# Patient Record
Sex: MALE | Race: WHITE | Employment: FULL TIME | URBAN - METROPOLITAN AREA
[De-identification: names, ages, dates, MRNs, and addresses within clinical notes are randomized per-mention and may not be internally consistent; named-entity substitution may affect disease eponyms.]

---

## 2018-09-15 ENCOUNTER — APPOINTMENT (OUTPATIENT)
Dept: CT IMAGING | Age: 38
End: 2018-09-15
Payer: MEDICAID

## 2018-09-15 ENCOUNTER — HOSPITAL ENCOUNTER (EMERGENCY)
Age: 38
Discharge: ANOTHER HOSPITAL | End: 2018-09-16
Attending: EMERGENCY MEDICINE
Payer: MEDICAID

## 2018-09-15 VITALS
OXYGEN SATURATION: 97 % | WEIGHT: 190 LBS | TEMPERATURE: 98.3 F | DIASTOLIC BLOOD PRESSURE: 90 MMHG | BODY MASS INDEX: 25.73 KG/M2 | HEART RATE: 80 BPM | RESPIRATION RATE: 16 BRPM | SYSTOLIC BLOOD PRESSURE: 134 MMHG | HEIGHT: 72 IN

## 2018-09-15 DIAGNOSIS — I63.9 CEREBELLAR INFARCT (HCC): Primary | ICD-10-CM

## 2018-09-15 LAB
ABSOLUTE EOS #: 0 K/UL (ref 0–0.4)
ABSOLUTE IMMATURE GRANULOCYTE: ABNORMAL K/UL (ref 0–0.3)
ABSOLUTE LYMPH #: 2.8 K/UL (ref 1–4.8)
ABSOLUTE MONO #: 0.9 K/UL (ref 0.2–0.8)
ANION GAP SERPL CALCULATED.3IONS-SCNC: 14 MMOL/L (ref 9–17)
BASOPHILS # BLD: 1 % (ref 0–2)
BASOPHILS ABSOLUTE: 0.1 K/UL (ref 0–0.2)
BUN BLDV-MCNC: 14 MG/DL (ref 6–20)
BUN/CREAT BLD: 15 (ref 9–20)
CALCIUM SERPL-MCNC: 10.1 MG/DL (ref 8.6–10.4)
CHLORIDE BLD-SCNC: 98 MMOL/L (ref 98–107)
CO2: 28 MMOL/L (ref 20–31)
CREAT SERPL-MCNC: 0.91 MG/DL (ref 0.7–1.2)
DIFFERENTIAL TYPE: ABNORMAL
EKG ATRIAL RATE: 59 BPM
EKG P AXIS: 31 DEGREES
EKG P-R INTERVAL: 118 MS
EKG Q-T INTERVAL: 458 MS
EKG QRS DURATION: 98 MS
EKG QTC CALCULATION (BAZETT): 453 MS
EKG R AXIS: 40 DEGREES
EKG T AXIS: 47 DEGREES
EKG VENTRICULAR RATE: 59 BPM
EOSINOPHILS RELATIVE PERCENT: 0 % (ref 1–4)
GFR AFRICAN AMERICAN: >60 ML/MIN
GFR NON-AFRICAN AMERICAN: >60 ML/MIN
GFR SERPL CREATININE-BSD FRML MDRD: ABNORMAL ML/MIN/{1.73_M2}
GFR SERPL CREATININE-BSD FRML MDRD: ABNORMAL ML/MIN/{1.73_M2}
GLUCOSE BLD-MCNC: 104 MG/DL (ref 70–99)
HCT VFR BLD CALC: 53 % (ref 41–53)
HEMOGLOBIN: 18 G/DL (ref 13.5–17.5)
IMMATURE GRANULOCYTES: ABNORMAL %
INR BLD: 1
LYMPHOCYTES # BLD: 23 % (ref 24–44)
MCH RBC QN AUTO: 30.8 PG (ref 26–34)
MCHC RBC AUTO-ENTMCNC: 34 G/DL (ref 31–37)
MCV RBC AUTO: 90.7 FL (ref 80–100)
MONOCYTES # BLD: 7 % (ref 1–7)
NRBC AUTOMATED: ABNORMAL PER 100 WBC
PARTIAL THROMBOPLASTIN TIME: 29.2 SEC (ref 23–31)
PDW BLD-RTO: 13.2 % (ref 11.5–14.5)
PLATELET # BLD: 250 K/UL (ref 130–400)
PLATELET ESTIMATE: ABNORMAL
PMV BLD AUTO: 9.2 FL (ref 6–12)
POTASSIUM SERPL-SCNC: 3.7 MMOL/L (ref 3.7–5.3)
PROTHROMBIN TIME: 10.4 SEC (ref 9.7–11.6)
RBC # BLD: 5.85 M/UL (ref 4.5–5.9)
RBC # BLD: ABNORMAL 10*6/UL
SEG NEUTROPHILS: 69 % (ref 36–66)
SEGMENTED NEUTROPHILS ABSOLUTE COUNT: 8.4 K/UL (ref 1.8–7.7)
SODIUM BLD-SCNC: 140 MMOL/L (ref 135–144)
TROPONIN INTERP: NORMAL
TROPONIN T: <0.03 NG/ML
WBC # BLD: 12.2 K/UL (ref 3.5–11)
WBC # BLD: ABNORMAL 10*3/UL

## 2018-09-15 PROCEDURE — 85730 THROMBOPLASTIN TIME PARTIAL: CPT

## 2018-09-15 PROCEDURE — 80048 BASIC METABOLIC PNL TOTAL CA: CPT

## 2018-09-15 PROCEDURE — 6360000002 HC RX W HCPCS: Performed by: EMERGENCY MEDICINE

## 2018-09-15 PROCEDURE — 2580000003 HC RX 258: Performed by: EMERGENCY MEDICINE

## 2018-09-15 PROCEDURE — 93005 ELECTROCARDIOGRAM TRACING: CPT

## 2018-09-15 PROCEDURE — 85610 PROTHROMBIN TIME: CPT

## 2018-09-15 PROCEDURE — 96374 THER/PROPH/DIAG INJ IV PUSH: CPT

## 2018-09-15 PROCEDURE — 84484 ASSAY OF TROPONIN QUANT: CPT

## 2018-09-15 PROCEDURE — 85025 COMPLETE CBC W/AUTO DIFF WBC: CPT

## 2018-09-15 PROCEDURE — 99285 EMERGENCY DEPT VISIT HI MDM: CPT

## 2018-09-15 PROCEDURE — 70450 CT HEAD/BRAIN W/O DYE: CPT

## 2018-09-15 RX ORDER — 0.9 % SODIUM CHLORIDE 0.9 %
1000 INTRAVENOUS SOLUTION INTRAVENOUS ONCE
Status: COMPLETED | OUTPATIENT
Start: 2018-09-15 | End: 2018-09-15

## 2018-09-15 RX ORDER — PROMETHAZINE HYDROCHLORIDE 25 MG/ML
12.5 INJECTION, SOLUTION INTRAMUSCULAR; INTRAVENOUS ONCE
Status: COMPLETED | OUTPATIENT
Start: 2018-09-15 | End: 2018-09-15

## 2018-09-15 RX ADMIN — SODIUM CHLORIDE 1000 ML: 9 INJECTION, SOLUTION INTRAVENOUS at 22:33

## 2018-09-15 RX ADMIN — PROMETHAZINE HYDROCHLORIDE 12.5 MG: 25 INJECTION INTRAMUSCULAR; INTRAVENOUS at 22:32

## 2018-09-15 ASSESSMENT — PAIN DESCRIPTION - ONSET: ONSET: ON-GOING

## 2018-09-15 ASSESSMENT — PAIN DESCRIPTION - ORIENTATION: ORIENTATION: LEFT

## 2018-09-15 ASSESSMENT — PAIN DESCRIPTION - PAIN TYPE: TYPE: ACUTE PAIN

## 2018-09-15 ASSESSMENT — PAIN DESCRIPTION - PROGRESSION: CLINICAL_PROGRESSION: NOT CHANGED

## 2018-09-15 ASSESSMENT — PAIN DESCRIPTION - FREQUENCY: FREQUENCY: CONTINUOUS

## 2018-09-15 ASSESSMENT — PAIN DESCRIPTION - DESCRIPTORS: DESCRIPTORS: HEADACHE

## 2018-09-15 ASSESSMENT — PAIN SCALES - GENERAL: PAINLEVEL_OUTOF10: 10

## 2018-09-15 ASSESSMENT — PAIN DESCRIPTION - LOCATION: LOCATION: HEAD

## 2018-09-16 ENCOUNTER — APPOINTMENT (OUTPATIENT)
Dept: GENERAL RADIOLOGY | Age: 38
DRG: 045 | End: 2018-09-16
Payer: MEDICAID

## 2018-09-16 ENCOUNTER — HOSPITAL ENCOUNTER (INPATIENT)
Age: 38
LOS: 2 days | Discharge: HOME | DRG: 045 | End: 2018-09-18
Attending: EMERGENCY MEDICINE | Admitting: PSYCHIATRY & NEUROLOGY
Payer: MEDICAID

## 2018-09-16 ENCOUNTER — APPOINTMENT (OUTPATIENT)
Dept: MRI IMAGING | Age: 38
DRG: 045 | End: 2018-09-16
Payer: MEDICAID

## 2018-09-16 ENCOUNTER — APPOINTMENT (OUTPATIENT)
Dept: CT IMAGING | Age: 38
DRG: 045 | End: 2018-09-16
Payer: MEDICAID

## 2018-09-16 DIAGNOSIS — I63.9 CEREBELLAR INFARCT (HCC): Primary | ICD-10-CM

## 2018-09-16 LAB
ABSOLUTE EOS #: 0.03 K/UL (ref 0–0.44)
ABSOLUTE IMMATURE GRANULOCYTE: 0.03 K/UL (ref 0–0.3)
ABSOLUTE LYMPH #: 2.52 K/UL (ref 1.1–3.7)
ABSOLUTE MONO #: 0.85 K/UL (ref 0.1–1.2)
ALBUMIN SERPL-MCNC: 3.9 G/DL (ref 3.5–5.2)
ALBUMIN/GLOBULIN RATIO: 1.3 (ref 1–2.5)
ALP BLD-CCNC: 58 U/L (ref 40–129)
ALT SERPL-CCNC: 99 U/L (ref 5–41)
ANION GAP SERPL CALCULATED.3IONS-SCNC: 12 MMOL/L (ref 9–17)
AST SERPL-CCNC: 67 U/L
BASOPHILS # BLD: 0 % (ref 0–2)
BASOPHILS ABSOLUTE: 0.04 K/UL (ref 0–0.2)
BILIRUB SERPL-MCNC: 1.24 MG/DL (ref 0.3–1.2)
BUN BLDV-MCNC: 12 MG/DL (ref 6–20)
BUN/CREAT BLD: ABNORMAL (ref 9–20)
CALCIUM SERPL-MCNC: 8.9 MG/DL (ref 8.6–10.4)
CHLORIDE BLD-SCNC: 102 MMOL/L (ref 98–107)
CHOLESTEROL/HDL RATIO: 3.7
CHOLESTEROL: 146 MG/DL
CO2: 26 MMOL/L (ref 20–31)
CREAT SERPL-MCNC: 0.71 MG/DL (ref 0.7–1.2)
DIFFERENTIAL TYPE: ABNORMAL
EOSINOPHILS RELATIVE PERCENT: 0 % (ref 1–4)
ESTIMATED AVERAGE GLUCOSE: 91 MG/DL
GFR AFRICAN AMERICAN: >60 ML/MIN
GFR NON-AFRICAN AMERICAN: >60 ML/MIN
GFR SERPL CREATININE-BSD FRML MDRD: ABNORMAL ML/MIN/{1.73_M2}
GFR SERPL CREATININE-BSD FRML MDRD: ABNORMAL ML/MIN/{1.73_M2}
GLUCOSE BLD-MCNC: 109 MG/DL (ref 70–99)
HBA1C MFR BLD: 4.8 % (ref 4–6)
HCT VFR BLD CALC: 48.2 % (ref 40.7–50.3)
HDLC SERPL-MCNC: 39 MG/DL
HEMOGLOBIN: 15.8 G/DL (ref 13–17)
HEPATITIS B SURFACE ANTIGEN: NONREACTIVE
HEPATITIS C ANTIBODY: REACTIVE
HOMOCYSTEINE: 10.7 UMOL/L
IMMATURE GRANULOCYTES: 0 %
LDL CHOLESTEROL: 90 MG/DL (ref 0–130)
LYMPHOCYTES # BLD: 27 % (ref 24–43)
MCH RBC QN AUTO: 30.3 PG (ref 25.2–33.5)
MCHC RBC AUTO-ENTMCNC: 32.8 G/DL (ref 28.4–34.8)
MCV RBC AUTO: 92.3 FL (ref 82.6–102.9)
MONOCYTES # BLD: 9 % (ref 3–12)
NRBC AUTOMATED: 0 PER 100 WBC
PDW BLD-RTO: 12.3 % (ref 11.8–14.4)
PLATELET # BLD: 210 K/UL (ref 138–453)
PLATELET ESTIMATE: ABNORMAL
PMV BLD AUTO: 11.3 FL (ref 8.1–13.5)
POTASSIUM SERPL-SCNC: 3.7 MMOL/L (ref 3.7–5.3)
RBC # BLD: 5.22 M/UL (ref 4.21–5.77)
RBC # BLD: ABNORMAL 10*6/UL
SEDIMENTATION RATE, ERYTHROCYTE: 4 MM (ref 0–10)
SEG NEUTROPHILS: 64 % (ref 36–65)
SEGMENTED NEUTROPHILS ABSOLUTE COUNT: 5.95 K/UL (ref 1.5–8.1)
SODIUM BLD-SCNC: 140 MMOL/L (ref 135–144)
TOTAL PROTEIN: 6.9 G/DL (ref 6.4–8.3)
TRIGL SERPL-MCNC: 85 MG/DL
TSH SERPL DL<=0.05 MIU/L-ACNC: 0.39 MIU/L (ref 0.3–5)
VLDLC SERPL CALC-MCNC: ABNORMAL MG/DL (ref 1–30)
WBC # BLD: 9.4 K/UL (ref 3.5–11.3)
WBC # BLD: ABNORMAL 10*3/UL

## 2018-09-16 PROCEDURE — 70498 CT ANGIOGRAPHY NECK: CPT

## 2018-09-16 PROCEDURE — 6370000000 HC RX 637 (ALT 250 FOR IP): Performed by: INTERNAL MEDICINE

## 2018-09-16 PROCEDURE — 70496 CT ANGIOGRAPHY HEAD: CPT

## 2018-09-16 PROCEDURE — 6370000000 HC RX 637 (ALT 250 FOR IP): Performed by: STUDENT IN AN ORGANIZED HEALTH CARE EDUCATION/TRAINING PROGRAM

## 2018-09-16 PROCEDURE — 6360000002 HC RX W HCPCS: Performed by: EMERGENCY MEDICINE

## 2018-09-16 PROCEDURE — 2580000003 HC RX 258: Performed by: INTERNAL MEDICINE

## 2018-09-16 PROCEDURE — 80053 COMPREHEN METABOLIC PANEL: CPT

## 2018-09-16 PROCEDURE — 94762 N-INVAS EAR/PLS OXIMTRY CONT: CPT

## 2018-09-16 PROCEDURE — 84443 ASSAY THYROID STIM HORMONE: CPT

## 2018-09-16 PROCEDURE — 99254 IP/OBS CNSLTJ NEW/EST MOD 60: CPT | Performed by: PSYCHIATRY & NEUROLOGY

## 2018-09-16 PROCEDURE — 80061 LIPID PANEL: CPT

## 2018-09-16 PROCEDURE — 87340 HEPATITIS B SURFACE AG IA: CPT

## 2018-09-16 PROCEDURE — 86803 HEPATITIS C AB TEST: CPT

## 2018-09-16 PROCEDURE — 99222 1ST HOSP IP/OBS MODERATE 55: CPT | Performed by: PSYCHIATRY & NEUROLOGY

## 2018-09-16 PROCEDURE — 85025 COMPLETE CBC W/AUTO DIFF WBC: CPT

## 2018-09-16 PROCEDURE — 99285 EMERGENCY DEPT VISIT HI MDM: CPT

## 2018-09-16 PROCEDURE — 83036 HEMOGLOBIN GLYCOSYLATED A1C: CPT

## 2018-09-16 PROCEDURE — 6360000002 HC RX W HCPCS: Performed by: INTERNAL MEDICINE

## 2018-09-16 PROCEDURE — 6370000000 HC RX 637 (ALT 250 FOR IP): Performed by: EMERGENCY MEDICINE

## 2018-09-16 PROCEDURE — 86147 CARDIOLIPIN ANTIBODY EA IG: CPT

## 2018-09-16 PROCEDURE — 6360000004 HC RX CONTRAST MEDICATION: Performed by: EMERGENCY MEDICINE

## 2018-09-16 PROCEDURE — 85651 RBC SED RATE NONAUTOMATED: CPT

## 2018-09-16 PROCEDURE — 6360000002 HC RX W HCPCS: Performed by: STUDENT IN AN ORGANIZED HEALTH CARE EDUCATION/TRAINING PROGRAM

## 2018-09-16 PROCEDURE — 2060000000 HC ICU INTERMEDIATE R&B

## 2018-09-16 PROCEDURE — 70551 MRI BRAIN STEM W/O DYE: CPT

## 2018-09-16 PROCEDURE — 36415 COLL VENOUS BLD VENIPUNCTURE: CPT

## 2018-09-16 PROCEDURE — 96374 THER/PROPH/DIAG INJ IV PUSH: CPT

## 2018-09-16 PROCEDURE — 83090 ASSAY OF HOMOCYSTEINE: CPT

## 2018-09-16 PROCEDURE — 71045 X-RAY EXAM CHEST 1 VIEW: CPT

## 2018-09-16 RX ORDER — ATORVASTATIN CALCIUM 80 MG/1
40 TABLET, FILM COATED ORAL ONCE
Status: COMPLETED | OUTPATIENT
Start: 2018-09-16 | End: 2018-09-16

## 2018-09-16 RX ORDER — FAMOTIDINE 20 MG/1
20 TABLET, FILM COATED ORAL 2 TIMES DAILY
Status: DISCONTINUED | OUTPATIENT
Start: 2018-09-16 | End: 2018-09-18 | Stop reason: HOSPADM

## 2018-09-16 RX ORDER — CHLORHEXIDINE GLUCONATE 0.12 MG/ML
15 RINSE ORAL 2 TIMES DAILY
Status: DISCONTINUED | OUTPATIENT
Start: 2018-09-16 | End: 2018-09-18 | Stop reason: HOSPADM

## 2018-09-16 RX ORDER — SODIUM CHLORIDE 0.9 % (FLUSH) 0.9 %
10 SYRINGE (ML) INJECTION EVERY 12 HOURS SCHEDULED
Status: DISCONTINUED | OUTPATIENT
Start: 2018-09-16 | End: 2018-09-18 | Stop reason: HOSPADM

## 2018-09-16 RX ORDER — ATORVASTATIN CALCIUM 40 MG/1
40 TABLET, FILM COATED ORAL NIGHTLY
Status: DISCONTINUED | OUTPATIENT
Start: 2018-09-16 | End: 2018-09-18 | Stop reason: HOSPADM

## 2018-09-16 RX ORDER — ASPIRIN 81 MG/1
81 TABLET, CHEWABLE ORAL DAILY
Status: DISCONTINUED | OUTPATIENT
Start: 2018-09-16 | End: 2018-09-16

## 2018-09-16 RX ORDER — KETOROLAC TROMETHAMINE 30 MG/ML
30 INJECTION, SOLUTION INTRAMUSCULAR; INTRAVENOUS EVERY 6 HOURS PRN
Status: DISCONTINUED | OUTPATIENT
Start: 2018-09-16 | End: 2018-09-18 | Stop reason: HOSPADM

## 2018-09-16 RX ORDER — KETOROLAC TROMETHAMINE 30 MG/ML
30 INJECTION, SOLUTION INTRAMUSCULAR; INTRAVENOUS ONCE
Status: COMPLETED | OUTPATIENT
Start: 2018-09-16 | End: 2018-09-16

## 2018-09-16 RX ORDER — SODIUM CHLORIDE 9 MG/ML
INJECTION, SOLUTION INTRAVENOUS CONTINUOUS
Status: ACTIVE | OUTPATIENT
Start: 2018-09-16 | End: 2018-09-16

## 2018-09-16 RX ORDER — ONDANSETRON 2 MG/ML
4 INJECTION INTRAMUSCULAR; INTRAVENOUS EVERY 6 HOURS PRN
Status: DISCONTINUED | OUTPATIENT
Start: 2018-09-16 | End: 2018-09-18 | Stop reason: HOSPADM

## 2018-09-16 RX ORDER — METOCLOPRAMIDE HYDROCHLORIDE 5 MG/ML
10 INJECTION INTRAMUSCULAR; INTRAVENOUS ONCE
Status: COMPLETED | OUTPATIENT
Start: 2018-09-16 | End: 2018-09-16

## 2018-09-16 RX ORDER — MECLIZINE HCL 12.5 MG/1
25 TABLET ORAL ONCE
Status: COMPLETED | OUTPATIENT
Start: 2018-09-16 | End: 2018-09-16

## 2018-09-16 RX ORDER — 0.9 % SODIUM CHLORIDE 0.9 %
500 INTRAVENOUS SOLUTION INTRAVENOUS ONCE
Status: COMPLETED | OUTPATIENT
Start: 2018-09-16 | End: 2018-09-16

## 2018-09-16 RX ORDER — ASPIRIN 81 MG/1
81 TABLET, CHEWABLE ORAL ONCE
Status: COMPLETED | OUTPATIENT
Start: 2018-09-16 | End: 2018-09-16

## 2018-09-16 RX ORDER — MAGNESIUM HYDROXIDE/ALUMINUM HYDROXICE/SIMETHICONE 120; 1200; 1200 MG/30ML; MG/30ML; MG/30ML
30 SUSPENSION ORAL ONCE
Status: COMPLETED | OUTPATIENT
Start: 2018-09-16 | End: 2018-09-16

## 2018-09-16 RX ORDER — ACETAMINOPHEN 325 MG/1
650 TABLET ORAL EVERY 4 HOURS PRN
Status: DISCONTINUED | OUTPATIENT
Start: 2018-09-16 | End: 2018-09-18 | Stop reason: HOSPADM

## 2018-09-16 RX ORDER — CHLORHEXIDINE GLUCONATE 0.12 MG/ML
15 RINSE ORAL 2 TIMES DAILY
Status: CANCELLED | OUTPATIENT
Start: 2018-09-16

## 2018-09-16 RX ORDER — FOLIC ACID 1 MG/1
1 TABLET ORAL DAILY
Status: DISCONTINUED | OUTPATIENT
Start: 2018-09-16 | End: 2018-09-18 | Stop reason: HOSPADM

## 2018-09-16 RX ORDER — SODIUM CHLORIDE 0.9 % (FLUSH) 0.9 %
10 SYRINGE (ML) INJECTION PRN
Status: DISCONTINUED | OUTPATIENT
Start: 2018-09-16 | End: 2018-09-18 | Stop reason: HOSPADM

## 2018-09-16 RX ORDER — ASPIRIN 81 MG/1
81 TABLET ORAL DAILY
Status: DISCONTINUED | OUTPATIENT
Start: 2018-09-16 | End: 2018-09-18 | Stop reason: HOSPADM

## 2018-09-16 RX ADMIN — ONDANSETRON 4 MG: 2 INJECTION INTRAMUSCULAR; INTRAVENOUS at 11:51

## 2018-09-16 RX ADMIN — FOLIC ACID 1 MG: 1 TABLET ORAL at 04:32

## 2018-09-16 RX ADMIN — IOPAMIDOL 90 ML: 755 INJECTION, SOLUTION INTRAVENOUS at 02:31

## 2018-09-16 RX ADMIN — KETOROLAC TROMETHAMINE 30 MG: 30 INJECTION, SOLUTION INTRAMUSCULAR at 12:31

## 2018-09-16 RX ADMIN — Medication 10 ML: at 08:29

## 2018-09-16 RX ADMIN — DESMOPRESSIN ACETATE 40 MG: 0.2 TABLET ORAL at 21:36

## 2018-09-16 RX ADMIN — ALUMINUM HYDROXIDE, MAGNESIUM HYDROXIDE, AND SIMETHICONE 30 ML: 200; 200; 20 SUSPENSION ORAL at 17:37

## 2018-09-16 RX ADMIN — SODIUM CHLORIDE 500 ML: 9 INJECTION, SOLUTION INTRAVENOUS at 01:58

## 2018-09-16 RX ADMIN — CHLORHEXIDINE GLUCONATE 0.12% ORAL RINSE 15 ML: 1.2 LIQUID ORAL at 21:36

## 2018-09-16 RX ADMIN — METOCLOPRAMIDE 10 MG: 5 INJECTION, SOLUTION INTRAMUSCULAR; INTRAVENOUS at 01:11

## 2018-09-16 RX ADMIN — KETOROLAC TROMETHAMINE 30 MG: 30 INJECTION, SOLUTION INTRAMUSCULAR at 18:21

## 2018-09-16 RX ADMIN — ASPIRIN 81 MG: 81 TABLET ORAL at 08:29

## 2018-09-16 RX ADMIN — ASPIRIN 81 MG 81 MG: 81 TABLET ORAL at 02:17

## 2018-09-16 RX ADMIN — ATORVASTATIN CALCIUM 40 MG: 80 TABLET, FILM COATED ORAL at 04:32

## 2018-09-16 RX ADMIN — KETOROLAC TROMETHAMINE 30 MG: 30 INJECTION, SOLUTION INTRAMUSCULAR at 04:32

## 2018-09-16 RX ADMIN — Medication 10 ML: at 21:37

## 2018-09-16 RX ADMIN — Medication 10 ML: at 04:33

## 2018-09-16 RX ADMIN — SODIUM CHLORIDE: 9 INJECTION, SOLUTION INTRAVENOUS at 04:37

## 2018-09-16 RX ADMIN — ACETAMINOPHEN 650 MG: 325 TABLET ORAL at 21:37

## 2018-09-16 RX ADMIN — MECLIZINE HYDROCHLORIDE 25 MG: 12.5 TABLET, FILM COATED ORAL at 01:58

## 2018-09-16 RX ADMIN — FAMOTIDINE 20 MG: 20 TABLET, FILM COATED ORAL at 08:29

## 2018-09-16 RX ADMIN — FAMOTIDINE 20 MG: 20 TABLET, FILM COATED ORAL at 21:36

## 2018-09-16 RX ADMIN — CHLORHEXIDINE GLUCONATE 0.12% ORAL RINSE 15 ML: 1.2 LIQUID ORAL at 08:28

## 2018-09-16 ASSESSMENT — PAIN DESCRIPTION - LOCATION
LOCATION: HEAD

## 2018-09-16 ASSESSMENT — ENCOUNTER SYMPTOMS
VOMITING: 1
VOMITING: 0
NAUSEA: 1
SORE THROAT: 0
RHINORRHEA: 0
EYE PAIN: 0
BLOOD IN STOOL: 0
EYE DISCHARGE: 0
ABDOMINAL PAIN: 0
WHEEZING: 0
CONSTIPATION: 0
NAUSEA: 0
BACK PAIN: 0
COLOR CHANGE: 0
SHORTNESS OF BREATH: 0
ABDOMINAL PAIN: 0
RHINORRHEA: 0
DIARRHEA: 0
CHEST TIGHTNESS: 0
COUGH: 0
SHORTNESS OF BREATH: 0

## 2018-09-16 ASSESSMENT — PAIN SCALES - GENERAL
PAINLEVEL_OUTOF10: 4
PAINLEVEL_OUTOF10: 5
PAINLEVEL_OUTOF10: 7
PAINLEVEL_OUTOF10: 10
PAINLEVEL_OUTOF10: 7
PAINLEVEL_OUTOF10: 10

## 2018-09-16 ASSESSMENT — PAIN DESCRIPTION - PAIN TYPE: TYPE: ACUTE PAIN

## 2018-09-16 NOTE — FLOWSHEET NOTE
[address] [facility] [address]     Emergency/Trauma Note    PATIENT NAME: Santosh Clemons    Shift date: 9/16/2018  Shift day: Sunday   Shift # 3    Room # 34/34   Name: Santosh Clemons            Age: 34 y.o. Gender: male          Pentecostal: Non-Amish   Place of Adventist: None    Trauma/Incident type: Stroke Consult  Admit Date & Time: 9/16/2018 12:53 AM  TRAUMA NAME:         PATIENT/EVENT DESCRIPTION:  Santosh Clemons is a 34 y.o. male who arrived via EMS as a transfer from [facility]  as a stroke consult. Patient was awake and alert and was able to communicate. Patient stated that he was dizzy and it started today. Pt to be admitted to /34. SPIRITUAL ASSESSMENT/INTERVENTION:   responded to page. Patient's mom Kati Abdul ([phone]) was present. Patient appeared drowsy and mom stated that he was just given medication. Mom stated that patient was \"throwing up all day\".  nurtured hope, affirmed feelings and provided presence and support. PATIENT BELONGINGS:  No belongings noted    ANY BELONGINGS OF SIGNIFICANT VALUE NOTED:  None    REGISTRATION STAFF NOTIFIED? Yes      WHAT IS YOUR SPIRITUAL CARE PLAN FOR THIS PATIENT?:   Osmany Carranza will remain available for spiritual and emotional support as needed. Electronically signed by Tia Schaffer on 9/16/2018 at [address] [place] [address]  [phone]     09/16/18 7045   Encounter Summary   Services provided to: Patient and family together   Referral/Consult From: Multi-disciplinary team   Support System Parent   Place of Nondenominational None   Continue Visiting (9/16/2018)   Complexity of Encounter High   Length of Encounter 30 minutes   Spiritual Assessment Completed Yes   Crisis   Type (Stroke Consult)   Assessment Calm; Approachable;Coping   Intervention Active listening;Explored feelings, thoughts, concerns;Explored coping resources;Nurtured hope;Sustaining presence/ Ministry of

## 2018-09-16 NOTE — ED PROVIDER NOTES
dizziness and headaches. Negative for syncope, weakness and numbness. Hematological: Does not bruise/bleed easily. Psychiatric/Behavioral: Negative for behavioral problems, self-injury and suicidal ideas. Except as noted above the remainder of the review of systems was reviewed and negative. PAST MEDICAL HISTORY     Past Medical History:   Diagnosis Date    Anxiety     Blind right eye     GERD (gastroesophageal reflux disease)     Lung collapse     Pneumonia     Psychiatric problem          SURGICAL HISTORY       Past Surgical History:   Procedure Laterality Date    COLONOSCOPY      ENDOSCOPY, COLON, DIAGNOSTIC      EYE SURGERY      LOBECTOMY Left     lower lobe         CURRENT MEDICATIONS       Previous Medications    No medications on file       ALLERGIES     Patient has no known allergies. FAMILY HISTORY     History reviewed. No pertinent family history. SOCIAL HISTORY       Social History     Social History    Marital status: Single     Spouse name: N/A    Number of children: N/A    Years of education: N/A     Social History Main Topics    Smoking status: Current Every Day Smoker     Types: Cigarettes    Smokeless tobacco: None    Alcohol use Yes      Comment: socially    Drug use: Yes     Types: Cocaine    Sexual activity: Not Asked     Other Topics Concern    None     Social History Narrative    None       SCREENINGS             PHYSICAL EXAM    (up to 7 for level 4, 8 or more for level 5)     ED Triage Vitals [09/15/18 2145]   BP Temp Temp Source Pulse Resp SpO2 Height Weight   (!) 134/90 98.3 °F (36.8 °C) Oral 80 16 97 % 6' (1.829 m) 190 lb (86.2 kg)       Physical Exam   Constitutional: He is oriented to person, place, and time. He appears well-developed. No distress. HENT:   Head: Normocephalic and atraumatic. Eyes: EOM are normal. Right eye exhibits no discharge. Left eye exhibits no discharge. Neck: Normal range of motion. No tracheal deviation present. Cardiovascular: Normal rate and regular rhythm. Exam reveals no friction rub. No murmur heard. Pulmonary/Chest: Effort normal. No stridor. No respiratory distress. He has no wheezes. He has no rales. He exhibits no tenderness. Abdominal: Soft. He exhibits no distension and no mass. There is no tenderness. There is no rebound and no guarding. Musculoskeletal: Normal range of motion. He exhibits no edema, tenderness or deformity. Neurological: He is alert and oriented to person, place, and time. NIH stroke scale of 0   Skin: Skin is warm. No rash noted. He is not diaphoretic. No erythema. Psychiatric: He has a normal mood and affect. His behavior is normal.       DIAGNOSTIC RESULTS     EKG: All EKG's are interpreted by the Emergency Department Physician who either signs or Co-signs this chart in the absence of a cardiologist.        RADIOLOGY:   Non-plain film images such as CT, Ultrasound and MRI are read by the radiologist. Plain radiographic images are visualized and preliminarily interpreted by the emergency physician with the below findings:        Interpretation per the Radiologist below, if available at the time of this note:    CT Head WO Contrast   Preliminary Result   Left cerebellar hemisphere acute to subacute infarction, within the left   posterior inferior cerebellar artery territory. Consider further   characterization by MRI. Critical results were called by Dr. Rody Haney to Pedro Isaacs on   9/15/2018 at 23:32.                ED BEDSIDE ULTRASOUND:   Performed by ED Physician - none    LABS:  Labs Reviewed   CBC WITH AUTO DIFFERENTIAL - Abnormal; Notable for the following:        Result Value    WBC 12.2 (*)     Hemoglobin 18.0 (*)     Seg Neutrophils 69 (*)     Lymphocytes 23 (*)     Eosinophils % 0 (*)     Segs Absolute 8.40 (*)     Absolute Mono # 0.90 (*)     All other components within normal limits   BASIC METABOLIC PANEL - Abnormal; Notable for the following: Glucose 104 (*)     All other components within normal limits   APTT   PROTIME-INR   TROPONIN   TROPONIN   TROPONIN   TROPONIN       All other labs were within normal range or not returned as of this dictation. EMERGENCY DEPARTMENT COURSE and DIFFERENTIAL DIAGNOSIS/MDM:   Vitals:    Vitals:    09/15/18 2145   BP: (!) 134/90   Pulse: 80   Resp: 16   Temp: 98.3 °F (36.8 °C)   TempSrc: Oral   SpO2: 97%   Weight: 190 lb (86.2 kg)   Height: 6' (1.829 m)           MDM  Number of Diagnoses or Management Options  Diagnosis management comments: 78-year-old male presented with complaint of headache and dizziness. Patient denied any pertinent past medical history. Patient's last known well was proximal me 10 AM this morning. Patient's presentation was concerning for potential posterior stroke. Patient's NIH was 0. However, imaging did demonstrate cerebellar infarct. Interventional neurology at [facility] spoken to and agreed to accept the patient. In addition, your physician Dr. Stefania Keane spoken SABs and accepted patient for transfer. At the time of transfer patient was hemodynamically stable him a GCS of 15. Wrist and benefits were discussed with the patient and family of transfer and they were agreeable. CRITICAL CARE TIME   Total Critical Care time was  minutes, excluding separately reportable procedures. There was a high probability of clinically significant/life threatening deterioration in the patient's condition which required my urgent intervention. CONSULTS:  None    PROCEDURES:  Unless otherwise noted below, none     Procedures    FINAL IMPRESSION      1. Cerebellar infarct ([facility] [address]          DISPOSITION/PLAN   DISPOSITION        PATIENT REFERRED TO:  No follow-up provider specified.     DISCHARGE MEDICATIONS:  New Prescriptions    No medications on file          Problem List:  Patient Active Problem List   Diagnosis Code    Pneumothorax J93.9           Summation      Patient Course:    ED Medications administered this visit:    Medications   0.9 % sodium chloride bolus (0 mLs Intravenous Stopped 9/15/18 5123)   promethazine (PHENERGAN) injection 12.5 mg (12.5 mg Intravenous Given 9/15/18 2232)       New Prescriptions from this visit:    New Prescriptions    No medications on file       Follow-up:  No follow-up provider specified. Final Impression:   1.  Cerebellar infarct [facility])               (Please note that portions of this note were completed with a voice recognition program.  Efforts were made to edit the dictations but occasionally words are mis-transcribed.)    Radha Salazar MD (electronically signed)  Attending Emergency Physician           Radha Salazar MD  09/16/18 0005

## 2018-09-16 NOTE — PROGRESS NOTES
Patient admitted to [facility] ambulatory from wheelchair. Complaint of 10/10 headache, visual and auditory sensitivity, right sided extremity and facial numbness with tingling. No motor deficits. Bedside swallow screen passed, see charting. Neurology resident notified, patient medicated with Toradol. Educated on safety precautions.

## 2018-09-16 NOTE — PLAN OF CARE
Problem: ACTIVITY INTOLERANCE/IMPAIRED MOBILITY  Goal: Mobility/activity is maintained at optimum level for patient  Outcome: Ongoing  Patient has no motor deficits. Numbness tingling to left side, mild dizziness. Able to ambulate steadily with staff standby assistance. Educated on fall precautions. Problem: Pain:  Goal: Control of acute pain  Control of acute pain   Outcome: Ongoing  10/10 headache with photosensitivity and auditory sensitivity. Environment changed for comfort. Resident notiofioed of pain, given one time Toradol. Patient educated on available pain control measures including non-pharmacologic and medications. Pain goal discussed. Whiteboard updated for available time of next administration PRN. Will continue ordered interventions & assess pain.

## 2018-09-16 NOTE — CONSULTS
Patient has no known allergies. Social History:   Current smoker. He is a social drinker. He has used cocaine in the past per the EMR. Family History:   History reviewed. No pertinent family history. REVIEW OF SYSTEMS:  As discussed in the HPI. PHYSICAL EXAM:  VITALS:  /73   Pulse 57   Temp 98.1 °F (36.7 °C) (Oral)   Resp 16   Ht 6' (1.829 m)   Wt 191 lb 8 oz (86.9 kg)   SpO2 95%   BMI 25.97 kg/m²     Easily roused from sleep, NAD  Answers questions appropriately with clear and fluent speech  CNs II-XII intact to bedside exam  No pronator drift  MAEW with 5/5 strength in all major muscle groups  Sensation intact to light touch throughout, but subjectively altered on the left in a non-dermatomal distribution involving the arm and leg. Mild ataxia on the left with finger-to-nose, finger-nose-finger  Gait testing deferred      DATA:  I personally reviewed a CT of the head, MRI of the brain, as well as the radiologists' reports. There is an acute infarct in the left cerebellar hemisphere with some effacement of the fourth ventricle, but no evidence of hydrocephalus or brainstem compression. IMPRESSION/RECOMMENDATIONS:      34year old man with left cerebellar stroke. -Currently no signs of hydrocephalus or brainstem compression. Will need close neurologic monitoring with at least q2hr neuro checks. I do not see any need for surgical intervention at this time.

## 2018-09-16 NOTE — ED PROVIDER NOTES
 Alcohol use Yes      Comment: socially    Drug use: Yes     Types: Cocaine    Sexual activity: Not on file     Other Topics Concern    Not on file     Social History Narrative    No narrative on file       History reviewed. No pertinent family history. Allergies:  Patient has no known allergies. Home Medications:  Prior to Admission medications    Not on File       REVIEW OF SYSTEMS    (2-9 systems for level 4, 10 or more for level 5)      Review of Systems   Constitutional: Negative for chills and fever. HENT: Negative for congestion and rhinorrhea. Eyes: Negative for visual disturbance. Respiratory: Negative for shortness of breath. Cardiovascular: Negative for chest pain. Gastrointestinal: Positive for nausea and vomiting. Negative for abdominal pain. Genitourinary: Negative for dysuria and frequency. Musculoskeletal: Negative for arthralgias. Skin: Negative for wound. Neurological: Positive for dizziness. Negative for light-headedness. PHYSICAL EXAM   (up to 7 for level 4, 8 or more for level 5)      INITIAL VITALS:   /67   Pulse 57   Temp 98.4 °F (36.9 °C)   Resp 14   Ht 6' (1.829 m)   Wt 191 lb 8 oz (86.9 kg)   SpO2 98%   BMI 25.97 kg/m²     Physical Exam   Constitutional: He is oriented to person, place, and time. No distress. HENT:   Head: Normocephalic and atraumatic. Eyes: Right eye exhibits no discharge. Left eye exhibits no discharge. Cardiovascular: Normal rate, regular rhythm and normal heart sounds. Exam reveals no friction rub. No murmur heard. Pulmonary/Chest: Effort normal and breath sounds normal. No stridor. No respiratory distress. He has no wheezes. He has no rales. He exhibits no tenderness. Abdominal: Soft. He exhibits no distension. There is no tenderness. There is no guarding. Neurological: He is alert and oriented to person, place, and time. He has normal strength. No cranial nerve deficit or sensory deficit.  GCS eye mg    atorvastatin (LIPITOR) tablet 40 mg    iopamidol (ISOVUE-370) 76 % injection 90 mL    folic acid (FOLVITE) tablet 1 mg    ketorolac (TORADOL) injection 30 mg    acetaminophen (TYLENOL) tablet 650 mg    0.9 % sodium chloride infusion    influenza quadrivalent-split vaccine (FLUZONE) injection 0.5 mL       DIAGNOSTIC RESULTS / EMERGENCY DEPARTMENT COURSE / MDM     LABS:  No results found for this visit on 09/16/18. EMERGENCY DEPARTMENT COURSE:    MDM: Patient found to have cerebellar infarct seen and evaluated by neurology team.  They would like CTA head neck as well as MRI brain but this can be finished while patient is inpatient. Will receive a CTA and then go to the floor for further management by neurology. Also given aspirin and Plavix. Discussed with patient is agreeable with the admission. PROCEDURES:  None    CONSULTS:  IP CONSULT TO ENDOVASCULAR NEUROSURGERY  IP CONSULT TO NEUROSURGERY    FINAL IMPRESSION      1. Cerebellar infarct ([facility] [address]          DISPOSITION / PLAN     DISPOSITION Admitted 09/16/2018 02:11:06 AM      PATIENT REFERRED TO:  No follow-up provider specified. DISCHARGE MEDICATIONS:  There are no discharge medications for this patient.       Raji Rodarte MD  Emergency Medicine Resident  [facility]    (Please note that portions of this note were completed with a voice recognition program.  Efforts were made to edit the dictations but occasionally words are mis-transcribed.)       Raji Rodarte MD  Resident  09/16/18 5689

## 2018-09-16 NOTE — ED NOTES
MRI Checklist completed, signed, and faxed.      [facility] [place], RN  09/16/18 4983

## 2018-09-16 NOTE — PROGRESS NOTES
[address]  Speech Language Pathology    Date: 9/16/2018  Patient Name: Deborah Zapata  YOB: 1980   AGE: 34 y.o. MRN: [id number]        Patient Not Available for Speech Therapy     Due to:  [] Testing  [] Hemodialysis  [] Cancelled by RN  [] Surgery   [] Intubation/Sedation/Pain Medication  [] Medical instability  [x] Other: Pt refused due to just falling sleep. Requests evaluation to be completed tomorrow. Next scheduled treatment: 9/17  Completed by: Barbara Wade M.A. CCC-SLP

## 2018-09-16 NOTE — ED NOTES
Pt ambulatory to restroom with steady gait.       [address] Anjum Carrillo RN  09/16/18 0000
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
## 2018-09-16 NOTE — H&P
Range    PTT 29.2 23 - 31 sec   Protime-INR    Collection Time: 09/15/18 10:22 PM   Result Value Ref Range    Protime 10.4 9.7 - 11.6 sec    INR 1.0    CBC Auto Differential    Collection Time: 09/15/18 10:22 PM   Result Value Ref Range    WBC 12.2 (H) 3.5 - 11.0 k/uL    RBC 5.85 4.5 - 5.9 m/uL    Hemoglobin 18.0 (H) 13.5 - 17.5 g/dL    Hematocrit 53.0 41 - 53 %    MCV 90.7 80 - 100 fL    MCH 30.8 26 - 34 pg    MCHC 34.0 31 - 37 g/dL    RDW 13.2 11.5 - 14.5 %    Platelets 040 809 - 796 k/uL    MPV 9.2 6.0 - 12.0 fL    NRBC Automated NOT REPORTED per 100 WBC    Differential Type NOT REPORTED     Immature Granulocytes NOT REPORTED 0 %    Absolute Immature Granulocyte NOT REPORTED 0.00 - 0.30 k/uL    WBC Morphology NOT REPORTED     RBC Morphology NOT REPORTED     Platelet Estimate NOT REPORTED     Seg Neutrophils 69 (H) 36 - 66 %    Lymphocytes 23 (L) 24 - 44 %    Monocytes 7 1 - 7 %    Eosinophils % 0 (L) 1 - 4 %    Basophils 1 0 - 2 %    Segs Absolute 8.40 (H) 1.8 - 7.7 k/uL    Absolute Lymph # 2.80 1.0 - 4.8 k/uL    Absolute Mono # 0.90 (H) 0.2 - 0.8 k/uL    Absolute Eos # 0.00 0.0 - 0.4 k/uL    Basophils # 0.10 0.0 - 0.2 k/uL   Basic Metabolic Panel    Collection Time: 09/15/18 10:22 PM   Result Value Ref Range    Glucose 104 (H) 70 - 99 mg/dL    BUN 14 6 - 20 mg/dL    CREATININE 0.91 0.70 - 1.20 mg/dL    Bun/Cre Ratio 15 9 - 20    Calcium 10.1 8.6 - 10.4 mg/dL    Sodium 140 135 - 144 mmol/L    Potassium 3.7 3.7 - 5.3 mmol/L    Chloride 98 98 - 107 mmol/L    CO2 28 20 - 31 mmol/L    Anion Gap 14 9 - 17 mmol/L    GFR Non-African American >60 >60 mL/min    GFR African American >60 >60 mL/min    GFR Comment          GFR Staging NOT REPORTED    Troponin    Collection Time: 09/15/18 10:22 PM   Result Value Ref Range    Troponin T <0.03 <0.03 ng/mL    Troponin Interp         EKG 12 Lead    Collection Time: 09/15/18 10:26 PM   Result Value Ref Range    Ventricular Rate 59 BPM    Atrial Rate 59 BPM    P-R Interval 118 ms QRS Duration 98 ms    Q-T Interval 458 ms    QTc Calculation (Bazett) 453 ms    P Axis 31 degrees    R Axis 40 degrees    T Axis 47 degrees       Imaging/Diagnostics:    1.) Head CT Scan WO Contrast from [facility]  Impression   Left cerebellar hemisphere acute to subacute infarction, within the left   posterior inferior cerebellar artery territory.  Consider further   characterization by MRI.       Critical results were called by Dr. Antonio Rodriguez to Eriberto Gaston on   9/15/2018 at 23:32.         Assessment :      Primary Problem  34 y.o. male who presents with Headache, nausea, vomiting and dizziness since 10:00AM (9/15/18). [facility] to [facility] around 9:00PM (9/15/18). CT Scan revealed acute to subacute infarct left cerebellum. Patient transfer here. Patient out of window for tPA and Mechanical Trombectomy.  Will do CTA H/N to evaluate any dissection    Active Hospital Problems    Diagnosis Date Noted    Cerebellar stroke ([facility] [address] [I63.9] 09/16/2018       Plan:     Patient status Admit as inpatient in the  Progressive Unit/Step down    - CTA H/N- STAT              - MRI Brain WO               - 2D ECHO              - ASA 81mg now then daily               - Folic acid 1mg BID              - Lipitor 40mg nightly               - Fasting Lipid panel              - PT, OT, Speech eval               - Hydrate with 1L              - Telemetry               - Close Neuro checks per protocol  - We recommend -180  - Blood glucose goal less than 180  - Please avoid dextrose containing solutions  - Will admit to General neurology, if becoming too drowsy will send to NICU  - Neurosurgery consult in AM if necessary    Consultations:   IP CONSULT TO ENDOVASCULAR NEUROSURGERY    Patient is admitted as inpatient status because of co-morbidities listed above, severity of signs and symptoms as outlined, requirement for current medical therapies and most importantly because of direct risk to patient if care not provided in a

## 2018-09-16 NOTE — CARE COORDINATION
Case Management Initial Discharge Plan  Matthew Pope,             Met with:patient and mom to discuss discharge plans. Information verified: address, contacts, phone number, , insurance Yes  PCP: No primary care provider on file. Date of last visit: no pcp, has no insurance. Will need [facility] or [facility] for follow up care. Insurance Provider: no insurance    Discharge Planning    Living Arrangements:  Alone   Support Systems:  Family Members    Home has 2 stories  1 stairs to climb to get into front door, lives in basement of ranch house, 1 flight stairs to climb to reach second floor  Location of bedroom/bathroom in home bedroom in basement bathroom [place]    Patient able to perform ADL's:Independent    Current Services (outpatient & in home) none  DME equipment: none  DME provider: none    Pharmacy: [facility] Medications:  No  Does patient want to participate in local refill/ meds to beds program?  No    Potential Assistance Needed:  N/A    Patient agreeable to home care: No  [place] of choice provided:  n/a    Prior SNF/Rehab Placement and Facility: none  Agreeable to SNF/Rehab: No  [place] of choice provided: n/a   Evaluation: n/a    Expected Discharge date:  18  Patient expects to be discharged to:  home  Follow Up Appointment: Best Day/ Time: Wednesday PM    Transportation provider: mom  Transportation arrangements needed for discharge: No    Readmission Risk              Risk of Unplanned Readmission:        6               Does patient have a readmission risk score greater than 14?: No  If yes, follow-up appointment must be made within 7 days of discharge. Discharge Plan: home with mom.            Electronically signed by Echo Castro RN on 18 at 2:22 PM

## 2018-09-16 NOTE — ED NOTES
Bed: 34  Expected date:   Expected time:   Means of arrival:   Comments:  sandra Thurman RN  09/16/18 3163

## 2018-09-17 ENCOUNTER — APPOINTMENT (OUTPATIENT)
Dept: CT IMAGING | Age: 38
DRG: 045 | End: 2018-09-17
Payer: MEDICAID

## 2018-09-17 LAB
LV EF: 50 %
LVEF MODALITY: NORMAL

## 2018-09-17 PROCEDURE — 2580000003 HC RX 258: Performed by: INTERNAL MEDICINE

## 2018-09-17 PROCEDURE — 99233 SBSQ HOSP IP/OBS HIGH 50: CPT | Performed by: PSYCHIATRY & NEUROLOGY

## 2018-09-17 PROCEDURE — 6360000002 HC RX W HCPCS: Performed by: STUDENT IN AN ORGANIZED HEALTH CARE EDUCATION/TRAINING PROGRAM

## 2018-09-17 PROCEDURE — 6360000002 HC RX W HCPCS: Performed by: INTERNAL MEDICINE

## 2018-09-17 PROCEDURE — G9168 MEMORY CURRENT STATUS: HCPCS

## 2018-09-17 PROCEDURE — G8988 SELF CARE GOAL STATUS: HCPCS

## 2018-09-17 PROCEDURE — 70450 CT HEAD/BRAIN W/O DYE: CPT

## 2018-09-17 PROCEDURE — 6370000000 HC RX 637 (ALT 250 FOR IP): Performed by: INTERNAL MEDICINE

## 2018-09-17 PROCEDURE — G8978 MOBILITY CURRENT STATUS: HCPCS

## 2018-09-17 PROCEDURE — 93306 TTE W/DOPPLER COMPLETE: CPT

## 2018-09-17 PROCEDURE — 2060000000 HC ICU INTERMEDIATE R&B

## 2018-09-17 PROCEDURE — 99233 SBSQ HOSP IP/OBS HIGH 50: CPT | Performed by: NEUROLOGICAL SURGERY

## 2018-09-17 PROCEDURE — 6370000000 HC RX 637 (ALT 250 FOR IP): Performed by: STUDENT IN AN ORGANIZED HEALTH CARE EDUCATION/TRAINING PROGRAM

## 2018-09-17 PROCEDURE — 97165 OT EVAL LOW COMPLEX 30 MIN: CPT

## 2018-09-17 PROCEDURE — 92523 SPEECH SOUND LANG COMPREHEN: CPT

## 2018-09-17 PROCEDURE — G8989 SELF CARE D/C STATUS: HCPCS

## 2018-09-17 PROCEDURE — 94762 N-INVAS EAR/PLS OXIMTRY CONT: CPT

## 2018-09-17 PROCEDURE — G8987 SELF CARE CURRENT STATUS: HCPCS

## 2018-09-17 PROCEDURE — G8980 MOBILITY D/C STATUS: HCPCS

## 2018-09-17 PROCEDURE — 97161 PT EVAL LOW COMPLEX 20 MIN: CPT

## 2018-09-17 PROCEDURE — G8979 MOBILITY GOAL STATUS: HCPCS

## 2018-09-17 PROCEDURE — G9169 MEMORY GOAL STATUS: HCPCS

## 2018-09-17 RX ORDER — PANTOPRAZOLE SODIUM 40 MG/1
40 TABLET, DELAYED RELEASE ORAL ONCE
Status: COMPLETED | OUTPATIENT
Start: 2018-09-17 | End: 2018-09-17

## 2018-09-17 RX ADMIN — FOLIC ACID 1 MG: 1 TABLET ORAL at 07:36

## 2018-09-17 RX ADMIN — DESMOPRESSIN ACETATE 40 MG: 0.2 TABLET ORAL at 20:37

## 2018-09-17 RX ADMIN — MAGNESIUM HYDROXIDE 30 ML: 400 SUSPENSION ORAL at 20:38

## 2018-09-17 RX ADMIN — PANTOPRAZOLE SODIUM 40 MG: 40 TABLET, DELAYED RELEASE ORAL at 22:34

## 2018-09-17 RX ADMIN — ASPIRIN 81 MG: 81 TABLET ORAL at 07:36

## 2018-09-17 RX ADMIN — FAMOTIDINE 20 MG: 20 TABLET, FILM COATED ORAL at 20:37

## 2018-09-17 RX ADMIN — KETOROLAC TROMETHAMINE 30 MG: 30 INJECTION, SOLUTION INTRAMUSCULAR at 13:57

## 2018-09-17 RX ADMIN — CHLORHEXIDINE GLUCONATE 0.12% ORAL RINSE 15 ML: 1.2 LIQUID ORAL at 20:37

## 2018-09-17 RX ADMIN — Medication 10 ML: at 07:20

## 2018-09-17 RX ADMIN — FAMOTIDINE 20 MG: 20 TABLET, FILM COATED ORAL at 07:36

## 2018-09-17 RX ADMIN — KETOROLAC TROMETHAMINE 30 MG: 30 INJECTION, SOLUTION INTRAMUSCULAR at 20:37

## 2018-09-17 RX ADMIN — KETOROLAC TROMETHAMINE 30 MG: 30 INJECTION, SOLUTION INTRAMUSCULAR at 07:20

## 2018-09-17 RX ADMIN — CHLORHEXIDINE GLUCONATE 0.12% ORAL RINSE 15 ML: 1.2 LIQUID ORAL at 07:35

## 2018-09-17 RX ADMIN — KETOROLAC TROMETHAMINE 30 MG: 30 INJECTION, SOLUTION INTRAMUSCULAR at 00:33

## 2018-09-17 RX ADMIN — Medication 10 ML: at 20:38

## 2018-09-17 RX ADMIN — ONDANSETRON 4 MG: 2 INJECTION INTRAMUSCULAR; INTRAVENOUS at 21:44

## 2018-09-17 ASSESSMENT — PAIN SCALES - GENERAL
PAINLEVEL_OUTOF10: 6
PAINLEVEL_OUTOF10: 7
PAINLEVEL_OUTOF10: 6
PAINLEVEL_OUTOF10: 7
PAINLEVEL_OUTOF10: 6
PAINLEVEL_OUTOF10: 7

## 2018-09-17 ASSESSMENT — PAIN DESCRIPTION - DESCRIPTORS: DESCRIPTORS: HEADACHE

## 2018-09-17 ASSESSMENT — PAIN DESCRIPTION - LOCATION
LOCATION: HEAD
LOCATION: HEAD

## 2018-09-17 ASSESSMENT — PAIN DESCRIPTION - FREQUENCY: FREQUENCY: CONTINUOUS

## 2018-09-17 ASSESSMENT — PAIN DESCRIPTION - PAIN TYPE
TYPE: ACUTE PAIN
TYPE: ACUTE PAIN

## 2018-09-17 NOTE — PLAN OF CARE
Problem: COMMUNICATION IMPAIRMENT  Goal: Ability to express needs and understand communication  Outcome: Ongoing  Patient able to communicate without delayed responces or difficulties    Problem: Pain:  Goal: Pain level will decrease  Pain level will decrease   Outcome: Ongoing  Patient pain remains under control with current pain regimen    Problem: Musculor/Skeletal Functional Status  Goal: Highest potential functional level  Outcome: Ongoing    Goal: Absence of falls  Outcome: Ongoing  Patient has steady gait and evaluated by PT. No falls at this time.   Patient uses call light approprialtely

## 2018-09-17 NOTE — PROGRESS NOTES
tobacco history on file. Alcohol:      reports that he drinks alcohol. Drug Use:  reports that he uses drugs, including Cocaine. Family History:     History reviewed. No pertinent family history. Review of Systems:     ROS:    CONSTITUTIONAL: Positive for dizziness   EYES: negative for double vision and photophobia    HEENT: negative for tinnitus and sore throat   RESPIRATORY: negative for cough, shortness of breath   CARDIOVASCULAR: negative for chest pain, palpitations   GASTROINTESTINAL: Positive for nausea and vomiting   GENITOURINARY: negative for incontinence   MUSCULOSKELETAL: negative for neck or back pain   NEUROLOGICAL: negative for seizures   PSYCHIATRIC: negative for fatigue       Physical Exam:   /73   Pulse 56   Temp 98.3 °F (36.8 °C) (Oral)   Resp 11   Ht 6' (1.829 m)   Wt 191 lb 8 oz (86.9 kg)   SpO2 100%   BMI 25.97 kg/m²   Temp (24hrs), Av.7 °F (37.1 °C), Min:98.1 °F (36.7 °C), Max:99.2 °F (37.3 °C)    No results for input(s): POCGLU in the last 72 hours. Intake/Output Summary (Last 24 hours) at 18 1140  Last data filed at 18 0448   Gross per 24 hour   Intake                0 ml   Output              575 ml   Net             -575 ml       General Appearance:  alert, well appearing, and in no acute distress  Mental status: oriented to person, place, and time with normal affect  HEENT:  normocephalic, atraumatic. Lungs: Bilateral equal air entry, clear to ausculation, no wheezing, rales or rhonchi, normal effort  Cardiovascular: normal rate, regular rhythm, no murmur, gallop, rub.   Abdomen: Soft, nontender, nondistended, normal bowel sounds, no hepatomegaly or splenomegaly    NEUROLOGIC EXAMINATION  GENERAL  Appears comfortable and in no distress   HEENT  NC/ AT   NECK  Supple and no bruits heard   MENTAL STATUS:  Alert, oriented, intact memory, no confusion, normal speech, normal language, no hallucination or delusion   CRANIAL NERVES: II     -      Visual

## 2018-09-17 NOTE — PROGRESS NOTES
Dre  22.     Neurosurgery Service      Progress Note           Subjective :     No major events or new complaints through the night . Neurologically stable . No changes in mental status throughout the night . Slept well. A febrile . Hemodynamically stable . He still complaining of intermittent dizziness and nausea. No headache . No vision changes Tolerating fluids and diet well. No difficulty swallowing or vomiting . No problems with urination or bowel movements. No retention or incontinence . No numbness, tingling or weakness in upper or lower extremities. Has been ambulating with assist.        Objective :     Vitals:    09/16/18 1924 09/17/18 0033 09/17/18 0358 09/17/18 0825   BP: 121/76  108/73 107/73   Pulse: 61  (!) 44 56   Resp: 11   11   Temp: 99.2 °F (37.3 °C) 98.7 °F (37.1 °C) 98.4 °F (36.9 °C) 98.3 °F (36.8 °C)   TempSrc: Oral Oral Oral Oral   SpO2: 96%   100%   Weight:       Height:             Physical Examination :    Alert., Resting in bed, appears comfortable in no distress. Head: normocephalic, atraumatic, facial features unremarkable   Eyes:  PERRLA +3, EOM intact. No nystagmus or ptosis. ENT: Unremarkable. Tongue midline   Neck Supple, Normal ROM. Trachea midline . No midline spine tenderness . No step off . No pain with axial loading. No meningeal signs. No Carotid Bruit   Lungs - Clear to auscultation. No crackles or wheezes. Cardiovascular - S1, S2, regular rate and rhythm. Abdomen - soft, non-distended, non-tender, no peritoneal inflammation signs  Back:  No midline spine tenderness  Skin :  PWD. No open wounds , abrasions or contusions . No rash   Neuro-Muscular exam:  Awake and AOX4. Normal speech. Comprehension was normal. Follow commands. GCS 15/15. C2-12 Intact . PERRLA +3, EOM intact. Coordination is normal. No involuntary movements or tremors. DTR 2+ throughout, Plantar reflexes were downgoing bilaterally.  Sensation to light touch and

## 2018-09-17 NOTE — PROGRESS NOTES
Speech Language Pathology  Facility/Department: [facility] NEURO  Initial Speech/Language/Cognitive Assessment    NAME: Ken Bunch  : 1980   MRN: [id number]  ADMISSION DATE: 2018  ADMITTING DIAGNOSIS: has Pneumothorax; Cerebellar stroke ([facility] [address] and Cerebellar infarct [facility]) on his problem list.  DATE ONSET: 9/15/18    Date of Eval: 2018   Evaluating Therapist: Max Ruth    Primary Complaint:  The patient is a 34 y.o. male with No PMH who presents to ED transferred from [facility] due to Large Left Cerebellar Stroke. Patient went to sleep around 2:00 AM-3:00AM on (9/15/18) and woke up around 10:00AM with occipital headache 10/10, associated with nausea, non bloody vomiting and dizziness. He stayed at home and took some tylenol without resolution and around 9:00PM he went to [facility] for evaluation and Head CT Scan WO Contrast revealed Large Left Cerebellar infarction and he was transfer to [facility] for further stroke work up. Pain:  Pain Assessment  Patient Currently in Pain: No  Pain Assessment: 0-10  Pain Level: 6  Pain Type: Acute pain  Pain Location: Head  Pain Descriptors: Headache  Pain Frequency: Continuous  Pain Intervention(s): Ambulation/Increased activity, Distraction, Emotional support, Therapeutic presence  Response to Pain Intervention: Patient Satisfied    Assessment:  Pt presents with mild cognitive deficits characterized by impaired delayed recall. ST to follow up and provide treatment to address noted deficits. Education provided. ST recommends home independently at this time.            Recommendations:  Requires SLP Intervention: Yes  Duration/Frequency of Treatment: 3-5X/week  D/C Recommendations: Home independently     Goals:  Short-term Goals  Goal 1: Pt will recall 3-5 units of information with and without distractions with 90% accuracy   Goal 2: Pt will utilize compensatory strategies to aid him in recall tasks with 90% accuracy    Patient/family involved in developing goals and treatment plan: yes    Subjective:  General  Chart Reviewed: Yes  Family / Caregiver Present: No  Social/Functional History  Lives With: Family (Mother)  Active : Yes  Occupation: Self employed  Vision  Vision: Within Functional Limits  Hearing  Hearing: Within functional limits        Objective:     Oral/Motor  Oral Motor: Within functional limits    Auditory Comprehension  Comprehension: Within Functional Limits         Expression  Primary Mode of Expression: Verbal    Verbal Expression  Verbal Expression: Within functional limits         Motor Speech  Motor Speech: Within Functional Limits    Pragmatics/Social Functioning  Pragmatics: Within functional limits    Cognition:      Orientation  Overall Orientation Status: Within Normal Limits  Attention  Attention: Within Functional Limits  Memory  Memory: Exceptions to [facility]  Short-term Memory: Mild (3/3, 4/5, 3/3)  Working Memory: Moderate (1/3, 1/3)  Problem Solving  Problem Solving: Within Functional Limits  Abstract Reasoning  Abstract Reasoning: Within Functional Limits  Safety/Judgement  Safety/Judgement: Within Functional Limits            Prognosis:  Speech Therapy Prognosis  Prognosis: Excellent  Individuals consulted  Consulted and agree with results and recommendations: Patient    Education:  Patient Education: yes  Patient Education Response: Verbalizes understanding    G-Code:  SLP G-Codes  Functional Limitations: Memory  Memory Current Status (): At least 1 percent but less than 20 percent impaired, limited or restricted  Memory Goal Status (): 0 percent impaired, limited or restricted         Therapy Time:   Individual Concurrent Group Co-treatment   Time In 1320         Time Out 1330         Minutes 10               Completed by Pedro Porras,  Clinician    Co-signed by Osker Duverney. A.CCC/SLP   9/17/2018 1:48 PM

## 2018-09-17 NOTE — PROGRESS NOTES
Independent  Transfer Assistance: Independent  Active : Yes  Mode of Transportation: Car  Occupation: Self employed  Leisure & Hobbies: Watching sports  Objective          AROM RLE (degrees)  RLE AROM: WFL  AROM LLE (degrees)  LLE AROM : WFL  AROM RUE (degrees)  RUE AROM : WFL  AROM LUE (degrees)  LUE AROM : WFL  Strength RLE  Strength RLE: WFL  Strength LLE  Strength LLE: WFL  Strength RUE  Strength RUE: WFL  Strength LUE  Strength LUE: WFL     Sensation  Overall Sensation Status: Impaired  Additional Comments: Pt states \"It feels like my feet are asleep\", \"tingly\"  Bed mobility  Supine to Sit: Independent  Sit to Supine: Independent  Scooting: Independent  Transfers  Sit to Stand: Independent  Stand to sit: Independent  Ambulation  Ambulation?: Yes  Ambulation 1  Surface: level tile  Device: No Device  Assistance: Independent  Quality of Gait: Steady gait with no LOB  Distance: 350ft  Stairs/Curb  Stairs?: Yes  Stairs  # Steps : 8  Stairs Height: 6\"  Rails: Right ascending  Device: No Device  Assistance: Independent  Comment: Pt quick, instructed to move slower for safety      Balance  Posture: Good  Sitting - Static: Good  Sitting - Dynamic: Good  Standing - Static: Good  Standing - Dynamic: Good;-        Assessment   Assessment: Pt ambulated 350ft and navigated a full flight of stairs without LOB. Pt requires no additional acute PT, will defer continued acute PT at this time.   (co-eval with OT)  Prognosis: Good  Decision Making: Low Complexity  Patient Education: PT POC  No Skilled PT: Independent with functional mobility   REQUIRES PT FOLLOW UP: No  Activity Tolerance  Activity Tolerance: Patient Tolerated treatment well         Plan   Safety Devices  Type of devices: Call light within reach, Gait belt, Left in bed    G-Code  PT G-Codes  Functional Limitation: Mobility: Walking and moving around  Mobility: Walking and Moving Around Current Status (): 0 percent impaired, limited or

## 2018-09-18 ENCOUNTER — APPOINTMENT (OUTPATIENT)
Dept: CT IMAGING | Age: 38
DRG: 045 | End: 2018-09-18
Payer: MEDICAID

## 2018-09-18 ENCOUNTER — APPOINTMENT (OUTPATIENT)
Dept: CARDIAC CATH/INVASIVE PROCEDURES | Age: 38
DRG: 045 | End: 2018-09-18
Payer: MEDICAID

## 2018-09-18 VITALS
TEMPERATURE: 99.4 F | WEIGHT: 191.5 LBS | HEIGHT: 72 IN | SYSTOLIC BLOOD PRESSURE: 133 MMHG | OXYGEN SATURATION: 96 % | BODY MASS INDEX: 25.94 KG/M2 | HEART RATE: 77 BPM | RESPIRATION RATE: 20 BRPM | DIASTOLIC BLOOD PRESSURE: 87 MMHG

## 2018-09-18 LAB
LV EF: 58 %
LVEF MODALITY: NORMAL

## 2018-09-18 PROCEDURE — 93325 DOPPLER ECHO COLOR FLOW MAPG: CPT

## 2018-09-18 PROCEDURE — 2500000003 HC RX 250 WO HCPCS: Performed by: STUDENT IN AN ORGANIZED HEALTH CARE EDUCATION/TRAINING PROGRAM

## 2018-09-18 PROCEDURE — 6360000002 HC RX W HCPCS: Performed by: STUDENT IN AN ORGANIZED HEALTH CARE EDUCATION/TRAINING PROGRAM

## 2018-09-18 PROCEDURE — 70450 CT HEAD/BRAIN W/O DYE: CPT

## 2018-09-18 PROCEDURE — 6370000000 HC RX 637 (ALT 250 FOR IP): Performed by: INTERNAL MEDICINE

## 2018-09-18 PROCEDURE — 2709999900 HC NON-CHARGEABLE SUPPLY

## 2018-09-18 PROCEDURE — 93312 ECHO TRANSESOPHAGEAL: CPT

## 2018-09-18 PROCEDURE — 94762 N-INVAS EAR/PLS OXIMTRY CONT: CPT

## 2018-09-18 PROCEDURE — 6360000002 HC RX W HCPCS

## 2018-09-18 PROCEDURE — 99232 SBSQ HOSP IP/OBS MODERATE 35: CPT | Performed by: NURSE PRACTITIONER

## 2018-09-18 PROCEDURE — B24BZZ4 ULTRASONOGRAPHY OF HEART WITH AORTA, TRANSESOPHAGEAL: ICD-10-PCS | Performed by: INTERNAL MEDICINE

## 2018-09-18 RX ORDER — SODIUM CHLORIDE 0.9 % (FLUSH) 0.9 %
10 SYRINGE (ML) INJECTION PRN
Status: CANCELLED | OUTPATIENT
Start: 2018-09-18

## 2018-09-18 RX ORDER — ASPIRIN 81 MG/1
81 TABLET ORAL DAILY
Qty: 30 TABLET | Refills: 3 | Status: CANCELLED | OUTPATIENT
Start: 2018-09-18

## 2018-09-18 RX ORDER — FOLIC ACID 1 MG/1
1 TABLET ORAL DAILY
Qty: 30 TABLET | Refills: 3 | Status: ON HOLD | OUTPATIENT
Start: 2018-09-18 | End: 2019-05-13 | Stop reason: HOSPADM

## 2018-09-18 RX ORDER — ATORVASTATIN CALCIUM 40 MG/1
40 TABLET, FILM COATED ORAL NIGHTLY
Qty: 30 TABLET | Refills: 3 | Status: CANCELLED | OUTPATIENT
Start: 2018-09-18

## 2018-09-18 RX ORDER — FOLIC ACID 1 MG/1
1 TABLET ORAL DAILY
Qty: 30 TABLET | Refills: 3 | Status: CANCELLED | OUTPATIENT
Start: 2018-09-18

## 2018-09-18 RX ORDER — SODIUM CHLORIDE 0.9 % (FLUSH) 0.9 %
10 SYRINGE (ML) INJECTION EVERY 12 HOURS SCHEDULED
Status: CANCELLED | OUTPATIENT
Start: 2018-09-18

## 2018-09-18 RX ORDER — FAMOTIDINE 20 MG/1
20 TABLET, FILM COATED ORAL 2 TIMES DAILY
Qty: 60 TABLET | Refills: 3 | Status: ON HOLD | OUTPATIENT
Start: 2018-09-18 | End: 2018-09-24 | Stop reason: HOSPADM

## 2018-09-18 RX ORDER — ATORVASTATIN CALCIUM 40 MG/1
40 TABLET, FILM COATED ORAL NIGHTLY
Qty: 30 TABLET | Refills: 3 | Status: ON HOLD | OUTPATIENT
Start: 2018-09-18 | End: 2019-05-13 | Stop reason: HOSPADM

## 2018-09-18 RX ORDER — ASPIRIN 81 MG/1
81 TABLET ORAL DAILY
Qty: 30 TABLET | Refills: 3 | Status: SHIPPED | OUTPATIENT
Start: 2018-09-18 | End: 2019-12-26 | Stop reason: SDDI

## 2018-09-18 RX ORDER — FAMOTIDINE 20 MG/1
20 TABLET, FILM COATED ORAL 2 TIMES DAILY
Qty: 60 TABLET | Refills: 3 | Status: CANCELLED | OUTPATIENT
Start: 2018-09-18

## 2018-09-18 RX ORDER — CHLORPROMAZINE HYDROCHLORIDE 25 MG/ML
25 INJECTION INTRAMUSCULAR ONCE
Status: COMPLETED | OUTPATIENT
Start: 2018-09-18 | End: 2018-09-18

## 2018-09-18 RX ADMIN — DESMOPRESSIN ACETATE 40 MG: 0.2 TABLET ORAL at 20:05

## 2018-09-18 RX ADMIN — FAMOTIDINE 20 MG: 20 TABLET, FILM COATED ORAL at 20:05

## 2018-09-18 RX ADMIN — CHLORHEXIDINE GLUCONATE 0.12% ORAL RINSE 15 ML: 1.2 LIQUID ORAL at 20:05

## 2018-09-18 RX ADMIN — CHLORPROMAZINE HYDROCHLORIDE 25 MG: 25 INJECTION INTRAMUSCULAR at 02:02

## 2018-09-18 RX ADMIN — KETOROLAC TROMETHAMINE 30 MG: 30 INJECTION, SOLUTION INTRAMUSCULAR at 10:21

## 2018-09-18 RX ADMIN — KETOROLAC TROMETHAMINE 30 MG: 30 INJECTION, SOLUTION INTRAMUSCULAR at 02:02

## 2018-09-18 RX ADMIN — KETOROLAC TROMETHAMINE 30 MG: 30 INJECTION, SOLUTION INTRAMUSCULAR at 19:24

## 2018-09-18 ASSESSMENT — PAIN SCALES - GENERAL
PAINLEVEL_OUTOF10: 7
PAINLEVEL_OUTOF10: 6
PAINLEVEL_OUTOF10: 7

## 2018-09-18 NOTE — OP NOTE
[facility]  Transesophageal Echocardiogram       Today's Date:  9/18/2018  Ordering Physician:  Dr. Salome Machuca  Indication:   CVA    Operators:  Primary:   Dr. Winston Delacruz (Attending Physician)  Assistant:   Rio Pastrana MD (Cardiovascular Fellow)      Pre Procedure Conscious Sedation Data:    ASA Class:    [] I [x] II [] III [] IV    Mallampati Class:  [] I [x] II [] III [] IV    Procedure:    Patient seen and examined. History and Physical reviewed. Labs reviewed. After informed consent was obtained with explanation of the risks and benefits, the patient was brought to cardiac cath lab. All sedation was administered by the cardiologist. The oropharynx was pre-anesthesized with viscous lidocaine and cetacaine spray. The ultrasound probe was passed without any difficulty. JESSENIA findings:    LA:  Normal  REBEKA:  No thrombus  RA:  Normal  RV:   Normal  LV:  Normal  Estimated LVEF:  55%  Aorta:   Normal arch  Pericardium: No pericardial effusion  Septum:  No intracardiac shunt via color Doppler. No intracardiac shunt via injection of agitated saline. Valves:    Mitral Valve: Structurally normal. No regurgitation is identified. Aortic Valve: The aortic valve is trileaflet and opens adequately. No regurgitiation is identified. Tricuspid valve: Structurally normal. No regurgitation is identified. Pulmonary valve: Normal. No significant regurgitation    No valvular vegetations or thrombus identified. Summary:     1. A JESSENIA was performed without complications. 2. LVEF 55%  3. No thrombus or valvular vegetation identified  4. There were no complications encountered.   5. Return patient to room for further inpatient care per primary      Electronically signed by Rio Pastrana MD on 9/18/2018 at 4:57 PM  Cardiovascular Diseases Fellow  [address] [facility]        I have reviewed the case with resident / fellow  I have examined the patient personally  Agree with treatment plan,

## 2018-09-18 NOTE — PROGRESS NOTES
Pt has not urinated all day for me, pt bladder scanned for 784 mls, pt refuses to be straight cath'd. Pt states \"he will go piss when he feels the urge\" MD notified.

## 2018-09-18 NOTE — PROGRESS NOTES
Patient has been suspiciously avoiding making urine available for tox screen for the third consecutive day. When pressed on the matter 9/18 afternoon by nurse at instruction of attending to void in a cup the next time he urinates or have straight cath, he refused straight cath for urine sample, then became agitated and threatened to leave \"if that's how I'm going to be treated. \" Attending suspects cocaine-induced cerebral vasospasm as etiology of stroke. Cardiology consulted for JESSENIA.

## 2018-09-18 NOTE — PROGRESS NOTES
concerns.       Please contact neurosurgery with any changes in patients neurologic status      Jarocho Mcelroy MD, [facility]  Neurosurgery/Neuro Critical Care Service  Pager [phone]

## 2018-09-18 NOTE — PROGRESS NOTES
-  Left ventricle is normal in size Global left ventricular systolic function  is low normal  Estimated ejection fraction is 50 % . No significant valvular regurgitation or stenosis seen. Impression and Plan: Mr. Deborah Zapata is a 34 y.o. male with left cerebellar stroke. Continue ASA 10RN QD, Folic Acid 1mg QD, Lipitor 40mg QHS    JESSENIA pending; cardiology consulted    Neurosurgery is on board; F/U CT head today is stable    Awaiting urine tox, UA    Continue PT/OT; ambulates 350' without device     Comorbid conditions - history of cocaine abuse    Will follow with you.

## 2018-09-20 ENCOUNTER — APPOINTMENT (OUTPATIENT)
Dept: CT IMAGING | Age: 38
DRG: 241 | End: 2018-09-20
Payer: MEDICAID

## 2018-09-20 ENCOUNTER — HOSPITAL ENCOUNTER (INPATIENT)
Age: 38
LOS: 4 days | Discharge: HOME | DRG: 241 | End: 2018-09-24
Admitting: FAMILY MEDICINE
Payer: MEDICAID

## 2018-09-20 DIAGNOSIS — E86.0 DEHYDRATION: ICD-10-CM

## 2018-09-20 DIAGNOSIS — I63.9 CEREBELLAR STROKE (HCC): ICD-10-CM

## 2018-09-20 DIAGNOSIS — R42 DIZZINESS: Primary | ICD-10-CM

## 2018-09-20 DIAGNOSIS — R11.2 NON-INTRACTABLE VOMITING WITH NAUSEA, UNSPECIFIED VOMITING TYPE: ICD-10-CM

## 2018-09-20 DIAGNOSIS — R00.1 BRADYCARDIA: ICD-10-CM

## 2018-09-20 LAB
ALBUMIN SERPL-MCNC: 4.3 G/DL (ref 3.5–5.2)
ALBUMIN/GLOBULIN RATIO: ABNORMAL (ref 1–2.5)
ALP BLD-CCNC: 57 U/L (ref 40–129)
ALT SERPL-CCNC: 103 U/L (ref 5–41)
AMMONIA: 41 UMOL/L (ref 16–60)
AMPHETAMINE SCREEN URINE: NEGATIVE
AMYLASE: 57 U/L (ref 28–100)
ANION GAP SERPL CALCULATED.3IONS-SCNC: 12 MMOL/L (ref 9–17)
AST SERPL-CCNC: 68 U/L
BARBITURATE SCREEN URINE: NEGATIVE
BENZODIAZEPINE SCREEN, URINE: NEGATIVE
BILIRUB SERPL-MCNC: 1.15 MG/DL (ref 0.3–1.2)
BILIRUBIN DIRECT: 0.27 MG/DL
BILIRUBIN URINE: NEGATIVE
BILIRUBIN, INDIRECT: 0.88 MG/DL (ref 0–1)
BUN BLDV-MCNC: 19 MG/DL (ref 6–20)
BUN/CREAT BLD: 20 (ref 9–20)
BUPRENORPHINE URINE: NORMAL
CALCIUM SERPL-MCNC: 9.7 MG/DL (ref 8.6–10.4)
CANNABINOID SCREEN URINE: NEGATIVE
CHLORIDE BLD-SCNC: 99 MMOL/L (ref 98–107)
CO2: 28 MMOL/L (ref 20–31)
COCAINE METABOLITE, URINE: NEGATIVE
COLOR: YELLOW
COMMENT UA: NORMAL
CREAT SERPL-MCNC: 0.97 MG/DL (ref 0.7–1.2)
EKG ATRIAL RATE: 50 BPM
EKG P AXIS: 56 DEGREES
EKG P-R INTERVAL: 138 MS
EKG Q-T INTERVAL: 490 MS
EKG QRS DURATION: 98 MS
EKG QTC CALCULATION (BAZETT): 414 MS
EKG R AXIS: 29 DEGREES
EKG T AXIS: 45 DEGREES
EKG VENTRICULAR RATE: 43 BPM
GFR AFRICAN AMERICAN: >60 ML/MIN
GFR NON-AFRICAN AMERICAN: >60 ML/MIN
GFR SERPL CREATININE-BSD FRML MDRD: NORMAL ML/MIN/{1.73_M2}
GFR SERPL CREATININE-BSD FRML MDRD: NORMAL ML/MIN/{1.73_M2}
GLOBULIN: ABNORMAL G/DL (ref 1.5–3.8)
GLUCOSE BLD-MCNC: 95 MG/DL (ref 70–99)
GLUCOSE URINE: NEGATIVE
INR BLD: 1
KETONES, URINE: NEGATIVE
LACTIC ACID, SEPSIS WHOLE BLOOD: NORMAL MMOL/L (ref 0.5–1.9)
LACTIC ACID, SEPSIS: 1.2 MMOL/L (ref 0.5–1.9)
LEUKOCYTE ESTERASE, URINE: NEGATIVE
LIPASE: 22 U/L (ref 13–60)
MAGNESIUM: 2.2 MG/DL (ref 1.6–2.6)
MDMA URINE: NORMAL
METHADONE SCREEN, URINE: NEGATIVE
METHAMPHETAMINE, URINE: NORMAL
MYOGLOBIN: 56 NG/ML (ref 28–72)
NITRITE, URINE: NEGATIVE
OPIATES, URINE: NEGATIVE
OXYCODONE SCREEN URINE: NEGATIVE
PARTIAL THROMBOPLASTIN TIME: 28.7 SEC (ref 23–31)
PH UA: 6 (ref 5–8)
PHENCYCLIDINE, URINE: NEGATIVE
PHOSPHORUS: 4.2 MG/DL (ref 2.5–4.5)
POTASSIUM SERPL-SCNC: 3.9 MMOL/L (ref 3.7–5.3)
PROPOXYPHENE, URINE: NORMAL
PROTEIN UA: NEGATIVE
PROTHROMBIN TIME: 10.8 SEC (ref 9.7–11.6)
SERUM OSMOLALITY: 287 MOSM/KG (ref 282–298)
SODIUM BLD-SCNC: 139 MMOL/L (ref 135–144)
SPECIFIC GRAVITY UA: 1.01 (ref 1–1.03)
TEST INFORMATION: NORMAL
TOTAL PROTEIN: 6.9 G/DL (ref 6.4–8.3)
TRICYCLIC ANTIDEPRESSANTS, UR: NORMAL
TROPONIN INTERP: NORMAL
TROPONIN T: <0.03 NG/ML
TURBIDITY: CLEAR
URINE HGB: NEGATIVE
UROBILINOGEN, URINE: NORMAL

## 2018-09-20 PROCEDURE — 96375 TX/PRO/DX INJ NEW DRUG ADDON: CPT

## 2018-09-20 PROCEDURE — 80076 HEPATIC FUNCTION PANEL: CPT

## 2018-09-20 PROCEDURE — 83605 ASSAY OF LACTIC ACID: CPT

## 2018-09-20 PROCEDURE — 6360000002 HC RX W HCPCS

## 2018-09-20 PROCEDURE — 70450 CT HEAD/BRAIN W/O DYE: CPT

## 2018-09-20 PROCEDURE — 93005 ELECTROCARDIOGRAM TRACING: CPT

## 2018-09-20 PROCEDURE — 83735 ASSAY OF MAGNESIUM: CPT

## 2018-09-20 PROCEDURE — 6370000000 HC RX 637 (ALT 250 FOR IP)

## 2018-09-20 PROCEDURE — 82140 ASSAY OF AMMONIA: CPT

## 2018-09-20 PROCEDURE — 83930 ASSAY OF BLOOD OSMOLALITY: CPT

## 2018-09-20 PROCEDURE — 80048 BASIC METABOLIC PNL TOTAL CA: CPT

## 2018-09-20 PROCEDURE — 85730 THROMBOPLASTIN TIME PARTIAL: CPT

## 2018-09-20 PROCEDURE — 36415 COLL VENOUS BLD VENIPUNCTURE: CPT

## 2018-09-20 PROCEDURE — 6360000002 HC RX W HCPCS: Performed by: NURSE PRACTITIONER

## 2018-09-20 PROCEDURE — 87040 BLOOD CULTURE FOR BACTERIA: CPT

## 2018-09-20 PROCEDURE — 96374 THER/PROPH/DIAG INJ IV PUSH: CPT

## 2018-09-20 PROCEDURE — C9113 INJ PANTOPRAZOLE SODIUM, VIA: HCPCS | Performed by: FAMILY MEDICINE

## 2018-09-20 PROCEDURE — 6360000002 HC RX W HCPCS: Performed by: FAMILY MEDICINE

## 2018-09-20 PROCEDURE — 99285 EMERGENCY DEPT VISIT HI MDM: CPT

## 2018-09-20 PROCEDURE — 82150 ASSAY OF AMYLASE: CPT

## 2018-09-20 PROCEDURE — 2580000003 HC RX 258

## 2018-09-20 PROCEDURE — 74176 CT ABD & PELVIS W/O CONTRAST: CPT

## 2018-09-20 PROCEDURE — 2000000000 HC ICU R&B

## 2018-09-20 PROCEDURE — 96361 HYDRATE IV INFUSION ADD-ON: CPT

## 2018-09-20 PROCEDURE — 99223 1ST HOSP IP/OBS HIGH 75: CPT | Performed by: FAMILY MEDICINE

## 2018-09-20 PROCEDURE — 83874 ASSAY OF MYOGLOBIN: CPT

## 2018-09-20 PROCEDURE — 84100 ASSAY OF PHOSPHORUS: CPT

## 2018-09-20 PROCEDURE — 2580000003 HC RX 258: Performed by: FAMILY MEDICINE

## 2018-09-20 PROCEDURE — 6370000000 HC RX 637 (ALT 250 FOR IP): Performed by: FAMILY MEDICINE

## 2018-09-20 PROCEDURE — 84484 ASSAY OF TROPONIN QUANT: CPT

## 2018-09-20 PROCEDURE — 81003 URINALYSIS AUTO W/O SCOPE: CPT

## 2018-09-20 PROCEDURE — 85610 PROTHROMBIN TIME: CPT

## 2018-09-20 PROCEDURE — 80307 DRUG TEST PRSMV CHEM ANLYZR: CPT

## 2018-09-20 PROCEDURE — 83690 ASSAY OF LIPASE: CPT

## 2018-09-20 RX ORDER — BISACODYL 10 MG
10 SUPPOSITORY, RECTAL RECTAL DAILY PRN
Status: DISCONTINUED | OUTPATIENT
Start: 2018-09-20 | End: 2018-09-24 | Stop reason: HOSPADM

## 2018-09-20 RX ORDER — DEXTROSE MONOHYDRATE 50 MG/ML
100 INJECTION, SOLUTION INTRAVENOUS PRN
Status: DISCONTINUED | OUTPATIENT
Start: 2018-09-20 | End: 2018-09-24 | Stop reason: HOSPADM

## 2018-09-20 RX ORDER — 0.9 % SODIUM CHLORIDE 0.9 %
1000 INTRAVENOUS SOLUTION INTRAVENOUS ONCE
Status: COMPLETED | OUTPATIENT
Start: 2018-09-20 | End: 2018-09-20

## 2018-09-20 RX ORDER — MECLIZINE HCL 12.5 MG/1
25 TABLET ORAL ONCE
Status: COMPLETED | OUTPATIENT
Start: 2018-09-20 | End: 2018-09-20

## 2018-09-20 RX ORDER — FOLIC ACID 1 MG/1
1 TABLET ORAL DAILY
Status: DISCONTINUED | OUTPATIENT
Start: 2018-09-20 | End: 2018-09-24 | Stop reason: HOSPADM

## 2018-09-20 RX ORDER — SODIUM CHLORIDE 0.9 % (FLUSH) 0.9 %
10 SYRINGE (ML) INJECTION PRN
Status: DISCONTINUED | OUTPATIENT
Start: 2018-09-20 | End: 2018-09-24 | Stop reason: HOSPADM

## 2018-09-20 RX ORDER — PROMETHAZINE HYDROCHLORIDE 25 MG/ML
12.5 INJECTION, SOLUTION INTRAMUSCULAR; INTRAVENOUS EVERY 6 HOURS PRN
Status: DISCONTINUED | OUTPATIENT
Start: 2018-09-20 | End: 2018-09-24 | Stop reason: HOSPADM

## 2018-09-20 RX ORDER — FAMOTIDINE 20 MG/1
20 TABLET, FILM COATED ORAL 2 TIMES DAILY
Status: DISCONTINUED | OUTPATIENT
Start: 2018-09-20 | End: 2018-09-22

## 2018-09-20 RX ORDER — DEXAMETHASONE SODIUM PHOSPHATE 10 MG/ML
10 INJECTION, SOLUTION INTRAMUSCULAR; INTRAVENOUS ONCE
Status: COMPLETED | OUTPATIENT
Start: 2018-09-20 | End: 2018-09-20

## 2018-09-20 RX ORDER — CALCIUM CARBONATE 200(500)MG
1 TABLET,CHEWABLE ORAL DAILY PRN
Status: DISCONTINUED | OUTPATIENT
Start: 2018-09-20 | End: 2018-09-24 | Stop reason: HOSPADM

## 2018-09-20 RX ORDER — SODIUM CHLORIDE 0.9 % (FLUSH) 0.9 %
10 SYRINGE (ML) INJECTION EVERY 12 HOURS SCHEDULED
Status: DISCONTINUED | OUTPATIENT
Start: 2018-09-20 | End: 2018-09-24 | Stop reason: HOSPADM

## 2018-09-20 RX ORDER — CALCIUM CARBONATE 200(500)MG
1 TABLET,CHEWABLE ORAL DAILY PRN
Status: ON HOLD | COMMUNITY
End: 2019-05-13 | Stop reason: HOSPADM

## 2018-09-20 RX ORDER — ONDANSETRON 2 MG/ML
4 INJECTION INTRAMUSCULAR; INTRAVENOUS PRN
Status: DISCONTINUED | OUTPATIENT
Start: 2018-09-20 | End: 2018-09-24 | Stop reason: HOSPADM

## 2018-09-20 RX ORDER — ACETAMINOPHEN 325 MG/1
650 TABLET ORAL EVERY 4 HOURS PRN
Status: DISCONTINUED | OUTPATIENT
Start: 2018-09-20 | End: 2018-09-24 | Stop reason: HOSPADM

## 2018-09-20 RX ORDER — DEXAMETHASONE SODIUM PHOSPHATE 4 MG/ML
4 INJECTION, SOLUTION INTRA-ARTICULAR; INTRALESIONAL; INTRAMUSCULAR; INTRAVENOUS; SOFT TISSUE EVERY 6 HOURS
Status: DISCONTINUED | OUTPATIENT
Start: 2018-09-20 | End: 2018-09-22

## 2018-09-20 RX ORDER — ASPIRIN 81 MG/1
81 TABLET ORAL DAILY
Status: DISCONTINUED | OUTPATIENT
Start: 2018-09-20 | End: 2018-09-24 | Stop reason: HOSPADM

## 2018-09-20 RX ORDER — DEXTROSE MONOHYDRATE 25 G/50ML
12.5 INJECTION, SOLUTION INTRAVENOUS PRN
Status: DISCONTINUED | OUTPATIENT
Start: 2018-09-20 | End: 2018-09-24 | Stop reason: HOSPADM

## 2018-09-20 RX ORDER — ACETAMINOPHEN 325 MG/1
325 TABLET ORAL DAILY PRN
Status: ON HOLD | COMMUNITY
End: 2019-05-13 | Stop reason: HOSPADM

## 2018-09-20 RX ORDER — 0.9 % SODIUM CHLORIDE 0.9 %
10 VIAL (ML) INJECTION DAILY
Status: DISCONTINUED | OUTPATIENT
Start: 2018-09-20 | End: 2018-09-22

## 2018-09-20 RX ORDER — PROMETHAZINE HYDROCHLORIDE 25 MG/ML
12.5 INJECTION, SOLUTION INTRAMUSCULAR; INTRAVENOUS ONCE
Status: COMPLETED | OUTPATIENT
Start: 2018-09-20 | End: 2018-09-20

## 2018-09-20 RX ORDER — CHLORPROMAZINE HYDROCHLORIDE 25 MG/1
25 TABLET, FILM COATED ORAL 3 TIMES DAILY PRN
Status: DISCONTINUED | OUTPATIENT
Start: 2018-09-20 | End: 2018-09-24 | Stop reason: HOSPADM

## 2018-09-20 RX ORDER — POTASSIUM CHLORIDE 7.45 MG/ML
10 INJECTION INTRAVENOUS PRN
Status: DISCONTINUED | OUTPATIENT
Start: 2018-09-20 | End: 2018-09-24 | Stop reason: HOSPADM

## 2018-09-20 RX ORDER — POTASSIUM CHLORIDE 20 MEQ/1
40 TABLET, EXTENDED RELEASE ORAL PRN
Status: DISCONTINUED | OUTPATIENT
Start: 2018-09-20 | End: 2018-09-24 | Stop reason: HOSPADM

## 2018-09-20 RX ORDER — PANTOPRAZOLE SODIUM 40 MG/10ML
40 INJECTION, POWDER, LYOPHILIZED, FOR SOLUTION INTRAVENOUS DAILY
Status: DISCONTINUED | OUTPATIENT
Start: 2018-09-20 | End: 2018-09-22

## 2018-09-20 RX ORDER — PANTOPRAZOLE SODIUM 40 MG/1
40 TABLET, DELAYED RELEASE ORAL
Status: DISCONTINUED | OUTPATIENT
Start: 2018-09-21 | End: 2018-09-20

## 2018-09-20 RX ORDER — NICOTINE 21 MG/24HR
1 PATCH, TRANSDERMAL 24 HOURS TRANSDERMAL DAILY PRN
Status: DISCONTINUED | OUTPATIENT
Start: 2018-09-20 | End: 2018-09-24 | Stop reason: HOSPADM

## 2018-09-20 RX ORDER — NICOTINE POLACRILEX 4 MG
15 LOZENGE BUCCAL PRN
Status: DISCONTINUED | OUTPATIENT
Start: 2018-09-20 | End: 2018-09-24 | Stop reason: HOSPADM

## 2018-09-20 RX ORDER — POTASSIUM CHLORIDE 20MEQ/15ML
40 LIQUID (ML) ORAL PRN
Status: DISCONTINUED | OUTPATIENT
Start: 2018-09-20 | End: 2018-09-24 | Stop reason: HOSPADM

## 2018-09-20 RX ORDER — MAGNESIUM SULFATE 1 G/100ML
1 INJECTION INTRAVENOUS PRN
Status: DISCONTINUED | OUTPATIENT
Start: 2018-09-20 | End: 2018-09-24 | Stop reason: HOSPADM

## 2018-09-20 RX ORDER — ATORVASTATIN CALCIUM 40 MG/1
40 TABLET, FILM COATED ORAL NIGHTLY
Status: DISCONTINUED | OUTPATIENT
Start: 2018-09-20 | End: 2018-09-24 | Stop reason: HOSPADM

## 2018-09-20 RX ADMIN — PANTOPRAZOLE SODIUM 40 MG: 40 INJECTION, POWDER, FOR SOLUTION INTRAVENOUS at 21:46

## 2018-09-20 RX ADMIN — CHLORPROMAZINE HYDROCHLORIDE 25 MG: 25 TABLET, SUGAR COATED ORAL at 20:13

## 2018-09-20 RX ADMIN — MECLIZINE HYDROCHLORIDE 25 MG: 12.5 TABLET, FILM COATED ORAL at 14:00

## 2018-09-20 RX ADMIN — FAMOTIDINE 20 MG: 20 TABLET ORAL at 20:17

## 2018-09-20 RX ADMIN — Medication 10 ML: at 21:22

## 2018-09-20 RX ADMIN — DEXAMETHASONE SODIUM PHOSPHATE 10 MG: 10 INJECTION, SOLUTION INTRAMUSCULAR; INTRAVENOUS at 14:57

## 2018-09-20 RX ADMIN — SODIUM CHLORIDE 1000 ML: 9 INJECTION, SOLUTION INTRAVENOUS at 13:00

## 2018-09-20 RX ADMIN — PROMETHAZINE HYDROCHLORIDE 12.5 MG: 25 INJECTION, SOLUTION INTRAMUSCULAR; INTRAVENOUS at 21:43

## 2018-09-20 RX ADMIN — DEXAMETHASONE SODIUM PHOSPHATE 4 MG: 4 INJECTION, SOLUTION INTRAMUSCULAR; INTRAVENOUS at 21:48

## 2018-09-20 RX ADMIN — ACETAMINOPHEN 650 MG: 325 TABLET ORAL at 18:18

## 2018-09-20 RX ADMIN — Medication 10 ML: at 21:47

## 2018-09-20 RX ADMIN — PROMETHAZINE HYDROCHLORIDE 12.5 MG: 25 INJECTION INTRAMUSCULAR; INTRAVENOUS at 13:12

## 2018-09-20 RX ADMIN — ANTACID TABLETS 500 MG: 500 TABLET, CHEWABLE ORAL at 18:18

## 2018-09-20 ASSESSMENT — ENCOUNTER SYMPTOMS
ABDOMINAL PAIN: 1
COUGH: 0
TROUBLE SWALLOWING: 0
FACIAL SWELLING: 1
EYE REDNESS: 0
EYE PAIN: 0
NAUSEA: 1
VOMITING: 1
CONSTIPATION: 1
CHEST TIGHTNESS: 0
ABDOMINAL DISTENTION: 1

## 2018-09-20 ASSESSMENT — PAIN DESCRIPTION - PAIN TYPE
TYPE: ACUTE PAIN
TYPE: ACUTE PAIN

## 2018-09-20 ASSESSMENT — PAIN DESCRIPTION - DESCRIPTORS: DESCRIPTORS: THROBBING

## 2018-09-20 ASSESSMENT — PAIN DESCRIPTION - LOCATION: LOCATION: HEAD

## 2018-09-20 ASSESSMENT — PAIN SCALES - GENERAL
PAINLEVEL_OUTOF10: 5
PAINLEVEL_OUTOF10: 5

## 2018-09-20 ASSESSMENT — PAIN DESCRIPTION - ORIENTATION: ORIENTATION: POSTERIOR

## 2018-09-20 NOTE — H&P
[facility]    HISTORY AND PHYSICAL EXAMINATION            Date:   9/20/2018  Patient name:  Dorothea Mccabe  Date of admission:  9/20/2018 12:46 PM  MRN:   [id number]  Account:  [de-identified]  YOB: 1980  PCP:    No primary care provider on file. Room:   [address] [place] [address]  Code Status:    Full Code    Chief Complaint:     Chief Complaint   Patient presents with    Dizziness       History Obtained From:     patient, electronic medical record    History of Present Illness: The patient is a 34 y.o. Non-/non  male who presents with Dizziness   and he is admitted to the hospital for the management of cerebellar stroke. 35 yo m with h/o recent history of cerebellar stroke presented to ED with nausea, vomiting, continued dizziness, and headache. Patient states he was at [facility] ED on 9/16 due to severe dizziness, headache and difficulty with walking. Patient found to have large left cerebellar stroke and patient was transferred to [facility]. Patient was  Admitted to neurology service. Patient was also evaluated by neurosurgery and also by cardiology (for JESSENIA). Repeat CT was done on 9/18 which showed stable evolution of infarct and patient was discharged on 9/18. Today, patient presented to ED with similar symptoms. Patient states his symptoms never went away. Patient states he is not able to tolerated any oral intake. Patient also states he had not had BM for about a week and took whole box of laxative yesterday and had BM. Patient states his headache is located in left occipital region. Describes dizziness as room spinning. In ED, patient was found to be hypotensive with HR in 30's 50's. CT was repeated, which showed left inferior cerebellar infarct in evolution without evidence for hemorrhagic transformation. Patient was bolused with 1 L of NS.     Dr. Latoya Oglesby was contacted by ED physician, and it was decided to keep patient no wheezing, rales or rhonchi, normal effort  Cardiovascular: bradycardia,  regular rhythm, no murmur, gallop, rub. Abdomen: Soft, nontender, nondistended, normal bowel sounds, no hepatomegaly or splenomegaly  Neurologic:  normal muscle tone and bulk, normal speech, CN II-XII grossly unremarkable. motor strength 5/5 in all extremities. Finger to nose with no dysmetria, heel to shin unremarkable.    Skin: No gross lesions, rashes, bruising or bleeding on exposed skin area  Extremities:  peripheral pulses palpable, no pedal edema or calf pain with palpation        Investigations:      Laboratory Testing:  Recent Results (from the past 24 hour(s))   Ammonia    Collection Time: 09/20/18  1:00 PM   Result Value Ref Range    Ammonia 41 16 - 60 umol/L   Amylase    Collection Time: 09/20/18  1:00 PM   Result Value Ref Range    Amylase 57 28 - 100 U/L   APTT    Collection Time: 09/20/18  1:00 PM   Result Value Ref Range    PTT 28.7 23 - 31 sec   Lactate, Sepsis    Collection Time: 09/20/18  1:00 PM   Result Value Ref Range    Lactic Acid, Sepsis 1.2 0.5 - 1.9 mmol/L    Lactic Acid, Sepsis, Whole Blood NOT REPORTED 0.5 - 1.9 mmol/L   Lipase    Collection Time: 09/20/18  1:00 PM   Result Value Ref Range    Lipase 22 13 - 60 U/L   Liver Profile    Collection Time: 09/20/18  1:00 PM   Result Value Ref Range    Alb 4.3 3.5 - 5.2 g/dL    Alkaline Phosphatase 57 40 - 129 U/L     (H) 5 - 41 U/L    AST 68 (H) <40 U/L    Total Bilirubin 1.15 0.3 - 1.2 mg/dL    Bilirubin, Direct 0.27 <0.31 mg/dL    Bilirubin, Indirect 0.88 0.00 - 1.00 mg/dL    Total Protein 6.9 6.4 - 8.3 g/dL    Globulin NOT REPORTED 1.5 - 3.8 g/dL    Albumin/Globulin Ratio NOT REPORTED 1.0 - 2.5   Magnesium    Collection Time: 09/20/18  1:00 PM   Result Value Ref Range    Magnesium 2.2 1.6 - 2.6 mg/dL   Basic Metabolic Prof    Collection Time: 09/20/18  1:00 PM   Result Value Ref Range    Glucose 95 70 - 99 mg/dL    BUN 19 6 - 20 mg/dL    CREATININE 0.97 0.70 - 1.20 mg/dL    Bun/Cre Ratio 20 9 - 20    Calcium 9.7 8.6 - 10.4 mg/dL    Sodium 139 135 - 144 mmol/L    Potassium 3.9 3.7 - 5.3 mmol/L    Chloride 99 98 - 107 mmol/L    CO2 28 20 - 31 mmol/L    Anion Gap 12 9 - 17 mmol/L    GFR Non-African American >60 >60 mL/min    GFR African American >60 >60 mL/min    GFR Comment          GFR Staging NOT REPORTED    Osmolality    Collection Time: 09/20/18  1:00 PM   Result Value Ref Range    Serum Osmolality 287 282 - 298 mOsm/kg   Phosphorus, Inorg. Collection Time: 09/20/18  1:00 PM   Result Value Ref Range    Phosphorus 4.2 2.5 - 4.5 mg/dL   PT    Collection Time: 09/20/18  1:00 PM   Result Value Ref Range    Protime 10.8 9.7 - 11.6 sec    INR 1.0    EKG 12 Lead    Collection Time: 09/20/18  1:34 PM   Result Value Ref Range    Ventricular Rate 43 BPM    Atrial Rate 50 BPM    P-R Interval 138 ms    QRS Duration 98 ms    Q-T Interval 490 ms    QTc Calculation (Bazett) 414 ms    P Axis 56 degrees    R Axis 29 degrees    T Axis 45 degrees       Imaging/Diagnostics:    Ct Abdomen Pelvis Wo Contrast    Result Date: 9/20/2018  EXAMINATION: CT OF THE ABDOMEN AND PELVIS WITHOUT CONTRAST 9/20/2018 3:07 pm TECHNIQUE: CT of the abdomen and pelvis was performed without the administration of intravenous contrast. Multiplanar reformatted images are provided for review. Dose modulation, iterative reconstruction, and/or weight based adjustment of the mA/kV was utilized to reduce the radiation dose to as low as reasonably achievable. COMPARISON: 10/30/2015 HISTORY: ORDERING SYSTEM PROVIDED HISTORY: Abdominal pain FINDINGS: Lower Chest: Visualized lung bases are clear without focal airspace consolidation, sizeable effusion, or pneumothorax. No definite pulmonary nodules or masses. Base of the heart is normal in size without pericardial fluid collection. Organs: The liver, gallbladder, pancreas, and spleen are grossly within normal limits.   No evidence for intrahepatic or extrahepatic biliary ductal dilatation. GI/Bowel: There is no evidence for bowel obstruction or inflammation. No free intraperitoneal air or fluid. Nonspecific generalized wall thickening of the sigmoid colon. Small bowel loops are normal in caliber. Small hiatal hernia. Appendix is unremarkable. Pelvis: No evidence for free fluid. Peritoneum/Retroperitoneum: Adrenal glands are normal in size and configuration. The kidneys are normal in size without definite nephrolithiasis or hydronephrosis. The ureters run a nonobstructed course to a normal-appearing urinary bladder. Vasculature: The aorta is normal in caliber. No definite lymphadenopathy identified. Bones/Soft Tissues: No evidence for acute fracture or dislocation. No lytic or blastic lesions. No evidence for acute intra-abdominal or intrapelvic pathology. No bowel obstruction or inflammation. No evidence for nephrolithiasis or urinary obstruction. Small hiatal hernia. Nonspecific generalized wall thickening of the sigmoid colon, at least partially due to underdistention. Ct Head Wo Contrast    Result Date: 9/20/2018  EXAMINATION: CT OF THE HEAD WITHOUT CONTRAST  9/17/2018 11:52 am TECHNIQUE: CT of the head was performed without the administration of intravenous contrast. Dose modulation, iterative reconstruction, and/or weight based adjustment of the mA/kV was utilized to reduce the radiation dose to as low as reasonably achievable. COMPARISON: CT head 09/15/2018 HISTORY: ORDERING SYSTEM PROVIDED HISTORY: EVALUATE CEREBELLAR STROKE TECHNOLOGIST PROVIDED HISTORY: Initial evaluation. FINDINGS: BRAIN/VENTRICLES: There is a wedge-shaped area of hypoattenuation in the left cerebellum consistent with the known left cerebellar infarct. The ventricles and cisternal spaces are normal in size, shape, and configuration for the age of the patient. No other areas of abnormal attenuation are identified. There is no midline shift or mass effect.    No hemorrhage is mastoid air cells demonstrate no acute abnormality. SOFT TISSUES/SKULL:  No acute abnormality of the visualized skull or soft tissues. Left cerebellar hemisphere acute to subacute infarction, within the left posterior inferior cerebellar artery territory. Consider further characterization by MRI. Critical results were called by Dr. Chan Stapleton to Colin Apa on 9/15/2018 at 23:32. Xr Chest Portable    Result Date: 9/16/2018  EXAMINATION: SINGLE XRAY VIEW OF THE CHEST 9/16/2018 5:47 am COMPARISON: October 31, 2015 HISTORY: ORDERING SYSTEM PROVIDED HISTORY: stroke TECHNOLOGIST PROVIDED HISTORY: stroke FINDINGS: Surgical clips overlie the left lung apex. Cardiac monitoring leads overlie the chest.  Stable cardiomediastinal contours. No pleural effusion, pneumothorax, or focal airspace consolidation is apparent. No free subdiaphragmatic air. The osseous structures appear intact. No acute findings. Cta Neck W Contrast    Result Date: 9/17/2018  EXAMINATION: CTA OF THE HEAD WITH CONTRAST; CTA OF THE NECK 9/16/2018 2:54 am: TECHNIQUE: CTA of the head/brain was performed with the administration of intravenous contrast. Multiplanar reformatted images are provided for review. MIP images are provided for review. Dose modulation, iterative reconstruction, and/or weight based adjustment of the mA/kV was utilized to reduce the radiation dose to as low as reasonably achievable.; CTA of the neck was performed with the administration of intravenous contrast. Multiplanar reformatted images are provided for review. MIP images are provided for review. Stenosis of the internal carotid arteries measured using NASCET criteria. Dose modulation, iterative reconstruction, and/or weight based adjustment of the mA/kV was utilized to reduce the radiation dose to as low as reasonably achievable.  COMPARISON: None HISTORY: ORDERING SYSTEM PROVIDED HISTORY: STROKE TECHNOLOGIST PROVIDED HISTORY: FINDINGS: CTA NECK: AORTIC

## 2018-09-20 NOTE — ED PROVIDER NOTES
[address] [place] [address] ED  eMERGENCY dEPARTMENT eNCOUnter      Pt Name: Magaly Toribio  MRN: [id number]  [place] 1980  Date of evaluation: 9/20/2018  Provider: Vonda Cordero MD    CHIEF COMPLAINT       Chief Complaint   Patient presents with    Dizziness         HISTORY OF PRESENT ILLNESS  (Location/Symptom, Timing/Onset, Context/Setting, Quality, Duration, Modifying Factors, Severity.)   Magaly Toribio is a 34 y.o. male who presents to the emergency department Presents with a history of nausea vomiting associated with dizziness. He states that he's had headache and dizziness hour since he had a stroke on September 16, 2018. He had been in the hospital until being discharged from [facility]. Patient did have a history of right eye blindness and a large left cerebellar stroke. The right eye blindness is not associated with that. Does have a history of cocaine abuse in the past.      Nursing Notes were reviewed. ALLERGIES     Patient has no known allergies.     CURRENT MEDICATIONS       Previous Medications    ACETAMINOPHEN (TYLENOL) 325 MG TABLET    Take 325 mg by mouth daily as needed for Pain    ASPIRIN 81 MG EC TABLET    Take 1 tablet by mouth daily    ATORVASTATIN (LIPITOR) 40 MG TABLET    Take 1 tablet by mouth nightly    BISACODYL (DULCOLAX) 5 MG EC TABLET    Take 5 mg by mouth daily as needed for Constipation    CALCIUM CARBONATE (TUMS) 500 MG CHEWABLE TABLET    Take 1 tablet by mouth daily as needed for Heartburn    FAMOTIDINE (PEPCID) 20 MG TABLET    Take 1 tablet by mouth 2 times daily    FOLIC ACID (FOLVITE) 1 MG TABLET    Take 1 tablet by mouth daily       PAST MEDICAL HISTORY         Diagnosis Date    Anxiety     Blind right eye     Cerebellar stroke ([facility] [address] 9/16/2018    GERD (gastroesophageal reflux disease)     Lung collapse     Pneumonia     Psychiatric problem        SURGICAL HISTORY           Procedure Laterality Date    COLONOSCOPY      ENDOSCOPY, COLON, DIAGNOSTIC  EYE SURGERY      LOBECTOMY Left     lower lobe         FAMILY HISTORY     No family history on file. No family status information on file. SOCIAL HISTORY      reports that he has been smoking Cigarettes. He has a 20.00 pack-year smoking history. He does not have any smokeless tobacco history on file. He reports that he drinks alcohol. He reports that he uses drugs, including Cocaine. REVIEW OF SYSTEMS    (2-9 systems for level 4, 10 or more for level 5)     Review of Systems   Constitutional: Positive for activity change, appetite change, diaphoresis and fatigue. HENT: Positive for facial swelling. Negative for tinnitus and trouble swallowing. Eyes: Positive for visual disturbance. Negative for pain and redness. Respiratory: Negative for cough and chest tightness. Cardiovascular: Negative for chest pain and leg swelling. Gastrointestinal: Positive for abdominal distention, abdominal pain, constipation, nausea and vomiting. Genitourinary: Negative for dysuria and frequency. Musculoskeletal: Negative for gait problem and myalgias. Skin: Negative. Neurological: Positive for dizziness, weakness, light-headedness and headaches. Negative for seizures, syncope, facial asymmetry, speech difficulty and numbness. Psychiatric/Behavioral: Negative for agitation and behavioral problems. Except as noted above the remainder of the review of systems was reviewed and negative. PHYSICAL EXAM    (up to 7 for level 4, 8 or more for level 5)     ED Triage Vitals [09/20/18 1242]   BP Temp Temp src Pulse Resp SpO2 Height Weight   98/62 97.9 °F (36.6 °C) -- 51 18 100 % 6' (1.829 m) 187 lb 1 oz (84.9 kg)       Physical Exam   Constitutional: He is oriented to person, place, and time. He appears well-developed and well-nourished. HENT:   Head: Normocephalic and atraumatic.    Right Ear: External ear normal.   Left Ear: External ear normal.   Mouth/Throat: Oropharynx is clear and moist. No midline shift. ED BEDSIDE ULTRASOUND:   Performed by ED Physician - none    LABS:  Labs Reviewed   HEPATIC FUNCTION PANEL - Abnormal; Notable for the following:        Result Value     (*)     AST 68 (*)     All other components within normal limits   CULTURE BLOOD #1   CULTURE BLOOD #1   AMMONIA   AMYLASE   APTT   LACTATE, SEPSIS   LIPASE   MAGNESIUM   BASIC METABOLIC PANEL   OSMOLALITY   PHOSPHORUS   PROTIME-INR   URINE RT REFLEX TO CULTURE       All other labs were within normal range or not returned as of this dictation. EMERGENCY DEPARTMENT COURSE and DIFFERENTIAL DIAGNOSIS/MDM:   Vitals:    Vitals:    09/20/18 1457 09/20/18 1529 09/20/18 1543 09/20/18 1558   BP:  97/67 (!) 106/92 101/65   Pulse: 50 56 54 52   Resp:       Temp:       SpO2: 98% 100% 100% 100%   Weight:       Height:         External patches are placed patient remains hemodynamically stable he is admitted to the hospital in the intensive care unit medicine service. Given a liter of fluid as well as Phenergan and Zofran and Antivert. Before admitting to [facility]. [facility] I will discuss with neuro interventional S at [facility]. V's. Dr. Vicente Ryan I did discuss the case and patient will be admitted here at [facility]. [facility] in the ICU. I did give a dose of Decadron discontinued any narcotic medications or fluid boluses just encase there is any's component of cerebral edema. A critical care time on this patient is 45 minutes. CONSULTS:  IP CONSULT TO HOSPITALIST  IP CONSULT TO NEUROLOGY    PROCEDURES:  Not clinically indicated. FINAL IMPRESSION      1. Dizziness    2. Bradycardia    3. Dehydration    4. Non-intractable vomiting with nausea, unspecified vomiting type          DISPOSITION/PLAN   DISPOSITION Admitted 09/20/2018 03:32:05 PM      PATIENT REFERRED TO:   No follow-up provider specified.     DISCHARGE MEDICATIONS:     New Prescriptions    No medications on file           (Please note that portions of this note were completed with

## 2018-09-20 NOTE — PROGRESS NOTES
Transitions of Care Pharmacy Service   Medication Review    The patient's list of current home medications has been reviewed. Source(s) of information: patient, [facility] [address]    Based on information provided by the above source(s), I have updated the patient's home med list as described below. I changed or updated the following medications on the patient's home medication list:  Added · Tylenol 325mg daily PRN pain  · Tums 500mg daily PRN heartburn  · Bisacodyl 5mg daily PRN constipation       Please feel free to call me with any questions about this encounter. Thank you.     This note will be reviewed and co-signed by the Transitions of Care Pharmacist.    Jessie Jose, PharmD student  Transitions of Care Pharmacy Service  Phone:  [phone]  Fax: [phone]      Electronically signed by Jessie Jose on 9/20/2018 at 2:50 PM     Prior to Admission medications    Medication Sig       acetaminophen (TYLENOL) 325 MG tablet Take 325 mg by mouth daily as needed for Pain       calcium carbonate (TUMS) 500 MG chewable tablet Take 1 tablet by mouth daily as needed for Heartburn       bisacodyl (DULCOLAX) 5 MG EC tablet Take 5 mg by mouth daily as needed for Constipation       aspirin 81 MG EC tablet Take 1 tablet by mouth daily       atorvastatin (LIPITOR) 40 MG tablet Take 1 tablet by mouth nightly       famotidine (PEPCID) 20 MG tablet Take 1 tablet by mouth 2 times daily       folic acid (FOLVITE) 1 MG tablet Take 1 tablet by mouth daily

## 2018-09-20 NOTE — ED NOTES
Pt presents to ED ambulatory with steady gait c/o of nausea and vomiting with dizziness. Pt states he had a recent stroke to cerebellum (was seen here in ED and transferred to ST). Pt states he thinks he was discharged too soon from ST. Pt states balance issues and dizziness. SKin is pink, warm, and dry. Respirations are even and non-labored. Pt denies headache. PERRLA. PT is A&Ox4. Pt is hypotensive and bradycardic. Pt states these are both new findings.       Rober Miller RN  09/20/18 0146

## 2018-09-21 ENCOUNTER — APPOINTMENT (OUTPATIENT)
Dept: MRI IMAGING | Age: 38
DRG: 241 | End: 2018-09-21
Payer: MEDICAID

## 2018-09-21 LAB
ALBUMIN SERPL-MCNC: 4 G/DL (ref 3.5–5.2)
ALBUMIN/GLOBULIN RATIO: ABNORMAL (ref 1–2.5)
ALP BLD-CCNC: 53 U/L (ref 40–129)
ALT SERPL-CCNC: 85 U/L (ref 5–41)
ANION GAP SERPL CALCULATED.3IONS-SCNC: 12 MMOL/L (ref 9–17)
AST SERPL-CCNC: 42 U/L
BILIRUB SERPL-MCNC: 0.91 MG/DL (ref 0.3–1.2)
BILIRUBIN DIRECT: 0.24 MG/DL
BILIRUBIN, INDIRECT: 0.67 MG/DL (ref 0–1)
BUN BLDV-MCNC: 16 MG/DL (ref 6–20)
BUN/CREAT BLD: 18 (ref 9–20)
CALCIUM SERPL-MCNC: 9 MG/DL (ref 8.6–10.4)
CHLORIDE BLD-SCNC: 105 MMOL/L (ref 98–107)
CO2: 25 MMOL/L (ref 20–31)
CREAT SERPL-MCNC: 0.87 MG/DL (ref 0.7–1.2)
GFR AFRICAN AMERICAN: >60 ML/MIN
GFR NON-AFRICAN AMERICAN: >60 ML/MIN
GFR SERPL CREATININE-BSD FRML MDRD: ABNORMAL ML/MIN/{1.73_M2}
GFR SERPL CREATININE-BSD FRML MDRD: ABNORMAL ML/MIN/{1.73_M2}
GLOBULIN: ABNORMAL G/DL (ref 1.5–3.8)
GLUCOSE BLD-MCNC: 120 MG/DL (ref 75–110)
GLUCOSE BLD-MCNC: 148 MG/DL (ref 75–110)
GLUCOSE BLD-MCNC: 171 MG/DL (ref 75–110)
GLUCOSE BLD-MCNC: 193 MG/DL (ref 70–99)
HCT VFR BLD CALC: 45.6 % (ref 41–53)
HEMOGLOBIN: 15 G/DL (ref 13.5–17.5)
INR BLD: 1
MCH RBC QN AUTO: 30.2 PG (ref 26–34)
MCHC RBC AUTO-ENTMCNC: 32.9 G/DL (ref 31–37)
MCV RBC AUTO: 91.8 FL (ref 80–100)
MYOGLOBIN: 120 NG/ML (ref 28–72)
MYOGLOBIN: 92 NG/ML (ref 28–72)
NRBC AUTOMATED: NORMAL PER 100 WBC
PDW BLD-RTO: 12.5 % (ref 11.5–14.5)
PLATELET # BLD: 213 K/UL (ref 130–400)
PMV BLD AUTO: NORMAL FL (ref 6–12)
POTASSIUM SERPL-SCNC: 3.9 MMOL/L (ref 3.7–5.3)
PROTHROMBIN TIME: 10.7 SEC (ref 9.7–11.6)
RBC # BLD: 4.97 M/UL (ref 4.5–5.9)
SODIUM BLD-SCNC: 142 MMOL/L (ref 135–144)
TOTAL PROTEIN: 6.3 G/DL (ref 6.4–8.3)
TROPONIN INTERP: ABNORMAL
TROPONIN INTERP: ABNORMAL
TROPONIN T: <0.03 NG/ML
TROPONIN T: <0.03 NG/ML
WBC # BLD: 10.4 K/UL (ref 3.5–11)

## 2018-09-21 PROCEDURE — 84484 ASSAY OF TROPONIN QUANT: CPT

## 2018-09-21 PROCEDURE — 6360000002 HC RX W HCPCS: Performed by: NURSE PRACTITIONER

## 2018-09-21 PROCEDURE — 85610 PROTHROMBIN TIME: CPT

## 2018-09-21 PROCEDURE — 36415 COLL VENOUS BLD VENIPUNCTURE: CPT

## 2018-09-21 PROCEDURE — 97530 THERAPEUTIC ACTIVITIES: CPT

## 2018-09-21 PROCEDURE — 99232 SBSQ HOSP IP/OBS MODERATE 35: CPT | Performed by: FAMILY MEDICINE

## 2018-09-21 PROCEDURE — 6360000002 HC RX W HCPCS

## 2018-09-21 PROCEDURE — 6370000000 HC RX 637 (ALT 250 FOR IP): Performed by: FAMILY MEDICINE

## 2018-09-21 PROCEDURE — 99254 IP/OBS CNSLTJ NEW/EST MOD 60: CPT | Performed by: PSYCHIATRY & NEUROLOGY

## 2018-09-21 PROCEDURE — 80048 BASIC METABOLIC PNL TOTAL CA: CPT

## 2018-09-21 PROCEDURE — 2060000000 HC ICU INTERMEDIATE R&B

## 2018-09-21 PROCEDURE — 70551 MRI BRAIN STEM W/O DYE: CPT

## 2018-09-21 PROCEDURE — 6360000002 HC RX W HCPCS: Performed by: FAMILY MEDICINE

## 2018-09-21 PROCEDURE — 80076 HEPATIC FUNCTION PANEL: CPT

## 2018-09-21 PROCEDURE — G8979 MOBILITY GOAL STATUS: HCPCS

## 2018-09-21 PROCEDURE — 85027 COMPLETE CBC AUTOMATED: CPT

## 2018-09-21 PROCEDURE — 82947 ASSAY GLUCOSE BLOOD QUANT: CPT

## 2018-09-21 PROCEDURE — 2580000003 HC RX 258: Performed by: FAMILY MEDICINE

## 2018-09-21 PROCEDURE — 83874 ASSAY OF MYOGLOBIN: CPT

## 2018-09-21 PROCEDURE — 97162 PT EVAL MOD COMPLEX 30 MIN: CPT

## 2018-09-21 PROCEDURE — C9113 INJ PANTOPRAZOLE SODIUM, VIA: HCPCS | Performed by: FAMILY MEDICINE

## 2018-09-21 PROCEDURE — G8978 MOBILITY CURRENT STATUS: HCPCS

## 2018-09-21 RX ORDER — SODIUM CHLORIDE 9 MG/ML
INJECTION, SOLUTION INTRAVENOUS CONTINUOUS
Status: DISCONTINUED | OUTPATIENT
Start: 2018-09-21 | End: 2018-09-24 | Stop reason: HOSPADM

## 2018-09-21 RX ADMIN — ONDANSETRON 4 MG: 2 INJECTION INTRAMUSCULAR; INTRAVENOUS at 17:23

## 2018-09-21 RX ADMIN — FAMOTIDINE 20 MG: 20 TABLET ORAL at 20:57

## 2018-09-21 RX ADMIN — DEXAMETHASONE SODIUM PHOSPHATE 4 MG: 4 INJECTION, SOLUTION INTRAMUSCULAR; INTRAVENOUS at 03:23

## 2018-09-21 RX ADMIN — PROMETHAZINE HYDROCHLORIDE 12.5 MG: 25 INJECTION, SOLUTION INTRAMUSCULAR; INTRAVENOUS at 03:23

## 2018-09-21 RX ADMIN — Medication 10 ML: at 08:39

## 2018-09-21 RX ADMIN — PANTOPRAZOLE SODIUM 40 MG: 40 INJECTION, POWDER, FOR SOLUTION INTRAVENOUS at 08:39

## 2018-09-21 RX ADMIN — ANTACID TABLETS 500 MG: 500 TABLET, CHEWABLE ORAL at 19:46

## 2018-09-21 RX ADMIN — Medication 10 ML: at 20:58

## 2018-09-21 RX ADMIN — ONDANSETRON 4 MG: 2 INJECTION INTRAMUSCULAR; INTRAVENOUS at 10:58

## 2018-09-21 RX ADMIN — Medication 10 ML: at 09:00

## 2018-09-21 RX ADMIN — ASPIRIN 81 MG: 81 TABLET, COATED ORAL at 08:38

## 2018-09-21 RX ADMIN — DEXAMETHASONE SODIUM PHOSPHATE 4 MG: 4 INJECTION, SOLUTION INTRAMUSCULAR; INTRAVENOUS at 15:42

## 2018-09-21 RX ADMIN — ONDANSETRON 4 MG: 2 INJECTION INTRAMUSCULAR; INTRAVENOUS at 21:13

## 2018-09-21 RX ADMIN — INSULIN LISPRO 1 UNITS: 100 INJECTION, SOLUTION INTRAVENOUS; SUBCUTANEOUS at 08:41

## 2018-09-21 RX ADMIN — ATORVASTATIN CALCIUM 40 MG: 40 TABLET, FILM COATED ORAL at 20:57

## 2018-09-21 RX ADMIN — SODIUM CHLORIDE: 9 INJECTION, SOLUTION INTRAVENOUS at 14:47

## 2018-09-21 RX ADMIN — DEXAMETHASONE SODIUM PHOSPHATE 4 MG: 4 INJECTION, SOLUTION INTRAMUSCULAR; INTRAVENOUS at 20:58

## 2018-09-21 RX ADMIN — FOLIC ACID 1 MG: 1 TABLET ORAL at 08:38

## 2018-09-21 RX ADMIN — FAMOTIDINE 20 MG: 20 TABLET ORAL at 08:38

## 2018-09-21 RX ADMIN — CHLORPROMAZINE HYDROCHLORIDE 25 MG: 25 TABLET, SUGAR COATED ORAL at 21:00

## 2018-09-21 RX ADMIN — DEXAMETHASONE SODIUM PHOSPHATE 4 MG: 4 INJECTION, SOLUTION INTRAMUSCULAR; INTRAVENOUS at 08:40

## 2018-09-21 RX ADMIN — INSULIN LISPRO 1 UNITS: 100 INJECTION, SOLUTION INTRAVENOUS; SUBCUTANEOUS at 21:16

## 2018-09-21 RX ADMIN — CHLORPROMAZINE HYDROCHLORIDE 25 MG: 25 TABLET, SUGAR COATED ORAL at 10:57

## 2018-09-21 RX ADMIN — ENOXAPARIN SODIUM 40 MG: 40 INJECTION SUBCUTANEOUS at 08:39

## 2018-09-21 ASSESSMENT — PAIN DESCRIPTION - PAIN TYPE
TYPE: ACUTE PAIN
TYPE: ACUTE PAIN

## 2018-09-21 ASSESSMENT — PAIN SCALES - GENERAL
PAINLEVEL_OUTOF10: 4
PAINLEVEL_OUTOF10: 4
PAINLEVEL_OUTOF10: 0
PAINLEVEL_OUTOF10: 0

## 2018-09-21 ASSESSMENT — PAIN DESCRIPTION - LOCATION
LOCATION: HEAD
LOCATION: HEAD

## 2018-09-21 ASSESSMENT — PAIN DESCRIPTION - ORIENTATION: ORIENTATION: LEFT;POSTERIOR

## 2018-09-21 NOTE — CONSULTS
[facility]                               NEUROLOGY INPATIENT NEW PATIENT NOTE         PATIENT NAME: Jesse Alonzo  PATIENT MRN: [id number]  PRIMARY CARE PHYSICIAN: No primary care provider on file. CONSULT REQUESTED BY: Gera Vazquez MD      CHIEF COMPLAINTS: Dizziness         HPI:    Jesse Alonzo is a 34year old [facility] with recent large left cerebellar ischemic stroke, hospitalized at [facility] (9/16/2018- 9/18/2018), came to [facility] for persistent nausea/vomiting/dizziness. Nausea/vomiting/dizziness   Since last stroke, symptoms (room spinning, nausea, vomiting, unsteadiness) never went away. HA left occipital, sharp 4-5/1-, photophobia/phonophobia, constant, nothing made better, unsteadiness but no falls. Could not get food down because nausea/vomiting. Denies use substance since discharged. , last night in ED BP 98/62, HR 30s-40s     Last stroke: work up with severe occipital headache, associated with nausea/vomiting and dizziness. He underwent MRI, CTH, JESSENIA   Social: cocaine use   Psychiatric: anxiety   Other medical issues: right eye blindness, HF with EF 50%    NEUROLOGICAL TESTS  MRI brain 9/16/2018  Acute ischemic infarction within the left cerebellar hemisphere. CTA head, neck 9/16/2018  Left cerebellar hemisphere edema compatible with acute to subacute   infarction.  No acute arterial abnormality reliably identified.  The distal   left posterior inferior cerebellar artery is not well visualized but this may   be related to limited resolution of CTA technique.      Other tests  HCV ab positive  ESR 4  TSH 0.39  A1c 4.8  LDL 90  EF 50%      @ PREVIOUS WORKUP:     Lab Results   Component Value Date    WBC 10.4 09/21/2018    HGB 15.0 09/21/2018    HCT 45.6 09/21/2018    MCV 91.8 09/21/2018     09/21/2018         PAST MEDICAL HISTORY:         Diagnosis Date    Anxiety     Blind right eye     Cerebellar stroke

## 2018-09-21 NOTE — PROGRESS NOTES
(bedroom in basement )  Home Access: Stairs to enter with rails  ADL Assistance: Independent  Homemaking Assistance: Independent  Ambulation Assistance: Independent  Transfer Assistance: Independent  Active : Yes  Mode of Transportation: Car  Type of occupation:    Leisure & Hobbies: [place] [employer]   Objective     AROM RLE (degrees)  RLE AROM: WFL  AROM LLE (degrees)  LLE AROM : WFL  AROM RUE (degrees)  RUE AROM : WFL  AROM LUE (degrees)  LUE AROM : WFL  Strength RLE  Strength RLE: WFL  Strength LLE  Strength LLE: WFL  Strength RUE  Strength RUE: WFL  Strength LUE  Strength LUE: WFL  Motor Control  Gross Motor?: WFL  Sensation  Overall Sensation Status: Impaired (Lt side of face - numb;  Lt U/LE tingling/sleepy feeling 24/7. )  Bed mobility  Bridging: Independent  Rolling to Left: Independent  Rolling to Right: Independent  Supine to Sit: Independent  Sit to Supine: Independent  Transfers  Sit to Stand: Independent  Stand to sit: Independent  Bed to Chair: Independent  Stand Pivot Transfers: Independent  Ambulation  Ambulation?: Yes  Ambulation 1  Surface: level tile  Device: No Device  Assistance: Independent  Quality of Gait: Gait steady; symmetrical.  Pt without loss of balance with unchallenged gait. Distance: 150 ft x 1. Comments: 10 ft x 1 for high level dynamic balance deficits - toe walking, heel walking, heel to toe, retro. Pt CGA +1 with + dizziness increased with abnormal/ change in movement patterns. Balance  Posture: Good  Sitting - Static: Good  Sitting - Dynamic: Good  Standing - Static: Good  Standing - Dynamic: Good;-    Coordination - normal.     Assessment   Assessment: Pt presents with continued medical concerns post acute cerebellar CVA. Pt continues with a headache, nausea/vomiting, and episodes of dizziness that are so severe that leads to more nausea/ vomiting.   Pt was a functioning  and has a goal to return to work but doesn't know what he needs to

## 2018-09-21 NOTE — CONSULTS
palpable. Normal oral mucosa  Respiratory:  · Normal excursion and expansion without use of accessory muscles  · Resp Auscultation: Good respiratory effort. No for increased work of breathing. On auscultation: clear to auscultation bilaterally  Cardiovascular:  · Heart tones are crisp and normal. regular S1 and S2.  · Jugular venous pulsation Normal  · The carotid upstroke is normal in amplitude and contour without delay or bruit   Abdomen:   · soft  · Bowel sounds present  Extremities:  ·  No edema  Neurological:  · Alert and oriented. DATA:    Diagnostics:    EKG: sinus bradycardia with blocked PAC    JESSENIA 09/18/18    Summary:      1. A JESSENIA was performed without complications. 2. LVEF 55%  3. No thrombus or valvular vegetation identified  4. There were no complications encountered. 5. Return patient to room for further inpatient care per primary    Labs:     CBC:   Recent Labs      09/21/18   0543   WBC  10.4   HGB  15.0   HCT  45.6   PLT  213     BMP:   Recent Labs      09/20/18   1300  09/21/18   0543   NA  139  142   K  3.9  3.9   CO2  28  25   BUN  19  16   CREATININE  0.97  0.87   LABGLOM  >60  >60   GLUCOSE  95  193*     BNP: No results for input(s): BNP in the last 72 hours. PT/INR:   Recent Labs      09/20/18   1300  09/21/18   0543   PROTIME  10.8  10.7   INR  1.0  1.0     APTT:  Recent Labs      09/20/18   1300   APTT  28.7     CARDIAC ENZYMES:No results for input(s): CKTOTAL, CKMB, CKMBINDEX, TROPONINI in the last 72 hours.   FASTING LIPID PANEL:  Lab Results   Component Value Date    HDL 39 09/16/2018    TRIG 85 09/16/2018     LIVER PROFILE:  Recent Labs      09/20/18   1300  09/21/18   0543   AST  68*  42*   ALT  103*  85*   LABALBU  4.3  4.0       IMPRESSION:    Patient Active Problem List   Diagnosis    Pneumothorax    [facility]bellar stroke ([facility] [address]    Cerebellar infarct (HCC)    Bradycardia     Dizziness and intermittent bradycardia likely due to recent cerebellar infarction  Preserved LV

## 2018-09-21 NOTE — PROGRESS NOTES
Physical Therapy  DATE: 2018    NAME: Pam Alvarado  MRN: [id number]   : 1980    Patient not seen this date for Physical Therapy due to:  [] Blood transfusion in progress  [] Cancel by RN  [] Hemodialysis  []  Refusal by Patient   [] Spine Precautions   [] Strict Bedrest  [] Surgery  [x] Testing MRI      [] Other        [] PT being discontinued at this time. Patient independent. No further needs. [] PT being discontinued at this time as the patient has been transferred to hospice care. No further needs.     Cathy Garcia, PT

## 2018-09-21 NOTE — CARE COORDINATION
Case Management Initial Discharge Plan  Lana Carrillo,         Readmission Risk              Risk of Unplanned Readmission:        18               Met with:family member patient and mother to discuss discharge plans. Information verified: address, contacts, phone number, , insurance Yes  PCP: No primary care provider on file. Date of last visit: na    Insurance Provider: [facility]    Discharge Planning  Current Residence:  house  Living Arrangements:  Family Members   Home has 10 stories/10  stairs to climb  Support Systems:  Parent, Children  Current Services PTA:   none Agency:  none  Patient able to perform ADL's:Assisted  DME in home:  none  DME used to aid ambulation prior to admission:    none  DME used during admission:   none    Potential Assistance Needed:  N/A    Pharmacy: [facility] in [place] Medications:  Yes  Does patient want to participate in local refill/ meds to beds program?  N/A    Patient agreeable to home care: No  [place] of choice provided:  n/a      Type of Home Care Services:  None  Patient expects to be discharged to:  home    Prior SNF/Rehab Placement and Facility:  none  Agreeable to SNF/Rehab: No  [place] of choice provided: n/a   Evaluation: no    Expected Discharge date: Follow Up Appointment: Best Day/ Time:      Transportation provider: mother  Transportation arrangements needed for discharge: No    Discharge Plan: Met with pt at bedside  Pt readmitted with dizziness after new CVA on . Pt was discharge home with mother. No use of any DME. Discussed PT recommendation for OP PT. Pt is agreeable to OP PT. Will need script for discharge. Pt has new [place]. Pt is asking family and friends for reference for new PCP. Pt will dc home. NO dc needs anticipated.          Electronically signed by Micheline Rouse RN on 18 at 3:53 PM

## 2018-09-21 NOTE — PROGRESS NOTES
[facility]    Progress Note    9/21/2018    3:35 PM    Name:   Russell Rai  MRN:     [id number]     Acct:      [de-identified]   Room:   [facility] Day:  1  Admit Date:  9/20/2018 12:46 PM    PCP:   No primary care provider on file. Code Status:  Full Code    Subjective:     C/C:   Chief Complaint   Patient presents with    Dizziness     Interval History Status: improved. Patient states his headache and dizziness is better than yesterday. Patient has not had any bradycardia going down to 40's. Patient's HR in high 50's to 70's. Patient had one episode of vomiting. But states he feels better now. Patient admits using cocaine but states that last use was about 1 month ago. UDS negative. Brief History:     33 yo m with h/o recent history of cerebellar stroke presented to ED with nausea, vomiting, continued dizziness, and headache.      Patient states he was at [facility] ED on 9/16 due to severe dizziness, headache and difficulty with walking. Patient found to have large left cerebellar stroke and patient was transferred to [facility]. Patient was  Admitted to neurology service. Patient was also evaluated by neurosurgery and also by cardiology (for JESSENIA). Repeat CT was done on 9/18 which showed stable evolution of infarct and patient was discharged on 9/18. Patient came to ED on 9/20 with similar symptoms headache, dizziness, n/v.   Patient states he was not able to tolerate any po for about a week and had not had BM for a week. Pt took whole box of laxative and had BM before coming to ED. In ED, patient was found to be bradycardic and hypotensive. Patient was admitted to ICU for close monitoring.      Review of Systems:     Constitutional:  negative for chills, fevers, sweats  Respiratory:  negative for cough, dyspnea on exertion, shortness of breath, wheezing  Cardiovascular:  negative for chest pain, chest pressure/discomfort, lower extremity edema, palpitations  Gastrointestinal:  + abdominal pain, nausea, vomiting, negative for constipation, diarrhea  Neurological:  +  dizziness, headache (improving)     Medications: Allergies:  No Known Allergies    Current Meds:   Scheduled Meds:    folic acid  1 mg Oral Daily    famotidine  20 mg Oral BID    atorvastatin  40 mg Oral Nightly    aspirin  81 mg Oral Daily    sodium chloride flush  10 mL Intravenous 2 times per day    dexamethasone  4 mg Intravenous Q6H    insulin lispro  0-6 Units Subcutaneous TID WC    insulin lispro  0-3 Units Subcutaneous Nightly    pantoprazole  40 mg Intravenous Daily    And    sodium chloride (PF)  10 mL Intravenous Daily     Continuous Infusions:    sodium chloride 100 mL/hr at 18 1447    dextrose       PRN Meds: ondansetron, calcium carbonate, sodium chloride flush, potassium chloride **OR** potassium chloride **OR** potassium chloride, magnesium sulfate, magnesium hydroxide, bisacodyl, nicotine, acetaminophen, glucose, dextrose, glucagon (rDNA), dextrose, chlorproMAZINE, promethazine    Data:     Past Medical History:   has a past medical history of Anxiety; Blind right eye; Cerebellar stroke ([address] GERD (gastroesophageal reflux disease); Lung collapse; Pneumonia; and Psychiatric problem. Social History:   reports that he has been smoking Cigarettes. He has a 20.00 pack-year smoking history. He has quit using smokeless tobacco. His smokeless tobacco use included Chew. He reports that he drinks alcohol. He reports that he uses drugs, including Cocaine. Family History: History reviewed. No pertinent family history.     Vitals:  /65   Pulse 73   Temp 97.6 °F (36.4 °C) (Infrared)   Resp 18   Ht 6' (1.829 m)   Wt 186 lb (84.4 kg)   SpO2 99%   BMI 25.23 kg/m²   Temp (24hrs), Av.5 °F (36.4 °C), Min:97.3 °F (36.3 °C), Max:97.6 °F (36.4 °C)    Recent Labs      18   0738  18   1154   POCGLU  171*  120*       I/O (24Hr):     Intake/Output Summary (Last 24 hours) at 09/21/18 1535  Last data filed at 09/21/18 0539   Gross per 24 hour   Intake              400 ml   Output             2500 ml   Net            -2100 ml       Labs:    Hematology:  Recent Labs      09/20/18   1300  09/21/18   0543   WBC   --   10.4   RBC   --   4.97   HGB   --   15.0   HCT   --   45.6   MCV   --   91.8   MCH   --   30.2   MCHC   --   32.9   RDW   --   12.5   PLT   --   213   MPV   --   NOT REPORTED   INR  1.0  1.0     Chemistry:  Recent Labs      09/20/18   1300  09/20/18   1932  09/21/18   0129  09/21/18   0543   NA  139   --    --   142   K  3.9   --    --   3.9   CL  99   --    --   105   CO2  28   --    --   25   GLUCOSE  95   --    --   193*   BUN  19   --    --   16   CREATININE  0.97   --    --   0.87   MG  2.2   --    --    --    ANIONGAP  12   --    --   12   LABGLOM  >60   --    --   >60   GFRAA  >60   --    --   >60   CALCIUM  9.7   --    --   9.0   PHOS  4.2   --    --    --    TROPONINT   --   <0.03  <0.03  <0.03   MYOGLOBIN   --   56  120*  92*     Recent Labs      09/20/18   1300  09/21/18   0543  09/21/18   0738  09/21/18   1154   PROT  6.9  6.3*   --    --    LABALBU  4.3  4.0   --    --    AST  68*  42*   --    --    ALT  103*  85*   --    --    ALKPHOS  57  53   --    --    BILITOT  1.15  0.91   --    --    BILIDIR  0.27  0.24   --    --    AMMONIA  41   --    --    --    AMYLASE  57   --    --    --    LIPASE  22   --    --    --    POCGLU   --    --   171*  120*         Lab Results   Component Value Date/Time    SPECIAL RIGHT HAND, 12ML 09/20/2018 01:05 PM     Lab Results   Component Value Date/Time    CULTURE NO GROWTH 23 HOURS 09/20/2018 01:05 PM       Lab Results   Component Value Date    PHART 7.450 10/30/2015    DDN0QSQ 42.0 10/30/2015    PO2ART 114.0 10/30/2015    WOJ1BJH 28.8 10/30/2015    NBEA NOT REPORTED 10/30/2015    PBEA 4.7 10/30/2015    W9TJWDZN 99.0 10/30/2015    FIO2 CANNULA 10/30/2015       Radiology:    Ct artifact. There is no evidence for hemorrhagic conversion. No obvious intracranial hemorrhage, mass effect, or midline shift. ORBITS: The visualized portion of the orbits demonstrate no acute abnormality. SINUSES: The visualized paranasal sinuses and mastoid air cells demonstrate no acute abnormality. SOFT TISSUES/SKULL:  No acute abnormality of the visualized skull or soft tissues. Left inferior cerebellar infarct in evolution without evidence for hemorrhagic transformation. No intracranial hemorrhage, mass effect, or midline shift. Ct Head Wo Contrast    Result Date: 9/18/2018  EXAMINATION: CT OF THE HEAD WITHOUT CONTRAST  9/18/2018 9:07 am TECHNIQUE: CT of the head was performed without the administration of intravenous contrast. Dose modulation, iterative reconstruction, and/or weight based adjustment of the mA/kV was utilized to reduce the radiation dose to as low as reasonably achievable. COMPARISON: 09/17/2018, MRI brain 09/16/2018. HISTORY: ORDERING SYSTEM PROVIDED HISTORY: Evaluate cerebellar stroke. TECHNOLOGIST PROVIDED HISTORY: FINDINGS: BRAIN/VENTRICLES: Continued evolution of subacute left inferior cerebellar infarction in the posterior inferior cerebellar artery territory. There is parenchymal edema with mass effect and partial effacement of the 4th ventricle, similar to prior study. No evidence of hydrocephalus. No hemorrhagic transformation. Remainder of the gray-white matter differentiation is maintained. No new intracranial hemorrhage. No new acute infarctions. The basal cisterns are patent. ORBITS: The visualized portion of the orbits demonstrate no acute abnormality. SINUSES: The visualized paranasal sinuses and mastoid air cells demonstrate no acute abnormality. SOFT TISSUES/SKULL:  No acute abnormality of the visualized skull or soft tissues. Continued evolution of subacute left inferior cerebellar infarction. No hemorrhagic transformation or obstructive hydrocephalus. NECK 9/16/2018 2:54 am: TECHNIQUE: CTA of the head/brain was performed with the administration of intravenous contrast. Multiplanar reformatted images are provided for review. MIP images are provided for review. Dose modulation, iterative reconstruction, and/or weight based adjustment of the mA/kV was utilized to reduce the radiation dose to as low as reasonably achievable.; CTA of the neck was performed with the administration of intravenous contrast. Multiplanar reformatted images are provided for review. MIP images are provided for review. Stenosis of the internal carotid arteries measured using NASCET criteria. Dose modulation, iterative reconstruction, and/or weight based adjustment of the mA/kV was utilized to reduce the radiation dose to as low as reasonably achievable. COMPARISON: None HISTORY: ORDERING SYSTEM PROVIDED HISTORY: STROKE TECHNOLOGIST PROVIDED HISTORY: FINDINGS: CTA NECK: AORTIC ARCH/ARCH VESSELS: There is a normal branch pattern of the aortic arch. No significant stenosis is seen of the innominate artery or subclavian arteries. CAROTID ARTERIES: The common carotid arteries are normal in appearance without evidence of a flow limiting stenosis. The internal carotid arteries are normal in appearance without evidence of a flow limiting stenosis by NASCET criteria. No dissection or arterial injury is seen. VERTEBRAL ARTERIES: Dominant left vertebral artery nondominant diminutive right vertebral artery appear patent at the cervical level. SOFT TISSUES: The lung apices are clear. No cervical or superior mediastinal lymphadenopathy. The visualized portion of the larynx and pharynx appear unremarkable. The parotid, submandibular and thyroid glands demonstrate no acute abnormality. BONES: The visualized osseous structures appear unremarkable. CTA HEAD: ANTERIOR CIRCULATION: The internal carotid arteries are normal in course and caliber without focal stenosis.  The anterior cerebral and middle subclavian arteries. CAROTID ARTERIES: The common carotid arteries are normal in appearance without evidence of a flow limiting stenosis. The internal carotid arteries are normal in appearance without evidence of a flow limiting stenosis by NASCET criteria. No dissection or arterial injury is seen. VERTEBRAL ARTERIES: Dominant left vertebral artery nondominant diminutive right vertebral artery appear patent at the cervical level. SOFT TISSUES: The lung apices are clear. No cervical or superior mediastinal lymphadenopathy. The visualized portion of the larynx and pharynx appear unremarkable. The parotid, submandibular and thyroid glands demonstrate no acute abnormality. BONES: The visualized osseous structures appear unremarkable. CTA HEAD: ANTERIOR CIRCULATION: The internal carotid arteries are normal in course and caliber without focal stenosis. The anterior cerebral and middle cerebral arteries demonstrate no focal stenosis. POSTERIOR CIRCULATION: Distal right vertebral artery is markedly thinned. Origins of the posterior inferior cerebellar arteries are identified and appear patent. Distal left posterior inferior cerebellar artery is not well visualized, possibly due to technical limitations. Basilar artery is patent. Posterior cerebral arteries are patent. BRAIN: Hypodensity within the left cerebellar hemisphere with mild mass effect on the 4th ventricle. No hydrocephalus. Left cerebellar hemisphere edema compatible with acute to subacute infarction. No acute arterial abnormality reliably identified. The distal left posterior inferior cerebellar artery is not well visualized but this may be related to limited resolution of CTA technique.      Mri Brain Wo Contrast    Result Date: 9/21/2018  EXAMINATION: MRI OF THE BRAIN WITHOUT CONTRAST  9/21/2018 11:19 am TECHNIQUE: Multiplanar multisequence MRI of the brain was performed without the administration of intravenous contrast. COMPARISON: Noncontrast CT flow voids at the skullbase are maintained although the distal right vertebral artery flow void is diminutive in size. ORBITS: The visualized portion of the orbits demonstrate no acute abnormality. SINUSES: The visualized paranasal sinuses and mastoid air cells are without acute finding BONES/SOFT TISSUES: The bone marrow signal intensity appears normal. The soft tissues demonstrate no acute abnormality. Acute ischemic infarction within the left cerebellar hemisphere. Physical Examination:        General appearance:  alert, cooperative and no distress  Mental Status:  oriented to person, place and time and normal affect  Lungs:  clear to auscultation bilaterally, normal effort  Heart:  regular rate and rhythm, no murmur  Abdomen:  soft, nontender, nondistended, normal bowel sounds, no masses, hepatomegaly, splenomegaly  Extremities:  no edema, redness, tenderness in the calves  Skin:  no gross lesions, rashes, induration    Assessment:        Primary Problem  Cerebellar stroke [facility])    Active Hospital Problems    Diagnosis Date Noted    Bradycardia [R00.1] 09/20/2018    Cerebellar stroke ([facility] [address] [I63.9] 09/16/2018       Plan:        Large cerebellar stroke   - CT head with evolution of stroke. MRI expected evolution of stroke. - neurology consulted  - DC decadron   - continue aspirin, lipitor  - PT/OT eval and treat      Sinus bradycardia and hypotension  - improving.  - trop negative   - cardiology consulted - likely from cerebellar stroke. - IVF     GERD - IV protonix     Elevated LFT with hep c ab positive - pt denies any h/o hepatitis. States has been negative. FU OP       H/o drug abuse:   - cocaine and opiates positive in 2015.   - Reports that last use about a month ago.   - UDS negative this admission        Janel Carson MD  9/21/2018  3:35 PM

## 2018-09-22 LAB
ANION GAP SERPL CALCULATED.3IONS-SCNC: 7 MMOL/L (ref 9–17)
BUN BLDV-MCNC: 15 MG/DL (ref 6–20)
BUN/CREAT BLD: 17 (ref 9–20)
CALCIUM SERPL-MCNC: 8.7 MG/DL (ref 8.6–10.4)
CHLORIDE BLD-SCNC: 109 MMOL/L (ref 98–107)
CO2: 26 MMOL/L (ref 20–31)
CREAT SERPL-MCNC: 0.86 MG/DL (ref 0.7–1.2)
GFR AFRICAN AMERICAN: >60 ML/MIN
GFR NON-AFRICAN AMERICAN: >60 ML/MIN
GFR SERPL CREATININE-BSD FRML MDRD: ABNORMAL ML/MIN/{1.73_M2}
GFR SERPL CREATININE-BSD FRML MDRD: ABNORMAL ML/MIN/{1.73_M2}
GLUCOSE BLD-MCNC: 115 MG/DL (ref 75–110)
GLUCOSE BLD-MCNC: 119 MG/DL (ref 75–110)
GLUCOSE BLD-MCNC: 137 MG/DL (ref 70–99)
GLUCOSE BLD-MCNC: 149 MG/DL (ref 75–110)
MAGNESIUM: 2.1 MG/DL (ref 1.6–2.6)
POTASSIUM SERPL-SCNC: 4.4 MMOL/L (ref 3.7–5.3)
SODIUM BLD-SCNC: 142 MMOL/L (ref 135–144)

## 2018-09-22 PROCEDURE — 97530 THERAPEUTIC ACTIVITIES: CPT

## 2018-09-22 PROCEDURE — 2060000000 HC ICU INTERMEDIATE R&B

## 2018-09-22 PROCEDURE — 97166 OT EVAL MOD COMPLEX 45 MIN: CPT

## 2018-09-22 PROCEDURE — 6370000000 HC RX 637 (ALT 250 FOR IP): Performed by: FAMILY MEDICINE

## 2018-09-22 PROCEDURE — C9113 INJ PANTOPRAZOLE SODIUM, VIA: HCPCS | Performed by: FAMILY MEDICINE

## 2018-09-22 PROCEDURE — 82947 ASSAY GLUCOSE BLOOD QUANT: CPT

## 2018-09-22 PROCEDURE — 6360000002 HC RX W HCPCS: Performed by: NURSE PRACTITIONER

## 2018-09-22 PROCEDURE — 80048 BASIC METABOLIC PNL TOTAL CA: CPT

## 2018-09-22 PROCEDURE — 6360000002 HC RX W HCPCS

## 2018-09-22 PROCEDURE — 83735 ASSAY OF MAGNESIUM: CPT

## 2018-09-22 PROCEDURE — G8987 SELF CARE CURRENT STATUS: HCPCS

## 2018-09-22 PROCEDURE — 36415 COLL VENOUS BLD VENIPUNCTURE: CPT

## 2018-09-22 PROCEDURE — 99232 SBSQ HOSP IP/OBS MODERATE 35: CPT | Performed by: PSYCHIATRY & NEUROLOGY

## 2018-09-22 PROCEDURE — 99232 SBSQ HOSP IP/OBS MODERATE 35: CPT | Performed by: FAMILY MEDICINE

## 2018-09-22 PROCEDURE — 6360000002 HC RX W HCPCS: Performed by: FAMILY MEDICINE

## 2018-09-22 PROCEDURE — 2580000003 HC RX 258: Performed by: FAMILY MEDICINE

## 2018-09-22 PROCEDURE — G8988 SELF CARE GOAL STATUS: HCPCS

## 2018-09-22 RX ORDER — DOCUSATE SODIUM 100 MG/1
100 CAPSULE, LIQUID FILLED ORAL 2 TIMES DAILY
Status: DISCONTINUED | OUTPATIENT
Start: 2018-09-22 | End: 2018-09-24 | Stop reason: HOSPADM

## 2018-09-22 RX ORDER — PANTOPRAZOLE SODIUM 40 MG/1
40 TABLET, DELAYED RELEASE ORAL
Status: DISCONTINUED | OUTPATIENT
Start: 2018-09-23 | End: 2018-09-23

## 2018-09-22 RX ADMIN — Medication 10 ML: at 09:11

## 2018-09-22 RX ADMIN — SODIUM CHLORIDE: 9 INJECTION, SOLUTION INTRAVENOUS at 21:07

## 2018-09-22 RX ADMIN — ATORVASTATIN CALCIUM 40 MG: 40 TABLET, FILM COATED ORAL at 22:16

## 2018-09-22 RX ADMIN — ONDANSETRON 4 MG: 2 INJECTION INTRAMUSCULAR; INTRAVENOUS at 02:20

## 2018-09-22 RX ADMIN — ONDANSETRON 4 MG: 2 INJECTION INTRAMUSCULAR; INTRAVENOUS at 09:10

## 2018-09-22 RX ADMIN — DEXAMETHASONE SODIUM PHOSPHATE 4 MG: 4 INJECTION, SOLUTION INTRAMUSCULAR; INTRAVENOUS at 02:26

## 2018-09-22 RX ADMIN — FOLIC ACID 1 MG: 1 TABLET ORAL at 09:11

## 2018-09-22 RX ADMIN — PANTOPRAZOLE SODIUM 40 MG: 40 INJECTION, POWDER, FOR SOLUTION INTRAVENOUS at 09:10

## 2018-09-22 RX ADMIN — CHLORPROMAZINE HYDROCHLORIDE 25 MG: 25 TABLET, SUGAR COATED ORAL at 15:48

## 2018-09-22 RX ADMIN — CHLORPROMAZINE HYDROCHLORIDE 25 MG: 25 TABLET, SUGAR COATED ORAL at 22:18

## 2018-09-22 RX ADMIN — ONDANSETRON 4 MG: 2 INJECTION INTRAMUSCULAR; INTRAVENOUS at 13:15

## 2018-09-22 RX ADMIN — DEXAMETHASONE SODIUM PHOSPHATE 4 MG: 4 INJECTION, SOLUTION INTRAMUSCULAR; INTRAVENOUS at 15:44

## 2018-09-22 RX ADMIN — DOCUSATE SODIUM 100 MG: 100 CAPSULE, LIQUID FILLED ORAL at 10:43

## 2018-09-22 RX ADMIN — ANTACID TABLETS 500 MG: 500 TABLET, CHEWABLE ORAL at 22:16

## 2018-09-22 RX ADMIN — ASPIRIN 81 MG: 81 TABLET, COATED ORAL at 09:11

## 2018-09-22 RX ADMIN — SODIUM CHLORIDE: 9 INJECTION, SOLUTION INTRAVENOUS at 00:37

## 2018-09-22 RX ADMIN — CHLORPROMAZINE HYDROCHLORIDE 25 MG: 25 TABLET, SUGAR COATED ORAL at 09:10

## 2018-09-22 RX ADMIN — ONDANSETRON 4 MG: 2 INJECTION INTRAMUSCULAR; INTRAVENOUS at 19:54

## 2018-09-22 RX ADMIN — FAMOTIDINE 20 MG: 20 TABLET ORAL at 09:10

## 2018-09-22 RX ADMIN — DOCUSATE SODIUM 100 MG: 100 CAPSULE, LIQUID FILLED ORAL at 22:16

## 2018-09-22 RX ADMIN — DEXAMETHASONE SODIUM PHOSPHATE 4 MG: 4 INJECTION, SOLUTION INTRAMUSCULAR; INTRAVENOUS at 09:10

## 2018-09-22 RX ADMIN — ONDANSETRON 4 MG: 2 INJECTION INTRAMUSCULAR; INTRAVENOUS at 19:08

## 2018-09-22 ASSESSMENT — PAIN DESCRIPTION - LOCATION
LOCATION: ABDOMEN;HEAD
LOCATION: HEAD

## 2018-09-22 ASSESSMENT — PAIN SCALES - GENERAL
PAINLEVEL_OUTOF10: 4
PAINLEVEL_OUTOF10: 2

## 2018-09-22 ASSESSMENT — PAIN DESCRIPTION - FREQUENCY: FREQUENCY: INTERMITTENT

## 2018-09-22 ASSESSMENT — PAIN DESCRIPTION - DESCRIPTORS
DESCRIPTORS: ACHING;HEADACHE
DESCRIPTORS: ACHING

## 2018-09-22 ASSESSMENT — PAIN DESCRIPTION - ONSET: ONSET: ON-GOING

## 2018-09-22 ASSESSMENT — PAIN DESCRIPTION - PROGRESSION: CLINICAL_PROGRESSION: NOT CHANGED

## 2018-09-22 ASSESSMENT — PAIN DESCRIPTION - ORIENTATION: ORIENTATION: LOWER

## 2018-09-22 NOTE — PLAN OF CARE
Problem: Falls - Risk of:  Goal: Will remain free from falls  Will remain free from falls   Outcome: Ongoing  Fall risk assessment done, bed locked in low position,falling star in place,  call light in reach at all times and encouraged to use for assist. Pathway to bathroom clutter-free and non skid socks on. Continue with hourly rounding, monitor for change. Pt remains free of falls/injury. Pt observed up steady and denies dizziness.

## 2018-09-22 NOTE — PROGRESS NOTES
40 mg  40 mg Intravenous Daily Bakari Llamas MD   40 mg at 09/22/18 0910    And    sodium chloride (PF) 0.9 % injection 10 mL  10 mL Intravenous Daily Bakari Llamas MD   10 mL at 09/22/18 0911    chlorproMAZINE (THORAZINE) tablet 25 mg  25 mg Oral TID PRN PRICILA Babcock CNP   25 mg at 09/22/18 0910    promethazine (PHENERGAN) injection 12.5 mg  12.5 mg Intravenous Q6H PRN PRICILA Babcock CNP   12.5 mg at 09/21/18 0323       LABS & TESTS:      Lab Results   Component Value Date    WBC 10.4 09/21/2018    HGB 15.0 09/21/2018    HCT 45.6 09/21/2018    MCV 91.8 09/21/2018     09/21/2018       VITALS  /70   Pulse 71   Temp 98.4 °F (36.9 °C) (Oral)   Resp 18   Ht 6' (1.829 m)   Wt 186 lb (84.4 kg)   SpO2 98%   BMI 25.23 kg/m²       PHYSICAL EXAMINATIONS:     General appearance: cooperative  Skin: no rash or skin lesions. HEENT: normocephalic  Optic Fundi: deferred  Neck: supple, no cervcical adenopathy or carotid bruit  Lungs: clear to auscultation  Heart: Regular rate and rhythm, normal S1, S2. No murmurs, clicks or gallops. Peripheral pulses: radial pulses palpable  Abdominal: BS present, soft, NT, ND  Extremities: no edema    NEUROLOGICAL EXAMINATION:     MS: awake, alert and oriented. No aphasia, dysarthria, or neglect  CNs: PERRLA, EOMI, right eye blind (old), sensation decreased on left V1-V3, face symmetric, hearing intact, soft palate rises on phonation, sternocleidomastoid and trapezius intact. Tongue midline, no fasciculations. Motor: no abnormal movements, tone and bulk okay. RUE: delta 5/5, biceps 5/5, triceps 5/5,  5/5  LUE: delta 5/5, biceps 5/5, triceps 5/5,  5/5  RLE: hf 5/5, ke 5/5, df 5/5, pf 5/5  LLE: hf 5/5, ke 5/5, df 5/5, pf 5/5  Reflexes: 1+ throughout, symmetric, babinski not present.   Coordination: FNF no dysmetria, heel to shin okay, SERAFIN okay, Rhomberg deferred  Gait: deferred   Sensory: Normal to light touch/temp/pp/vibration,

## 2018-09-22 NOTE — PROGRESS NOTES
[facility]    Progress Note    9/22/2018    4:02 PM    Name:   Geovanni Silverman  MRN:     [id number]     Acct:      [de-identified]   Room:   [facility] Day:  2  Admit Date:  9/20/2018 12:46 PM    PCP:   No primary care provider on file. Code Status:  Full Code    Subjective:     C/C:   Chief Complaint   Patient presents with    Dizziness     Interval History Status: improved. No acute event overnight. Patient continues to state he is having severe abdominal pain and nausea. Patient states Zofran is helping and has not had vomiting. Patient reports eating big portion of roasted beef for dinner. Patient states he was not able to sleep as food was keep coming up when he lies flat and was afraid of choking on them. Patient reports headache is better today         Brief History:     35 yo m with h/o recent history of cerebellar stroke presented to ED with nausea, vomiting, continued dizziness, and headache.      Patient states he was at [facility] ED on 9/16 due to severe dizziness, headache and difficulty with walking. Patient found to have large left cerebellar stroke and patient was transferred to [facility]. Patient was  Admitted to neurology service. Patient was also evaluated by neurosurgery and also by cardiology (for JESSENIA). Repeat CT was done on 9/18 which showed stable evolution of infarct and patient was discharged on 9/18. Patient came to ED on 9/20 with similar symptoms headache, dizziness, n/v.   Patient states he was not able to tolerate any po for about a week and had not had BM for a week. Pt took whole box of laxative and had BM before coming to ED. In ED, patient was found to be bradycardic and hypotensive. Patient was admitted to ICU for close monitoring.      Review of Systems:     Constitutional:  negative for chills, fevers, sweats  Respiratory:  negative for cough, dyspnea on exertion, shortness of breath, wheezing  Cardiovascular: °C)    Recent Labs      09/21/18   1154  09/21/18 2041 09/22/18   0811  09/22/18   1219   POCGLU  120*  148*  119*  115*       I/O (24Hr):     Intake/Output Summary (Last 24 hours) at 09/22/18 1602  Last data filed at 09/22/18 1254   Gross per 24 hour   Intake           161.67 ml   Output             2250 ml   Net         -2088.33 ml       Labs:    Hematology:  Recent Labs      09/20/18   1300  09/21/18   0543   WBC   --   10.4   RBC   --   4.97   HGB   --   15.0   HCT   --   45.6   MCV   --   91.8   MCH   --   30.2   MCHC   --   32.9   RDW   --   12.5   PLT   --   213   MPV   --   NOT REPORTED   INR  1.0  1.0     Chemistry:  Recent Labs      09/20/18   1300  09/20/18   1932  09/21/18   0129  09/21/18   0543  09/22/18   0742   NA  139   --    --   142  142   K  3.9   --    --   3.9  4.4   CL  99   --    --   105  109*   CO2  28   --    --   25  26   GLUCOSE  95   --    --   193*  137*   BUN  19   --    --   16  15   CREATININE  0.97   --    --   0.87  0.86   MG  2.2   --    --    --   2.1   ANIONGAP  12   --    --   12  7*   LABGLOM  >60   --    --   >60  >60   GFRAA  >60   --    --   >60  >60   CALCIUM  9.7   --    --   9.0  8.7   PHOS  4.2   --    --    --    --    TROPONINT   --   <0.03  <0.03  <0.03   --    MYOGLOBIN   --   56  120*  92*   --      Recent Labs      09/20/18   1300  09/21/18   0543  09/21/18   0738  09/21/18   1154  09/21/18 2041 09/22/18   0811  09/22/18   1219   PROT  6.9  6.3*   --    --    --    --    --    LABALBU  4.3  4.0   --    --    --    --    --    AST  68*  42*   --    --    --    --    --    ALT  103*  85*   --    --    --    --    --    ALKPHOS  57  53   --    --    --    --    --    BILITOT  1.15  0.91   --    --    --    --    --    BILIDIR  0.27  0.24   --    --    --    --    --    AMMONIA  41   --    --    --    --    --    --    AMYLASE  57   --    --    --    --    --    --    LIPASE  22   --    --    --    --    --    --    POCGLU   --    --   171*  120*  148*  119* urinary bladder. Vasculature: The aorta is normal in caliber. No definite lymphadenopathy identified. Bones/Soft Tissues: No evidence for acute fracture or dislocation. No lytic or blastic lesions. No evidence for acute intra-abdominal or intrapelvic pathology. No bowel obstruction or inflammation. No evidence for nephrolithiasis or urinary obstruction. Small hiatal hernia. Nonspecific generalized wall thickening of the sigmoid colon, at least partially due to underdistention. Ct Head Wo Contrast    Result Date: 9/20/2018  EXAMINATION: CT OF THE HEAD WITHOUT CONTRAST  9/17/2018 11:52 am TECHNIQUE: CT of the head was performed without the administration of intravenous contrast. Dose modulation, iterative reconstruction, and/or weight based adjustment of the mA/kV was utilized to reduce the radiation dose to as low as reasonably achievable. COMPARISON: CT head 09/15/2018 HISTORY: ORDERING SYSTEM PROVIDED HISTORY: EVALUATE CEREBELLAR STROKE TECHNOLOGIST PROVIDED HISTORY: Initial evaluation. FINDINGS: BRAIN/VENTRICLES: There is a wedge-shaped area of hypoattenuation in the left cerebellum consistent with the known left cerebellar infarct. The ventricles and cisternal spaces are normal in size, shape, and configuration for the age of the patient. No other areas of abnormal attenuation are identified. There is no midline shift or mass effect. No hemorrhage is identified in the brain parenchyma. ORBITS: The visualized portion of the orbits demonstrate no acute abnormality. SINUSES:  The visualized paranasal sinuses and mastoid air cells are clear. SOFT TISSUES/SKULL:  No acute abnormality of the visualized skull or soft tissues. Stable appearance to a left cerebellar infarct. No new or acute finding.      Ct Head Wo Contrast    Result Date: 9/20/2018  EXAMINATION: CT OF THE HEAD WITHOUT CONTRAST  9/20/2018 1:24 pm TECHNIQUE: CT of the head was performed without the administration of intravenous PROVIDED HISTORY: stroke TECHNOLOGIST PROVIDED HISTORY: stroke FINDINGS: Surgical clips overlie the left lung apex. Cardiac monitoring leads overlie the chest.  Stable cardiomediastinal contours. No pleural effusion, pneumothorax, or focal airspace consolidation is apparent. No free subdiaphragmatic air. The osseous structures appear intact. No acute findings. Cta Neck W Contrast    Result Date: 9/17/2018  EXAMINATION: CTA OF THE HEAD WITH CONTRAST; CTA OF THE NECK 9/16/2018 2:54 am: TECHNIQUE: CTA of the head/brain was performed with the administration of intravenous contrast. Multiplanar reformatted images are provided for review. MIP images are provided for review. Dose modulation, iterative reconstruction, and/or weight based adjustment of the mA/kV was utilized to reduce the radiation dose to as low as reasonably achievable.; CTA of the neck was performed with the administration of intravenous contrast. Multiplanar reformatted images are provided for review. MIP images are provided for review. Stenosis of the internal carotid arteries measured using NASCET criteria. Dose modulation, iterative reconstruction, and/or weight based adjustment of the mA/kV was utilized to reduce the radiation dose to as low as reasonably achievable. COMPARISON: None HISTORY: ORDERING SYSTEM PROVIDED HISTORY: STROKE TECHNOLOGIST PROVIDED HISTORY: FINDINGS: CTA NECK: AORTIC ARCH/ARCH VESSELS: There is a normal branch pattern of the aortic arch. No significant stenosis is seen of the innominate artery or subclavian arteries. CAROTID ARTERIES: The common carotid arteries are normal in appearance without evidence of a flow limiting stenosis. The internal carotid arteries are normal in appearance without evidence of a flow limiting stenosis by NASCET criteria. No dissection or arterial injury is seen.  VERTEBRAL ARTERIES: Dominant left vertebral artery nondominant diminutive right vertebral artery appear patent at the infarction. No acute arterial abnormality reliably identified. The distal left posterior inferior cerebellar artery is not well visualized but this may be related to limited resolution of CTA technique. Mri Brain Wo Contrast    Result Date: 9/21/2018  EXAMINATION: MRI OF THE BRAIN WITHOUT CONTRAST  9/21/2018 11:19 am TECHNIQUE: Multiplanar multisequence MRI of the brain was performed without the administration of intravenous contrast. COMPARISON: Noncontrast CT head 09/20/2018, MRI brain 09/16/2018 HISTORY: ORDERING SYSTEM PROVIDED HISTORY: STROKE Ongoing evaluation of acute symptoms FINDINGS: INTRACRANIAL STRUCTURES/VENTRICLES: Ventricles and sulci are normal in size. There is no midline shift or hydrocephalus. Basal cisterns are patent. There is redemonstration of an evolving, subacute left inferior cerebellar hemisphere infarct. Associated mass effect has mildly improved since the prior MRI. There is persistent, but improved partial effacement of the 4th ventricle. No extra-axial fluid collection. No new, acute infarct. There is no abnormal parenchymal gradient susceptibility. Flow voids of the proximal intracranial arteries and dural sinuses are unremarkable. ORBITS: The visualized portion of the orbits demonstrate no acute abnormality. SINUSES: No fluid is seen in the visualized paranasal sinuses and mastoid air cells. BONES/SOFT TISSUES: The bone marrow signal intensity appears normal. The craniocervical junction is normal in appearance. Expected evolution of a subacute left inferior cerebellar hemisphere infarct. There is persistent, but improved partial effacement of the 4th ventricle. Mri Brain Wo Contrast    Result Date: 9/16/2018  EXAMINATION: MRI OF THE BRAIN WITHOUT CONTRAST  9/16/2018 3:47 am TECHNIQUE: Multiplanar multisequence MRI of the brain was performed without the administration of intravenous contrast. COMPARISON: None.  HISTORY: ORDERING SYSTEM PROVIDED HISTORY: STROKE before leaving   - IVF     Nausea, abodminal pain - GI consulted   GERD - protonix. Dietitian consult. Elevated LFT with hep c ab positive - pt denies any h/o hepatitis. States has been negative. FU OP       H/o drug abuse:   - cocaine and opiates positive in 2015.   - Reports that last use about a month ago.   - UDS negative this admission        Bandar Dorman MD  9/22/2018  4:02 PM

## 2018-09-22 NOTE — PROGRESS NOTES
Occupational Therapy   Occupational Therapy Initial Assessment  Date: 2018   Patient Name: Micah Calabrese  MRN: [id number]     : 1980    Date of Service: 2018    Discharge Recommendations:  Outpatient OT  OT Equipment Recommendations  Other: Recommend full visual assessment, possible functional capacity evaluation regarding returning to work as pt is self employed . Patient Diagnosis(es): The primary encounter diagnosis was Dizziness. Diagnoses of Bradycardia, Dehydration, and Non-intractable vomiting with nausea, unspecified vomiting type were also pertinent to this visit. has a past medical history of Anxiety; Blind right eye; Cerebellar stroke ([facility] [address] Drug use; GERD (gastroesophageal reflux disease); Hepatitis C; Lung collapse; Pneumonia; and Psychiatric problem. has a past surgical history that includes lobectomy (Left); Endoscopy, colon, diagnostic; Colonoscopy; and eye surgery (Right).            Restrictions  Restrictions/Precautions  Restrictions/Precautions: Fall Risk  Position Activity Restriction  Other position/activity restrictions: dizziness    Subjective   General  Chart Reviewed: Yes  Patient assessed for rehabilitation services?: Yes  Family / Caregiver Present: No  Pain Assessment  Patient Currently in Pain: Denies  Pain Assessment: 0-10  Pain Level: 4  Pain Type: Acute pain  Pain Location: Abdomen, Head  Pain Orientation: Lower  Pain Descriptors: Aching, Headache  Pain Frequency: Intermittent  Clinical Progression: Not changed  Pain Intervention(s): Medication (see eMar), Emotional support  Response to Pain Intervention: Patient Satisfied  RASS Score (Ventilated): Alert and calm     Social/Functional History  Social/Functional History  Lives With: Family  Type of Home: House (stays in basement)  Home Layout: Two level  Home Access: Stairs to enter with rails  Entrance Stairs - Number of Steps: 2  Entrance Stairs - Rails: Both  Bathroom Shower/Tub: Tub/Shower Cognitive Status: WNL  Perception  Overall Perceptual Status: Impaired (Recommend perceptual testing after visual assessment on outpt basis)     Sensation  Overall Sensation Status: Impaired (tingling left UE nad finger tips, as well as left foot and toes)        LUE AROM (degrees)  LUE AROM : WNL  Left Hand AROM (degrees)  Left Hand AROM: WNL  RUE AROM (degrees)  RUE AROM : WNL  Right Hand AROM (degrees)  Right Hand AROM: WNL  LUE Strength  Gross LUE Strength: WNL  LUE Strength Comment: slight decrease left hand intrinsics  RUE Strength  Gross RUE Strength: WNL                  Assessment   Performance deficits / Impairments: Decreased functional mobility ; Decreased ADL status; Decreased endurance;Decreased sensation;Decreased coordination;Decreased balance;Decreased vision/visual deficit; Decreased fine motor control  Assessment: 35 y/o male s/p recent right cerebellar CVA with deficits of dizziness, decreased left UE coordination, decreased sensation left hemibody, and visual changes affecting ADL independence and mobility. Prognosis: Good  Decision Making: Medium Complexity  REQUIRES OT FOLLOW UP: Yes  Activity Tolerance  Activity Tolerance: Patient Tolerated treatment well  Activity Tolerance: Fair, c/o dizziness with any mobility. Safety Devices  Safety Devices in place: Yes  Type of devices: All fall risk precautions in place; Bed alarm in place;Call light within reach         Plan   Plan  Times per week: 5-6  Times per day: Daily  Plan weeks: 2 weeks  Specific instructions for Next Treatment: AD for ADL. Safety with mobility for ADL due to dizziness and decreased depth perception.   Current Treatment Recommendations: Strengthening, Balance Training, Functional Mobility Training, Endurance Training, Neuromuscular Re-education, Safety Education & Training, Patient/Caregiver Education & Training, Self-Care / ADL    G-Code  OT G-codes  Functional Assessment Tool Used: AM PAC ADL  Score: 22  Functional Limitation: Self care  Self Care Current Status (): At least 20 percent but less than 40 percent impaired, limited or restricted  Self Care Goal Status (): At least 1 percent but less than 20 percent impaired, limited or restricted  OutComes Score                                           AM-PAC Score        AM-PAC Inpatient Daily Activity Raw Score: 22  AM-PAC Inpatient ADL T-Scale Score : 47.1  ADL Inpatient CMS 0-100% Score: 25.8  ADL Inpatient CMS G-Code Modifier : CJ    Goals  Short term goals  Time Frame for Short term goals: Until d/c from facility. Short term goal 1: Pt to be Independent with dressing . Short term goal 2: Pt to be modified independent with bathing, AD as needed and increased time for safety. Short term goal 3: Pt to be Modified independent with mobility for ADL with AD as needed. Short term goal 4: Pt to demo increased safety awareness regarding balance deficits with ADL   Long term goals  Time Frame for Long term goals : 4 weeks  Long term goal 1: Pt to follow up with full visual assessment after d/c home. Long term goal 2: Pt to inquire about return to work, possible Functional Capacity Evaluation before return to work. Patient Goals   Patient goals : Improve balance and dizziness with mobility.        Therapy Time   Individual Concurrent Group Co-treatment   Time In [facility]         Time Out 0905         Minutes [address], [place]

## 2018-09-23 PROBLEM — R11.2 NAUSEA AND VOMITING: Status: ACTIVE | Noted: 2018-09-23

## 2018-09-23 PROBLEM — R10.9 ABDOMINAL PAIN: Status: ACTIVE | Noted: 2018-09-23

## 2018-09-23 LAB
AFP: 4 UG/L
ALPHA-1 ANTITRYPSIN: 89 MG/DL (ref 90–200)
CERULOPLASMIN: 14 MG/DL (ref 15–30)
GGT: 25 U/L (ref 8–61)
HBV SURFACE AB TITR SER: <3.5 MIU/ML
HEPATITIS B CORE TOTAL ANTIBODY: NONREACTIVE
HEPATITIS B SURFACE ANTIGEN: NONREACTIVE

## 2018-09-23 PROCEDURE — 2060000000 HC ICU INTERMEDIATE R&B

## 2018-09-23 PROCEDURE — 2580000003 HC RX 258: Performed by: NURSE PRACTITIONER

## 2018-09-23 PROCEDURE — 86038 ANTINUCLEAR ANTIBODIES: CPT

## 2018-09-23 PROCEDURE — 86317 IMMUNOASSAY INFECTIOUS AGENT: CPT

## 2018-09-23 PROCEDURE — 86644 CMV ANTIBODY: CPT

## 2018-09-23 PROCEDURE — 82103 ALPHA-1-ANTITRYPSIN TOTAL: CPT

## 2018-09-23 PROCEDURE — 86255 FLUORESCENT ANTIBODY SCREEN: CPT

## 2018-09-23 PROCEDURE — 6360000002 HC RX W HCPCS

## 2018-09-23 PROCEDURE — 6360000002 HC RX W HCPCS: Performed by: NURSE PRACTITIONER

## 2018-09-23 PROCEDURE — C9113 INJ PANTOPRAZOLE SODIUM, VIA: HCPCS | Performed by: NURSE PRACTITIONER

## 2018-09-23 PROCEDURE — 82977 ASSAY OF GGT: CPT

## 2018-09-23 PROCEDURE — 86663 EPSTEIN-BARR ANTIBODY: CPT

## 2018-09-23 PROCEDURE — 86664 EPSTEIN-BARR NUCLEAR ANTIGEN: CPT

## 2018-09-23 PROCEDURE — 86665 EPSTEIN-BARR CAPSID VCA: CPT

## 2018-09-23 PROCEDURE — 6370000000 HC RX 637 (ALT 250 FOR IP): Performed by: FAMILY MEDICINE

## 2018-09-23 PROCEDURE — 87902 NFCT AGT GNTYP ALYS HEP C: CPT

## 2018-09-23 PROCEDURE — 82390 ASSAY OF CERULOPLASMIN: CPT

## 2018-09-23 PROCEDURE — 36415 COLL VENOUS BLD VENIPUNCTURE: CPT

## 2018-09-23 PROCEDURE — 6370000000 HC RX 637 (ALT 250 FOR IP): Performed by: INTERNAL MEDICINE

## 2018-09-23 PROCEDURE — 86704 HEP B CORE ANTIBODY TOTAL: CPT

## 2018-09-23 PROCEDURE — 99232 SBSQ HOSP IP/OBS MODERATE 35: CPT | Performed by: PSYCHIATRY & NEUROLOGY

## 2018-09-23 PROCEDURE — 99254 IP/OBS CNSLTJ NEW/EST MOD 60: CPT | Performed by: INTERNAL MEDICINE

## 2018-09-23 PROCEDURE — 87340 HEPATITIS B SURFACE AG IA: CPT

## 2018-09-23 PROCEDURE — 83516 IMMUNOASSAY NONANTIBODY: CPT

## 2018-09-23 PROCEDURE — 99232 SBSQ HOSP IP/OBS MODERATE 35: CPT | Performed by: FAMILY MEDICINE

## 2018-09-23 PROCEDURE — 82105 ALPHA-FETOPROTEIN SERUM: CPT

## 2018-09-23 PROCEDURE — 86645 CMV ANTIBODY IGM: CPT

## 2018-09-23 PROCEDURE — 2580000003 HC RX 258: Performed by: FAMILY MEDICINE

## 2018-09-23 RX ORDER — 0.9 % SODIUM CHLORIDE 0.9 %
10 VIAL (ML) INJECTION DAILY
Status: DISCONTINUED | OUTPATIENT
Start: 2018-09-23 | End: 2018-09-24 | Stop reason: HOSPADM

## 2018-09-23 RX ORDER — PANTOPRAZOLE SODIUM 40 MG/10ML
40 INJECTION, POWDER, LYOPHILIZED, FOR SOLUTION INTRAVENOUS DAILY
Status: DISCONTINUED | OUTPATIENT
Start: 2018-09-23 | End: 2018-09-24

## 2018-09-23 RX ORDER — POLYETHYLENE GLYCOL 3350 17 G/17G
17 POWDER, FOR SOLUTION ORAL DAILY
Status: DISCONTINUED | OUTPATIENT
Start: 2018-09-23 | End: 2018-09-24 | Stop reason: HOSPADM

## 2018-09-23 RX ADMIN — SODIUM CHLORIDE: 9 INJECTION, SOLUTION INTRAVENOUS at 06:51

## 2018-09-23 RX ADMIN — ASPIRIN 81 MG: 81 TABLET, COATED ORAL at 09:00

## 2018-09-23 RX ADMIN — POLYETHYLENE GLYCOL 3350 17 G: 17 POWDER, FOR SOLUTION ORAL at 17:29

## 2018-09-23 RX ADMIN — PANTOPRAZOLE SODIUM 40 MG: 40 TABLET, DELAYED RELEASE ORAL at 09:00

## 2018-09-23 RX ADMIN — FOLIC ACID 1 MG: 1 TABLET ORAL at 09:00

## 2018-09-23 RX ADMIN — PANTOPRAZOLE SODIUM 40 MG: 40 INJECTION, POWDER, FOR SOLUTION INTRAVENOUS at 18:06

## 2018-09-23 RX ADMIN — ONDANSETRON 4 MG: 2 INJECTION INTRAMUSCULAR; INTRAVENOUS at 02:01

## 2018-09-23 RX ADMIN — ONDANSETRON 4 MG: 2 INJECTION INTRAMUSCULAR; INTRAVENOUS at 09:13

## 2018-09-23 RX ADMIN — ONDANSETRON 4 MG: 2 INJECTION INTRAMUSCULAR; INTRAVENOUS at 14:53

## 2018-09-23 RX ADMIN — PROMETHAZINE HYDROCHLORIDE 12.5 MG: 25 INJECTION, SOLUTION INTRAMUSCULAR; INTRAVENOUS at 16:47

## 2018-09-23 RX ADMIN — ATORVASTATIN CALCIUM 40 MG: 40 TABLET, FILM COATED ORAL at 20:51

## 2018-09-23 RX ADMIN — PROMETHAZINE HYDROCHLORIDE 12.5 MG: 25 INJECTION, SOLUTION INTRAMUSCULAR; INTRAVENOUS at 11:02

## 2018-09-23 RX ADMIN — DOCUSATE SODIUM 100 MG: 100 CAPSULE, LIQUID FILLED ORAL at 09:00

## 2018-09-23 RX ADMIN — PROMETHAZINE HYDROCHLORIDE 12.5 MG: 25 INJECTION, SOLUTION INTRAMUSCULAR; INTRAVENOUS at 00:17

## 2018-09-23 RX ADMIN — Medication 10 ML: at 18:06

## 2018-09-23 RX ADMIN — DOCUSATE SODIUM 100 MG: 100 CAPSULE, LIQUID FILLED ORAL at 20:51

## 2018-09-23 RX ADMIN — ONDANSETRON 4 MG: 2 INJECTION INTRAMUSCULAR; INTRAVENOUS at 20:54

## 2018-09-23 NOTE — CONSULTS
[address] CONSULT    Patient:   Maverick Kramer   :    1980   Facility:   [facility]  Date:    2018  Ad[facility] Dx:  [facility] [I[facility]]  Requesting physician: Owen Purvis MD  Reason for consult:  Nausea, vomiting abdominal pain      SUBJECTIVE:  History of Present Illness: This is a 34 y.o.   male who was admitted 2018 with Cerebellar stroke ([facility] [address] [I63.9]. We have been asked to see the patient in consultation by Owen Purvis MD for  Nausea, vomiting and abdominal pain. The pt recently had a left cerebellar stroke that did not require surgical intervention. He states that since his stroke he has had nausea with multiple episodes of brown emesis  and diffuse abdominal pain. He feels that his food is \"coming up\" after he eats. His pain does not radiate to his back and is not accompanied by fevers. He reports no bowel movement in one week. He was offered a dulcolax suppository yesterday which he refused. He did have a ct of the abdomen that showed a small hiatal hernia with mild sigmoid colon thickness. The pt denies having a previous EGD or colonoscopy. He has taken over the counter acid reflux medication. He denies a family history of colon cancer. He denies illicit drug use but did test positive for cocaine. He admits to drinking 4-5 beers per week and smoking cigarettes. Evan Ball He is also hepatitis C antibody reactive; his LFT's were mildly elevated on 18.      OBJECTIVE:    PAST MEDICAL/SURGICAL HISTORY  Past Medical History:   Diagnosis Date    Anxiety     Blind right eye     Cerebellar stroke ([facility] [address] 2018    Drug use     cocaine    GERD (gastroesophageal reflux disease)     Hepatitis C     Lung collapse     Pneumonia     Psychiatric problem      Past Surgical History:   Procedure Laterality Date    COLONOSCOPY      ENDOSCOPY, COLON, DIAGNOSTIC      EYE SURGERY Right     approximately     LOBECTOMY Left     lower lobe       ALLERGIES:  No Known Allergies    HOME MEDICATIONS:  Prior to Admission medications    Medication Sig Start Date End Date Taking? Authorizing Provider   acetaminophen (TYLENOL) 325 MG tablet Take 325 mg by mouth daily as needed for Pain   Yes Historical Provider, MD   calcium carbonate (TUMS) 500 MG chewable tablet Take 1 tablet by mouth daily as needed for Heartburn   Yes Historical Provider, MD   bisacodyl (DULCOLAX) 5 MG EC tablet Take 5 mg by mouth daily as needed for Constipation   Yes Historical Provider, MD   aspirin 81 MG EC tablet Take 1 tablet by mouth daily 9/18/18   Federico Mensah DO   atorvastatin (LIPITOR) 40 MG tablet Take 1 tablet by mouth nightly 9/18/18   Federico Mensah DO   famotidine (PEPCID) 20 MG tablet Take 1 tablet by mouth 2 times daily 9/18/18   Federico Mensah DO   folic acid (FOLVITE) 1 MG tablet Take 1 tablet by mouth daily 9/18/18   Omkar Griffin DO       CURRENT MEDICATIONS:  Scheduled Meds:   polyethylene glycol  17 g Oral Daily    docusate sodium  100 mg Oral BID    pantoprazole  40 mg Oral QAM AC    folic acid  1 mg Oral Daily    atorvastatin  40 mg Oral Nightly    aspirin  81 mg Oral Daily    sodium chloride flush  10 mL Intravenous 2 times per day     Continuous Infusions:   sodium chloride 100 mL/hr at 09/23/18 0651    dextrose       PRN Meds:ondansetron, calcium carbonate, sodium chloride flush, potassium chloride **OR** potassium chloride **OR** potassium chloride, magnesium sulfate, magnesium hydroxide, bisacodyl, nicotine, acetaminophen, glucose, dextrose, glucagon (rDNA), dextrose, chlorproMAZINE, promethazine    SOCIAL HISTORY:     Tobacco:   reports that he has been smoking Cigarettes. He has a 20.00 pack-year smoking history. He has quit using smokeless tobacco. His smokeless tobacco use included Chew. Alcohol:   reports that he drinks alcohol.   Illicit drugs:  reports that he uses drugs, including Cocaine. FAMILY HISTORY:     History reviewed. No pertinent family history. REVIEW OF SYSTEMS:    Constitutional: No fever, no chills, no lethargy, no weakness. HEENT:  No headache, otalgia, itchy eyes, nasal discharge or sore throat. Cardiac:  No chest pain, dyspnea, orthopnea or PND. Chest:   No cough, phlegm or wheezing. Abdomen:  See gi hpi  Neuro:  No focal weakness, abnormal movements or seizure like activity. reports dizziness  Skin:   No rashes, no itching. :   No hematuria, no pyuria, no dysuria, no flank pain. Extremities:  No swelling or joint pains. ROS was otherwise negative except as mentioned in the [address] PHYSICAL EXAM:    /65   Pulse 67   Temp 97.9 °F (36.6 °C) (Oral)   Resp 18   Ht 6' (1.829 m)   Wt 194 lb 1.6 oz (88 kg)   SpO2 96%   BMI 26.32 kg/m²     GENERAL:   Well developed, Well nourished, No apparent distress  HEAD:   Normocephalic, Atraumatic  EENT:   EOMI, Sclera not icteric, Oropharynx moist  NECK:   Supple, Trachea midline  LUNGS:  CTA Bilaterally  HEART:  RRR, No murmur  ABDOMEN:   Soft, Nontender, Nondistended, BS WNL  EXT:   No clubbing. No cyanosis. No edema. SKIN:   No rashes. No jaundice. No stigmata of liver disease. MUSC/SKEL:   Adequate muscle bulk for patient's age, No significant synovitis, No deformities  NEURO:  A&O x Three, CN II- XII grossly intact    LABS AND IMAGING:    CBC  Recent Labs      09/21/18   0543   WBC  10.4   HGB  15.0   HCT  45.6   MCV  91.8   PLT  213       BMP  Recent Labs      09/21/18   0543  09/22/18   0742   NA  142  142   K  3.9  4.4   CL  105  109*   CO2  25  26   BUN  16  15   CREATININE  0.87  0.86   GLUCOSE  193*  137*   CALCIUM  9.0  8.7       LFTS  Recent Labs      09/21/18   0543   ALKPHOS  53   ALT  85*   AST  42*   PROT  6.3*   BILITOT  0.91   BILIDIR  0.24   LABALBU  4.0       AMYLASE/LIPASE/AMMONIA  No results for input(s): AMYLASE, LIPASE, AMMONIA in the last 72 hours.     PT/INR  Recent Labs

## 2018-09-23 NOTE — FLOWSHEET NOTE
Patient shares about his massive stroke, and that he is doing well. Patient shares his worries and fears. Patient states good support from parent.  shared in listening , presence. Follow up as needed. 09/23/18 1616   Encounter Summary   Services provided to: Patient   Referral/Consult From: [facility] Parent   Continue Visiting (3-36-28)   Complexity of Encounter Low   Length of Encounter 15 minutes   Spiritual Assessment Completed Yes   Routine   Type Initial   Assessment Calm; Approachable   Intervention Active listening;Explored feelings, thoughts, concerns;Sustaining presence/ Ministry of presence; Discussed illness/injury and it's impact; Discussed belief system/Oriental orthodox practices/james   Outcome Expressed gratitude;Receptive

## 2018-09-23 NOTE — PLAN OF CARE
[facility]    Second Visit Note  For more detailed information please refer to the progress note of the day      9/23/2018    5:53 PM    Name:   Pam Alvarado  MRN:     [id number]     Acct:      [de-identified]   Room:   1019/1019-02   Day:  3  Admit Date:  9/20/2018 12:46 PM    PCP:   No primary care provider on file. Code Status:  Full Code      Pt vitals were reviewed   New labs were reviewed   Patient was seen    Patient states he continues to have severe pain, n/v.   Patient also states his abdomen is getting tense today. Hiccup is somewhat better today. GI planning for EGD tomorrow     Updated plan :   1. EGD tomorrow   2.  Need holter monitor placed before discharge          Janel Carson MD  9/23/2018  5:53 PM

## 2018-09-23 NOTE — PROGRESS NOTES
(improving)     Medications: Allergies:  No Known Allergies    Current Meds:   Scheduled Meds:    docusate sodium  100 mg Oral BID    pantoprazole  40 mg Oral QAM AC    folic acid  1 mg Oral Daily    atorvastatin  40 mg Oral Nightly    aspirin  81 mg Oral Daily    sodium chloride flush  10 mL Intravenous 2 times per day     Continuous Infusions:    sodium chloride 100 mL/hr at 18 0651    dextrose       PRN Meds: ondansetron, calcium carbonate, sodium chloride flush, potassium chloride **OR** potassium chloride **OR** potassium chloride, magnesium sulfate, magnesium hydroxide, bisacodyl, nicotine, acetaminophen, glucose, dextrose, glucagon (rDNA), dextrose, chlorproMAZINE, promethazine    Data:     Past Medical History:   has a past medical history of Anxiety; Blind right eye; Cerebellar stroke ([address] Drug use; GERD (gastroesophageal reflux disease); Hepatitis C; Lung collapse; Pneumonia; and Psychiatric problem. Social History:   reports that he has been smoking Cigarettes. He has a 20.00 pack-year smoking history. He has quit using smokeless tobacco. His smokeless tobacco use included Chew. He reports that he drinks alcohol. He reports that he uses drugs, including Cocaine. Family History: History reviewed. No pertinent family history. Vitals:  /75   Pulse 61   Temp 97.7 °F (36.5 °C) (Oral)   Resp 16   Ht 6' (1.829 m)   Wt 194 lb 1.6 oz (88 kg)   SpO2 99%   BMI 26.32 kg/m²   Temp (24hrs), Av °F (36.7 °C), Min:97.7 °F (36.5 °C), Max:98.4 °F (36.9 °C)    Recent Labs      18   2041  18   0811  18   1219  18   1613   POCGLU  148*  119*  115*  149*       I/O (24Hr):     Intake/Output Summary (Last 24 hours) at 18 1122  Last data filed at 18 0654   Gross per 24 hour   Intake             3848 ml   Output              500 ml   Net             3348 ml       Labs:    Hematology:  Recent Labs      18   1300  18   0543   WBC   -- NOT REPORTED 10/30/2015    PBEA 4.7 10/30/2015    C7CKLMDW 99.0 10/30/2015    FIO2 CANNULA 10/30/2015       Radiology:    Ct Abdomen Pelvis Wo Contrast    Result Date: 9/20/2018  EXAMINATION: CT OF THE ABDOMEN AND PELVIS WITHOUT CONTRAST 9/20/2018 3:07 pm TECHNIQUE: CT of the abdomen and pelvis was performed without the administration of intravenous contrast. Multiplanar reformatted images are provided for review. Dose modulation, iterative reconstruction, and/or weight based adjustment of the mA/kV was utilized to reduce the radiation dose to as low as reasonably achievable. COMPARISON: 10/30/2015 HISTORY: ORDERING SYSTEM PROVIDED HISTORY: Abdominal pain FINDINGS: Lower Chest: Visualized lung bases are clear without focal airspace consolidation, sizeable effusion, or pneumothorax. No definite pulmonary nodules or masses. Base of the heart is normal in size without pericardial fluid collection. Organs: The liver, gallbladder, pancreas, and spleen are grossly within normal limits. No evidence for intrahepatic or extrahepatic biliary ductal dilatation. GI/Bowel: There is no evidence for bowel obstruction or inflammation. No free intraperitoneal air or fluid. Nonspecific generalized wall thickening of the sigmoid colon. Small bowel loops are normal in caliber. Small hiatal hernia. Appendix is unremarkable. Pelvis: No evidence for free fluid. Peritoneum/Retroperitoneum: Adrenal glands are normal in size and configuration. The kidneys are normal in size without definite nephrolithiasis or hydronephrosis. The ureters run a nonobstructed course to a normal-appearing urinary bladder. Vasculature: The aorta is normal in caliber. No definite lymphadenopathy identified. Bones/Soft Tissues: No evidence for acute fracture or dislocation. No lytic or blastic lesions. No evidence for acute intra-abdominal or intrapelvic pathology. No bowel obstruction or inflammation.   No evidence for nephrolithiasis or urinary obstruction. Small hiatal hernia. Nonspecific generalized wall thickening of the sigmoid colon, at least partially due to underdistention. Ct Head Wo Contrast    Result Date: 9/20/2018  EXAMINATION: CT OF THE HEAD WITHOUT CONTRAST  9/17/2018 11:52 am TECHNIQUE: CT of the head was performed without the administration of intravenous contrast. Dose modulation, iterative reconstruction, and/or weight based adjustment of the mA/kV was utilized to reduce the radiation dose to as low as reasonably achievable. COMPARISON: CT head 09/15/2018 HISTORY: ORDERING SYSTEM PROVIDED HISTORY: EVALUATE CEREBELLAR STROKE TECHNOLOGIST PROVIDED HISTORY: Initial evaluation. FINDINGS: BRAIN/VENTRICLES: There is a wedge-shaped area of hypoattenuation in the left cerebellum consistent with the known left cerebellar infarct. The ventricles and cisternal spaces are normal in size, shape, and configuration for the age of the patient. No other areas of abnormal attenuation are identified. There is no midline shift or mass effect. No hemorrhage is identified in the brain parenchyma. ORBITS: The visualized portion of the orbits demonstrate no acute abnormality. SINUSES:  The visualized paranasal sinuses and mastoid air cells are clear. SOFT TISSUES/SKULL:  No acute abnormality of the visualized skull or soft tissues. Stable appearance to a left cerebellar infarct. No new or acute finding. Ct Head Wo Contrast    Result Date: 9/20/2018  EXAMINATION: CT OF THE HEAD WITHOUT CONTRAST  9/20/2018 1:24 pm TECHNIQUE: CT of the head was performed without the administration of intravenous contrast. Dose modulation, iterative reconstruction, and/or weight based adjustment of the mA/kV was utilized to reduce the radiation dose to as low as reasonably achievable.  COMPARISON: 09/18/2018 HISTORY: ORDERING SYSTEM PROVIDED HISTORY: History of stroke nausea vomiting and constipation TECHNOLOGIST PROVIDED HISTORY: FINDINGS: Motion and streak artifact. BRAIN/VENTRICLES: Infarct in evolution within the left inferior cerebellum noted, obscured by streak and motion artifact. There is no evidence for hemorrhagic conversion. No obvious intracranial hemorrhage, mass effect, or midline shift. ORBITS: The visualized portion of the orbits demonstrate no acute abnormality. SINUSES: The visualized paranasal sinuses and mastoid air cells demonstrate no acute abnormality. SOFT TISSUES/SKULL:  No acute abnormality of the visualized skull or soft tissues. Left inferior cerebellar infarct in evolution without evidence for hemorrhagic transformation. No intracranial hemorrhage, mass effect, or midline shift. Ct Head Wo Contrast    Result Date: 9/18/2018  EXAMINATION: CT OF THE HEAD WITHOUT CONTRAST  9/18/2018 9:07 am TECHNIQUE: CT of the head was performed without the administration of intravenous contrast. Dose modulation, iterative reconstruction, and/or weight based adjustment of the mA/kV was utilized to reduce the radiation dose to as low as reasonably achievable. COMPARISON: 09/17/2018, MRI brain 09/16/2018. HISTORY: ORDERING SYSTEM PROVIDED HISTORY: Evaluate cerebellar stroke. TECHNOLOGIST PROVIDED HISTORY: FINDINGS: BRAIN/VENTRICLES: Continued evolution of subacute left inferior cerebellar infarction in the posterior inferior cerebellar artery territory. There is parenchymal edema with mass effect and partial effacement of the 4th ventricle, similar to prior study. No evidence of hydrocephalus. No hemorrhagic transformation. Remainder of the gray-white matter differentiation is maintained. No new intracranial hemorrhage. No new acute infarctions. The basal cisterns are patent. ORBITS: The visualized portion of the orbits demonstrate no acute abnormality. SINUSES: The visualized paranasal sinuses and mastoid air cells demonstrate no acute abnormality. SOFT TISSUES/SKULL:  No acute abnormality of the visualized skull or soft tissues.      Continued evolution of subacute left inferior cerebellar infarction. No hemorrhagic transformation or obstructive hydrocephalus. Ct Head Wo Contrast    Result Date: 9/17/2018  EXAMINATION: CT OF THE HEAD WITHOUT CONTRAST  9/15/2018 11:09 pm TECHNIQUE: CT of the head was performed without the administration of intravenous contrast. Dose modulation, iterative reconstruction, and/or weight based adjustment of the mA/kV was utilized to reduce the radiation dose to as low as reasonably achievable. COMPARISON: None HISTORY: ORDERING SYSTEM PROVIDED HISTORY: HEADACHE TECHNOLOGIST PROVIDED HISTORY: FINDINGS: BRAIN/VENTRICLES: Left cerebellar hemisphere hypoattenuation with regional mass effect partially compressing the 4th ventricle. Hypoattenuation within the left renate and medulla noted. No hydrocephalus. Supratentorially, gray-white matter differentiation is preserved. No acute intracranial hemorrhage. ORBITS: The visualized portion of the orbits demonstrate no acute abnormality. SINUSES: The visualized paranasal sinuses and mastoid air cells demonstrate no acute abnormality. SOFT TISSUES/SKULL:  No acute abnormality of the visualized skull or soft tissues. Left cerebellar hemisphere acute to subacute infarction, within the left posterior inferior cerebellar artery territory. Consider further characterization by MRI. Critical results were called by Dr. Rosetta Braxton to Pamela Watson on 9/15/2018 at 23:32. Xr Chest Portable    Result Date: 9/16/2018  EXAMINATION: SINGLE XRAY VIEW OF THE CHEST 9/16/2018 5:47 am COMPARISON: October 31, 2015 HISTORY: ORDERING SYSTEM PROVIDED HISTORY: stroke TECHNOLOGIST PROVIDED HISTORY: stroke FINDINGS: Surgical clips overlie the left lung apex. Cardiac monitoring leads overlie the chest.  Stable cardiomediastinal contours. No pleural effusion, pneumothorax, or focal airspace consolidation is apparent. No free subdiaphragmatic air. The osseous structures appear intact.      No acute findings. Cta Neck W Contrast    Result Date: 9/17/2018  EXAMINATION: CTA OF THE HEAD WITH CONTRAST; CTA OF THE NECK 9/16/2018 2:54 am: TECHNIQUE: CTA of the head/brain was performed with the administration of intravenous contrast. Multiplanar reformatted images are provided for review. MIP images are provided for review. Dose modulation, iterative reconstruction, and/or weight based adjustment of the mA/kV was utilized to reduce the radiation dose to as low as reasonably achievable.; CTA of the neck was performed with the administration of intravenous contrast. Multiplanar reformatted images are provided for review. MIP images are provided for review. Stenosis of the internal carotid arteries measured using NASCET criteria. Dose modulation, iterative reconstruction, and/or weight based adjustment of the mA/kV was utilized to reduce the radiation dose to as low as reasonably achievable. COMPARISON: None HISTORY: ORDERING SYSTEM PROVIDED HISTORY: STROKE TECHNOLOGIST PROVIDED HISTORY: FINDINGS: CTA NECK: AORTIC ARCH/ARCH VESSELS: There is a normal branch pattern of the aortic arch. No significant stenosis is seen of the innominate artery or subclavian arteries. CAROTID ARTERIES: The common carotid arteries are normal in appearance without evidence of a flow limiting stenosis. The internal carotid arteries are normal in appearance without evidence of a flow limiting stenosis by NASCET criteria. No dissection or arterial injury is seen. VERTEBRAL ARTERIES: Dominant left vertebral artery nondominant diminutive right vertebral artery appear patent at the cervical level. SOFT TISSUES: The lung apices are clear. No cervical or superior mediastinal lymphadenopathy. The visualized portion of the larynx and pharynx appear unremarkable. The parotid, submandibular and thyroid glands demonstrate no acute abnormality. BONES: The visualized osseous structures appear unremarkable.  CTA HEAD: ANTERIOR CIRCULATION: The internal carotid arteries are normal in course and caliber without focal stenosis. The anterior cerebral and middle cerebral arteries demonstrate no focal stenosis. POSTERIOR CIRCULATION: Distal right vertebral artery is markedly thinned. Origins of the posterior inferior cerebellar arteries are identified and appear patent. Distal left posterior inferior cerebellar artery is not well visualized, possibly due to technical limitations. Basilar artery is patent. Posterior cerebral arteries are patent. BRAIN: Hypodensity within the left cerebellar hemisphere with mild mass effect on the 4th ventricle. No hydrocephalus. Left cerebellar hemisphere edema compatible with acute to subacute infarction. No acute arterial abnormality reliably identified. The distal left posterior inferior cerebellar artery is not well visualized but this may be related to limited resolution of CTA technique. Cta Head W Contrast    Result Date: 9/17/2018  EXAMINATION: CTA OF THE HEAD WITH CONTRAST; CTA OF THE NECK 9/16/2018 2:54 am: TECHNIQUE: CTA of the head/brain was performed with the administration of intravenous contrast. Multiplanar reformatted images are provided for review. MIP images are provided for review. Dose modulation, iterative reconstruction, and/or weight based adjustment of the mA/kV was utilized to reduce the radiation dose to as low as reasonably achievable.; CTA of the neck was performed with the administration of intravenous contrast. Multiplanar reformatted images are provided for review. MIP images are provided for review. Stenosis of the internal carotid arteries measured using NASCET criteria. Dose modulation, iterative reconstruction, and/or weight based adjustment of the mA/kV was utilized to reduce the radiation dose to as low as reasonably achievable.  COMPARISON: None HISTORY: ORDERING SYSTEM PROVIDED HISTORY: STROKE TECHNOLOGIST PROVIDED HISTORY: FINDINGS: CTA NECK: AORTIC ARCH/ARCH VESSELS: There

## 2018-09-23 NOTE — PROGRESS NOTES
2216    atorvastatin (LIPITOR) tablet 40 mg  40 mg Oral Nightly Socorro Nugent MD   40 mg at 09/22/18 2216    aspirin EC tablet 81 mg  81 mg Oral Daily Socorro Nugent MD   81 mg at 09/23/18 0900    sodium chloride flush 0.9 % injection 10 mL  10 mL Intravenous 2 times per day Socorro Nugent MD   10 mL at 09/21/18 2058    sodium chloride flush 0.9 % injection 10 mL  10 mL Intravenous PRN Socorro Nugent MD        potassium chloride (KLOR-CON M) extended release tablet 40 mEq  40 mEq Oral PRN Socorro Nugent MD        Or   [facility] potassium chloride 20 MEQ/15ML (10%) oral solution 40 mEq  40 mEq Oral PRN Socorro Nugent MD        Or   [facility] potassium chloride 10 mEq/100 mL IVPB (Peripheral Line)  10 mEq Intravenous PRN Socorro Nugent MD        magnesium sulfate 1 g in dextrose 5% 100 mL IVPB  1 g Intravenous PRN Socorro Nugent MD        magnesium hydroxide (MILK OF MAGNESIA) 400 MG/5ML suspension 30 mL  30 mL Oral Daily PRN Socorro Nugent MD        bisacodyl (DULCOLAX) suppository 10 mg  10 mg Rectal Daily PRN Socorro Nugent MD        nicotine (NICODERM CQ) 21 MG/24HR 1 patch  1 patch Transdermal Daily MIGUELN Socorro Nugent MD   1 patch at 09/22/18 2216    acetaminophen (TYLENOL) tablet 650 mg  650 mg Oral Q4H PRN Socorro Nugent MD   650 mg at 09/20/18 1818    glucose (GLUTOSE) 40 % oral gel 15 g  15 g Oral PRN Socorro Nugent MD        dextrose 50 % solution 12.5 g  12.5 g Intravenous PRN Socorro Nugent MD        glucagon (rDNA) injection 1 mg  1 mg Intramuscular PRN Socorro Nugent MD        dextrose 5 % solution  100 mL/hr Intravenous PRN Socorro Nugent MD        chlorproMAZINE (THORAZINE) tablet 25 mg  25 mg Oral TID PRN Gerarda Natalee, APRN - CNP   25 mg at 09/22/18 2218    promethazine (PHENERGAN) injection 12.5 mg  12.5 mg Intravenous Q6H PRN Gerarda Natalee, APRN - CNP   12.5 mg at 09/23/18 1102       LABS & TESTS:      Lab Results   Component Value Date    WBC 10.4 09/21/2018    HGB 15.0 09/21/2018    HCT 45.6 09/21/2018    MCV ASA, statin  - symptomatically treating headache, n/v as needed    Thanks for this consult.  Neurology is signing off, please call with any questions       Ry Sears MD, MS

## 2018-09-24 ENCOUNTER — ANESTHESIA EVENT (OUTPATIENT)
Dept: OPERATING ROOM | Age: 38
DRG: 241 | End: 2018-09-24
Payer: MEDICAID

## 2018-09-24 ENCOUNTER — ANESTHESIA (OUTPATIENT)
Dept: OPERATING ROOM | Age: 38
DRG: 241 | End: 2018-09-24
Payer: MEDICAID

## 2018-09-24 VITALS
SYSTOLIC BLOOD PRESSURE: 92 MMHG | DIASTOLIC BLOOD PRESSURE: 59 MMHG | OXYGEN SATURATION: 83 % | RESPIRATION RATE: 12 BRPM

## 2018-09-24 VITALS
BODY MASS INDEX: 26.44 KG/M2 | HEIGHT: 72 IN | OXYGEN SATURATION: 98 % | TEMPERATURE: 97 F | WEIGHT: 195.2 LBS | SYSTOLIC BLOOD PRESSURE: 107 MMHG | DIASTOLIC BLOOD PRESSURE: 68 MMHG | RESPIRATION RATE: 17 BRPM | HEART RATE: 59 BPM

## 2018-09-24 LAB
ANTI-NUCLEAR ANTIBODY (ANA): NEGATIVE
CMV IGM: 0.4
CYTOMEGALOVIRUS IGG ANTIBODY: 8.6

## 2018-09-24 PROCEDURE — 6360000002 HC RX W HCPCS: Performed by: NURSE PRACTITIONER

## 2018-09-24 PROCEDURE — C9113 INJ PANTOPRAZOLE SODIUM, VIA: HCPCS | Performed by: NURSE PRACTITIONER

## 2018-09-24 PROCEDURE — 3700000001 HC ADD 15 MINUTES (ANESTHESIA): Performed by: INTERNAL MEDICINE

## 2018-09-24 PROCEDURE — 2580000003 HC RX 258: Performed by: ANESTHESIOLOGY

## 2018-09-24 PROCEDURE — 6370000000 HC RX 637 (ALT 250 FOR IP): Performed by: INTERNAL MEDICINE

## 2018-09-24 PROCEDURE — 43239 EGD BIOPSY SINGLE/MULTIPLE: CPT | Performed by: INTERNAL MEDICINE

## 2018-09-24 PROCEDURE — 6370000000 HC RX 637 (ALT 250 FOR IP): Performed by: FAMILY MEDICINE

## 2018-09-24 PROCEDURE — 99232 SBSQ HOSP IP/OBS MODERATE 35: CPT | Performed by: INTERNAL MEDICINE

## 2018-09-24 PROCEDURE — 0DB68ZX EXCISION OF STOMACH, VIA NATURAL OR ARTIFICIAL OPENING ENDOSCOPIC, DIAGNOSTIC: ICD-10-PCS | Performed by: INTERNAL MEDICINE

## 2018-09-24 PROCEDURE — 2709999900 HC NON-CHARGEABLE SUPPLY: Performed by: INTERNAL MEDICINE

## 2018-09-24 PROCEDURE — 7100000000 HC PACU RECOVERY - FIRST 15 MIN: Performed by: INTERNAL MEDICINE

## 2018-09-24 PROCEDURE — 88342 IMHCHEM/IMCYTCHM 1ST ANTB: CPT

## 2018-09-24 PROCEDURE — 97127 HC OT THER IVNTJ W/FOCUS COG FUNCJ: CPT | Performed by: NURSE PRACTITIONER

## 2018-09-24 PROCEDURE — 0DB88ZX EXCISION OF SMALL INTESTINE, VIA NATURAL OR ARTIFICIAL OPENING ENDOSCOPIC, DIAGNOSTIC: ICD-10-PCS | Performed by: INTERNAL MEDICINE

## 2018-09-24 PROCEDURE — 6360000002 HC RX W HCPCS

## 2018-09-24 PROCEDURE — 2580000003 HC RX 258: Performed by: NURSE PRACTITIONER

## 2018-09-24 PROCEDURE — 2580000003 HC RX 258: Performed by: FAMILY MEDICINE

## 2018-09-24 PROCEDURE — 88305 TISSUE EXAM BY PATHOLOGIST: CPT

## 2018-09-24 PROCEDURE — 97116 GAIT TRAINING THERAPY: CPT

## 2018-09-24 PROCEDURE — 3700000000 HC ANESTHESIA ATTENDED CARE: Performed by: INTERNAL MEDICINE

## 2018-09-24 PROCEDURE — 7100000001 HC PACU RECOVERY - ADDTL 15 MIN: Performed by: INTERNAL MEDICINE

## 2018-09-24 PROCEDURE — 97530 THERAPEUTIC ACTIVITIES: CPT | Performed by: NURSE PRACTITIONER

## 2018-09-24 PROCEDURE — 3609012400 HC EGD TRANSORAL BIOPSY SINGLE/MULTIPLE: Performed by: INTERNAL MEDICINE

## 2018-09-24 PROCEDURE — 6360000002 HC RX W HCPCS: Performed by: ANESTHESIOLOGY

## 2018-09-24 RX ORDER — ONDANSETRON 2 MG/ML
4 INJECTION INTRAMUSCULAR; INTRAVENOUS
Status: DISCONTINUED | OUTPATIENT
Start: 2018-09-24 | End: 2018-09-24

## 2018-09-24 RX ORDER — PANTOPRAZOLE SODIUM 40 MG/1
40 TABLET, DELAYED RELEASE ORAL
Status: DISCONTINUED | OUTPATIENT
Start: 2018-09-25 | End: 2018-09-24 | Stop reason: HOSPADM

## 2018-09-24 RX ORDER — PANTOPRAZOLE SODIUM 40 MG/1
40 TABLET, DELAYED RELEASE ORAL
Qty: 30 TABLET | Refills: 3 | Status: ON HOLD | OUTPATIENT
Start: 2018-09-25 | End: 2019-05-13 | Stop reason: HOSPADM

## 2018-09-24 RX ORDER — ONDANSETRON 4 MG/1
4 TABLET, FILM COATED ORAL EVERY 6 HOURS PRN
Qty: 20 TABLET | Refills: 0 | Status: SHIPPED | OUTPATIENT
Start: 2018-09-25 | End: 2019-12-26 | Stop reason: SDDI

## 2018-09-24 RX ORDER — POLYETHYLENE GLYCOL 3350 17 G/17G
17 POWDER, FOR SOLUTION ORAL DAILY
Qty: 30 EACH | Refills: 0 | Status: SHIPPED | OUTPATIENT
Start: 2018-09-25 | End: 2018-10-11

## 2018-09-24 RX ORDER — SODIUM CHLORIDE, SODIUM LACTATE, POTASSIUM CHLORIDE, CALCIUM CHLORIDE 600; 310; 30; 20 MG/100ML; MG/100ML; MG/100ML; MG/100ML
INJECTION, SOLUTION INTRAVENOUS CONTINUOUS PRN
Status: DISCONTINUED | OUTPATIENT
Start: 2018-09-24 | End: 2018-09-24 | Stop reason: SDUPTHER

## 2018-09-24 RX ORDER — ONDANSETRON 4 MG/1
4 TABLET, FILM COATED ORAL EVERY 6 HOURS PRN
Status: DISCONTINUED | OUTPATIENT
Start: 2018-09-25 | End: 2018-09-24 | Stop reason: HOSPADM

## 2018-09-24 RX ORDER — PROPOFOL 10 MG/ML
INJECTION, EMULSION INTRAVENOUS PRN
Status: DISCONTINUED | OUTPATIENT
Start: 2018-09-24 | End: 2018-09-24 | Stop reason: SDUPTHER

## 2018-09-24 RX ADMIN — PANTOPRAZOLE SODIUM 40 MG: 40 INJECTION, POWDER, FOR SOLUTION INTRAVENOUS at 09:15

## 2018-09-24 RX ADMIN — PROMETHAZINE HYDROCHLORIDE 12.5 MG: 25 INJECTION, SOLUTION INTRAMUSCULAR; INTRAVENOUS at 09:15

## 2018-09-24 RX ADMIN — ONDANSETRON 4 MG: 2 INJECTION INTRAMUSCULAR; INTRAVENOUS at 18:59

## 2018-09-24 RX ADMIN — Medication 10 ML: at 09:15

## 2018-09-24 RX ADMIN — PROPOFOL 150 MG: 10 INJECTION, EMULSION INTRAVENOUS at 16:30

## 2018-09-24 RX ADMIN — ONDANSETRON 4 MG: 2 INJECTION INTRAMUSCULAR; INTRAVENOUS at 12:59

## 2018-09-24 RX ADMIN — ATORVASTATIN CALCIUM 40 MG: 40 TABLET, FILM COATED ORAL at 18:55

## 2018-09-24 RX ADMIN — POLYETHYLENE GLYCOL 3350 17 G: 17 POWDER, FOR SOLUTION ORAL at 09:15

## 2018-09-24 RX ADMIN — PROMETHAZINE HYDROCHLORIDE 12.5 MG: 25 INJECTION, SOLUTION INTRAMUSCULAR; INTRAVENOUS at 00:37

## 2018-09-24 RX ADMIN — DOCUSATE SODIUM 100 MG: 100 CAPSULE, LIQUID FILLED ORAL at 09:14

## 2018-09-24 RX ADMIN — FOLIC ACID 1 MG: 1 TABLET ORAL at 09:14

## 2018-09-24 RX ADMIN — ASPIRIN 81 MG: 81 TABLET, COATED ORAL at 09:15

## 2018-09-24 RX ADMIN — PROMETHAZINE HYDROCHLORIDE 12.5 MG: 25 INJECTION, SOLUTION INTRAMUSCULAR; INTRAVENOUS at 15:49

## 2018-09-24 RX ADMIN — SODIUM CHLORIDE, POTASSIUM CHLORIDE, SODIUM LACTATE AND CALCIUM CHLORIDE: 600; 310; 30; 20 INJECTION, SOLUTION INTRAVENOUS at 16:29

## 2018-09-24 ASSESSMENT — PAIN SCALES - GENERAL
PAINLEVEL_OUTOF10: 0

## 2018-09-24 ASSESSMENT — PULMONARY FUNCTION TESTS
PIF_VALUE: 0
PIF_VALUE: 7
PIF_VALUE: 3

## 2018-09-24 ASSESSMENT — ENCOUNTER SYMPTOMS: SHORTNESS OF BREATH: 0

## 2018-09-24 NOTE — PLAN OF CARE
Problem: Falls - Risk of:  Goal: Will remain free from falls  Will remain free from falls   Outcome: Ongoing  Fall risk assessment completed. Patient instructed to use call light. Bed locked and in lowest position, side rails up 2/4, call light and bedside table within reach, clutter removed, and non-skid footwear on when pt out of bed. Hourly rounds will continue.

## 2018-09-24 NOTE — PROGRESS NOTES
Occupational Therapy  Facility/Department: [facility] PROGRESSIVE CARE  Daily Treatment Note  NAME: Loev Boggs  : 1980  MRN: [id number]    Date of Service: 2018    Discharge Recommendations:  Outpatient OT  OT Equipment Recommendations  Other: Recommend full visual assessment and FCE prior to return to work. Patient Diagnosis(es): The primary encounter diagnosis was Dizziness. Diagnoses of Bradycardia, Dehydration, and Non-intractable vomiting with nausea, unspecified vomiting type were also pertinent to this visit. has a past medical history of Anxiety; Blind right eye; Cerebellar stroke ([facility] [address] Drug use; GERD (gastroesophageal reflux disease); Hepatitis C; Lung collapse; Pneumonia; and Psychiatric problem. has a past surgical history that includes lobectomy (Left); Endoscopy, colon, diagnostic; Colonoscopy; and eye surgery (Right). Restrictions  Restrictions/Precautions  Restrictions/Precautions: Fall Risk  Position Activity Restriction  Other position/activity restrictions:  Up with assist, RUE IV, blind R eye  Subjective   General  Chart Reviewed: Yes  Patient assessed for rehabilitation services?: Yes  Response to previous treatment: Patient with no complaints from previous session  Family / Caregiver Present: No  Pre Treatment Pain Screening  Intervention List: Patient able to continue with treatment  Pain Assessment  Patient Currently in Pain: Denies  Vital Signs  Patient Currently in Pain: Denies  Oxygen Therapy  O2 Device: None (Room air)   Orientation  Orientation  Overall Orientation Status: Within Functional Limits  Objective             Balance  Sitting Balance: Independent  Standing Balance: Supervision  Standing Balance  Sit to stand: Supervision  Stand to sit: Supervision  Functional Mobility  Functional - Mobility Device: No device  Activity: Other  Assist Level: Supervision  Bed mobility  Supine to Sit: Independent  Sit to Supine: Independent  Scooting: Independent  Transfers  Stand Step Transfers: Supervision  Sit to stand: Supervision  Stand to sit: Supervision  Attendance  Participation: Active participation                    Cognition  Overall Cognitive Status: WFL     Perception  Overall Perceptual Status: Impaired  MVPT: Raw score:28 Standard score:76 Percentile rank:5 Age equivalent:9-1; Pt demo increased deficits in areas of visual closure, and spatial relationships        Additional Activities Comment  Additional Activities:      Upon writer exit, call light within reach, pt retired to bed. All lines intact and patient positioned comfortably. All patient needs addressed prior to ending therapy session. Chart reviewed prior to treatment and patient is agreeable for therapy. RN reports patient is medically stable for therapy treatment this date. Assessment   Performance deficits / Impairments: Decreased functional mobility ; Decreased ADL status; Decreased endurance;Decreased sensation;Decreased coordination;Decreased balance;Decreased vision/visual deficit; Decreased fine motor control  Assessment: 33 y/o male s/p recent right cerebellar CVA with deficits of dizziness, decreased left UE coordination, decreased sensation left hemibody, and visual changes affecting ADL independence and mobility. Prognosis: Good  REQUIRES OT FOLLOW UP: Yes  Activity Tolerance  Activity Tolerance: Patient Tolerated treatment well  Safety Devices  Safety Devices in place: Yes  Type of devices: All fall risk precautions in place;Call light within reach; Left in bed          Plan   Plan  Times per week: 5-6  Times per day: Daily  Plan weeks: 2 weeks  Specific instructions for Next Treatment: AD for ADL. Safety with mobility for ADL due to dizziness and decreased depth perception.   Current Treatment Recommendations: Strengthening, Balance Training, Functional Mobility Training, Endurance Training, Neuromuscular Re-education, Safety Education & Training, Patient/Caregiver Education & Training, Self-Care / ADL    AM-PAC Score        AM-PAC Inpatient Daily Activity Raw Score: 23  AM-PAC Inpatient ADL T-Scale Score : 51.12  ADL Inpatient CMS 0-100% Score: 15.86  ADL Inpatient CMS G-Code Modifier : CI    Goals  Short term goals  Time Frame for Short term goals: Until d/c from facility. Short term goal 1: Pt to be Independent with dressing . Short term goal 2: Pt to be modified independent with bathing, AD as needed and increased time for safety. Short term goal 3: Pt to be Modified independent with mobility for ADL with AD as needed. Short term goal 4: Pt to demo increased safety awareness regarding balance deficits with ADL   Long term goals  Time Frame for Long term goals : 4 weeks  Long term goal 1: Pt to follow up with full visual assessment after d/c home. Long term goal 2: Pt to inquire about return to work, possible Functional Capacity Evaluation before return to work. Patient Goals   Patient goals : Improve balance and dizziness with mobility.        Therapy Time   Individual Concurrent Group Co-treatment   Time In 1400         Time Out 1438         Minutes [address], [place]

## 2018-09-24 NOTE — PROGRESS NOTES
DATE: 2018    NAME: Angelita Vergara  MRN: [id number]   : 1980    Patient not seen this date for Physical Therapy due to:  [] Blood transfusion in progress  [] Cancel by RN  [] Hemodialysis  [x]  Refusal by Patient (too tired, cant keep eyes open)  [] Spine Precautions   [] Strict Bedrest  [] Surgery  [] Testing      [] Other        [] PT being discontinued at this time. Patient independent. No further needs. [] PT being discontinued at this time as the patient has been transferred to hospice care. No further needs.     LILA GU, PTA

## 2018-09-24 NOTE — PROGRESS NOTES
nausea, vomiting  Neurological:  Positive for dizziness, negative for headache    Medications: Allergies:  No Known Allergies    Current Meds:   Scheduled Meds:    polyethylene glycol  17 g Oral Daily    pantoprazole  40 mg Intravenous Daily    And    sodium chloride (PF)  10 mL Intravenous Daily    docusate sodium  100 mg Oral BID    folic acid  1 mg Oral Daily    atorvastatin  40 mg Oral Nightly    aspirin  81 mg Oral Daily    sodium chloride flush  10 mL Intravenous 2 times per day     Continuous Infusions:    sodium chloride 100 mL/hr at 18 0651    dextrose       PRN Meds: ondansetron, calcium carbonate, sodium chloride flush, potassium chloride **OR** potassium chloride **OR** potassium chloride, magnesium sulfate, magnesium hydroxide, bisacodyl, nicotine, acetaminophen, glucose, dextrose, glucagon (rDNA), dextrose, chlorproMAZINE, promethazine    Data:     Past Medical History:   has a past medical history of Anxiety; Blind right eye; Cerebellar stroke ([facility] [address] Drug use; GERD (gastroesophageal reflux disease); Hepatitis C; Lung collapse; Pneumonia; and Psychiatric problem. Social History:   reports that he has been smoking Cigarettes. He has a 20.00 pack-year smoking history. He has quit using smokeless tobacco. His smokeless tobacco use included Chew. He reports that he drinks alcohol. He reports that he uses drugs, including Cocaine. Family History: History reviewed. No pertinent family history. Vitals:  BP (!) 96/57   Pulse 54   Temp 97.7 °F (36.5 °C) (Oral)   Resp 18   Ht 6' (1.829 m)   Wt 195 lb 3.2 oz (88.5 kg)   SpO2 96%   BMI 26.47 kg/m²   Temp (24hrs), Av.9 °F (36.6 °C), Min:97.7 °F (36.5 °C), Max:98.1 °F (36.7 °C)    Recent Labs      18   2041  18   0811  18   1219  18   1613   POCGLU  148*  119*  115*  149*       I/O (24Hr):     Intake/Output Summary (Last 24 hours) at 18 2922  Last data filed at 18 0605   Gross per 24 hour

## 2018-09-24 NOTE — ANESTHESIA PRE PROCEDURE
Department of Anesthesiology  Preprocedure Note       Name:  Kathleen Parker   Age:  34 y.o.  :  1980                                          MRN:  [id number]         Date:  2018      Surgeon: Santosh Zelaya):  Celena Sanabria MD    Procedure: Procedure(s):  EGD ESOPHAGOGASTRODUODENOSCOPY    Medications prior to admission:   Prior to Admission medications    Medication Sig Start Date End Date Taking?  Authorizing Provider   acetaminophen (TYLENOL) 325 MG tablet Take 325 mg by mouth daily as needed for Pain   Yes Historical Provider, MD   calcium carbonate (TUMS) 500 MG chewable tablet Take 1 tablet by mouth daily as needed for Heartburn   Yes Historical Provider, MD   bisacodyl (DULCOLAX) 5 MG EC tablet Take 5 mg by mouth daily as needed for Constipation   Yes Historical Provider, MD   aspirin 81 MG EC tablet Take 1 tablet by mouth daily 18   Federico Mensah DO   atorvastatin (LIPITOR) 40 MG tablet Take 1 tablet by mouth nightly 18   Federico Mensah,    famotidine (PEPCID) 20 MG tablet Take 1 tablet by mouth 2 times daily 18   Jasper Lema,    folic acid (FOLVITE) 1 MG tablet Take 1 tablet by mouth daily 18   Jasper Lema,        Current medications:    Current Facility-Administered Medications   Medication Dose Route Frequency Provider Last Rate Last Dose    polyethylene glycol (GLYCOLAX) packet 17 g  17 g Oral Daily Randall Kingsley MD   17 g at 18 0915    pantoprazole (PROTONIX) injection 40 mg  40 mg Intravenous Daily Scar Medel APRN - CNP   40 mg at 18 0915    And    sodium chloride (PF) 0.9 % injection 10 mL  10 mL Intravenous Daily Roseyashira Medel APRN - CNP   10 mL at 18 0915    docusate sodium (COLACE) capsule 100 mg  100 mg Oral BID Bandar Dorman MD   100 mg at 18 0914    0.9 % sodium chloride infusion   Intravenous Continuous Bandar Dorman  mL/hr at 18 0651      ondansetron (ZOFRAN) injection 4 mg 4 mg Intravenous PRN Shannan Rosado MD   4 mg at 28/94/09 9232    folic acid (FOLVITE) tablet 1 mg  1 mg Oral Daily Javad Mccracken MD   1 mg at 09/24/18 0914    calcium carbonate (TUMS) chewable tablet 500 mg  1 tablet Oral Daily PRN Javad Mccracken MD   500 mg at 09/22/18 2216    atorvastatin (LIPITOR) tablet 40 mg  40 mg Oral Nightly Javad Mccracken MD   40 mg at 09/23/18 2051    aspirin EC tablet 81 mg  81 mg Oral Daily Javad Mccracken MD   81 mg at 09/24/18 0915    sodium chloride flush 0.9 % injection 10 mL  10 mL Intravenous 2 times per day Javad Mccracken MD   10 mL at 09/24/18 0915    sodium chloride flush 0.9 % injection 10 mL  10 mL Intravenous PRN Javad Mccracken MD        potassium chloride (KLOR-CON M) extended release tablet 40 mEq  40 mEq Oral PRN Javad Mccracken MD        Or   Huy Franco potassium chloride 20 MEQ/15ML (10%) oral solution 40 mEq  40 mEq Oral PRN Javad Mccracken MD        Or   Huy Franco potassium chloride 10 mEq/100 mL IVPB (Peripheral Line)  10 mEq Intravenous PRN Javad Mccracken MD        magnesium sulfate 1 g in dextrose 5% 100 mL IVPB  1 g Intravenous PRN Javad Mccracken MD        magnesium hydroxide (MILK OF MAGNESIA) 400 MG/5ML suspension 30 mL  30 mL Oral Daily PRN Javad Mccracken MD        bisacodyl (DULCOLAX) suppository 10 mg  10 mg Rectal Daily PRN Javad Mccracken MD        nicotine (NICODERM CQ) 21 MG/24HR 1 patch  1 patch Transdermal Daily PRN Javad Mccracken MD   1 patch at 09/22/18 2216    acetaminophen (TYLENOL) tablet 650 mg  650 mg Oral Q4H PRN Javad Mccracken MD   650 mg at 09/20/18 1818    glucose (GLUTOSE) 40 % oral gel 15 g  15 g Oral PRN Javad Mccracken MD        dextrose 50 % solution 12.5 g  12.5 g Intravenous PRN Javad Mccracken MD        glucagon (rDNA) injection 1 mg  1 mg Intramuscular PRN Javad Mccracken MD        dextrose 5 % solution  100 mL/hr Intravenous PRN Javad Mccracken MD        chlorproMAZINE (THORAZINE) tablet 25 mg  25 mg Oral TID PRN PRICILA Villalobos - CNP   25 mg at 09/22/18 0360  promethazine (PHENERGAN) injection 12.5 mg  12.5 mg Intravenous Q6H PRN PRICILA Flores - CNP   12.5 mg at 09/24/18 1549       Allergies:  No Known Allergies    Problem List:    Patient Active Problem List   Diagnosis Code    Pneumothorax J93.9    C[facility]ellar stroke ([facility] I63.9    Cerebellar infarct ([facility]) I63.9    Bradycardia R00.1    Nausea and vomiting R11.2    Abdominal pain R10.9    Dizziness R42       Past Medical History:        Diagnosis Date    Anxiety     Blind right eye     C[facility]bellar stroke ([facility] 9/16/2018    Drug use     cocaine    GERD (gastroesophageal reflux disease)     Hepatitis C     Lung collapse     Pneumonia     Psychiatric problem        Past Surgical History:        Procedure Laterality Date    COLONOSCOPY      ENDOSCOPY, COLON, DIAGNOSTIC      EYE SURGERY Right     approximately 1998    LOBECTOMY Left     lower lobe       Social History:    Social History   Substance Use Topics    Smoking status: Current Every Day Smoker     Packs/day: 1.00     Years: 20.00     Types: Cigarettes    Smokeless tobacco: Former User     Types: Chew    Alcohol use Yes      Comment: socially maybe 1X/week                                Ready to quit: Not Answered  Counseling given: Not Answered      Vital Signs (Current):   Vitals:    09/24/18 0429 09/24/18 0706 09/24/18 1142 09/24/18 1539   BP: (!) 104/55 (!) 96/57 (!) 106/58 107/63   Pulse: 51 54 55 58   Resp: 18 18 16 16   Temp: 98.1 °F (36.7 °C) 97.7 °F (36.5 °C) 97.3 °F (36.3 °C) 98.2 °F (36.8 °C)   TempSrc: Oral Oral Oral Oral   SpO2: 93% 96% 96% 97%   Weight:       Height:                                                  BP Readings from Last 3 Encounters:   09/24/18 107/63   09/18/18 133/87   09/15/18 (!) 134/90       NPO Status:                                                                                 BMI:   Wt Readings from Last 3 Encounters:   09/23/18 195 lb 3.2 oz (88.5 kg)   09/16/18 191 lb 8 oz (86.9 kg) 09/15/18 190 lb (86.2 kg)     Body mass index is 26.47 kg/m². CBC:   Lab Results   Component Value Date    WBC 10.4 09/21/2018    RBC 4.97 09/21/2018    HGB 15.0 09/21/2018    HCT 45.6 09/21/2018    MCV 91.8 09/21/2018    RDW 12.5 09/21/2018     09/21/2018       CMP:   Lab Results   Component Value Date     09/22/2018    K 4.4 09/22/2018     09/22/2018    CO2 26 09/22/2018    BUN 15 09/22/2018    CREATININE 0.86 09/22/2018    GFRAA >60 09/22/2018    LABGLOM >60 09/22/2018    GLUCOSE 137 09/22/2018    PROT 6.3 09/21/2018    CALCIUM 8.7 09/22/2018    BILITOT 0.91 09/21/2018    ALKPHOS 53 09/21/2018    AST 42 09/21/2018    ALT 85 09/21/2018       POC Tests:   Recent Labs      09/22/18   1613   POCGLU  149*       Coags:   Lab Results   Component Value Date    PROTIME 10.7 09/21/2018    INR 1.0 09/21/2018    APTT 28.7 09/20/2018       HCG (If Applicable): No results found for: PREGTESTUR, PREGSERUM, HCG, HCGQUANT     ABGs:   Lab Results   Component Value Date    PHART 7.450 10/30/2015    PO2ART 114.0 10/30/2015    UFE1PWX 42.0 10/30/2015    VYH8KZX 28.8 10/30/2015    L8CFSAWW 99.0 10/30/2015        Type & Screen (If Applicable):  No results found for: LABABO, LABRH    Anesthesia Evaluation    Airway: Mallampati: I  TM distance: >3 FB   Neck ROM: full  Mouth opening: > = 3 FB Dental:          Pulmonary:normal exam        (-) shortness of breath                           Cardiovascular:        (-)  angina        Rate: normal                    Neuro/Psych:   (+) CVA: no interval change,             GI/Hepatic/Renal:   (+) hepatitis: C, liver disease:,           Endo/Other:                     Abdominal:           Vascular:                                        Anesthesia Plan      MAC     ASA 3             Anesthetic plan and risks discussed with patient.                       Leatha Kendrick MD   9/24/2018

## 2018-09-25 LAB
EBV EARLY ANTIGEN IGG: 14 U/ML
EBV INTERPRETATION: ABNORMAL
EBV NUCLEAR AG AB: 33 U/ML
EPSTEIN-BARR VCA IGG: 385 U/ML
EPSTEIN-BARR VCA IGM: 64 U/ML
SMOOTH MUSCLE ANTIBODY: NORMAL

## 2018-09-25 NOTE — DISCHARGE SUMMARY
[facility]    Discharge Summary     Patient ID: Nancy Sahni  :  1980   MRN: [id number]     ACCOUNT:  [de-identified]   Patient's PCP: No primary care provider on file. Admit Date: 2018   Discharge Date: 2018  Length of Stay: 4  Code Status:  Prior  Admitting Physician: Kim Nelson MD  Discharge Physician: Mahendra Draper MD     Active Discharge Diagnoses:     Hospital Problem Lists:  Principal Problem:    Cerebellar stroke [facility])  Active Problems:    Bradycardia    Nausea and vomiting    Abdominal pain    Dizziness  Resolved Problems:    * No resolved hospital problems. *      Admission Condition:  fair     Discharged Condition: fair    Hospital Stay:     Hospital Course:  35 yo m with h/o recent history of cerebellar stroke presented to ED with nausea, vomiting, continued dizziness, and headache. Was recently admitted to [facility] and treated for cerebellar stroke. Patient was also evaluated by neurosurgery and also by cardiology (for JESSENIA) at that time. Repeat CT was done on  which showed stable evolution of infarct and patient was discharged on . This admission presented with similar symptoms headache, dizziness, n/v. In ED, patient was found to be bradycardic and hypotensive. Patient was admitted to ICU for close monitoring. Cardiology consulted, medications adjusted, recommended holter on d/c. Neurology consulted, repeat imaging demonstrated stable old CVA. GI consulted for N/V, abdominal pain, underwent EGD on  demonstrating esophagitis and gastritis. Symptoms improved s/p EGD.      Significant therapeutic interventions:   - Neurology following for cerebellar stroke - CT/MRI stable - s/p decadron   - Continue asa, lipitor  - Cardiology following for bradycardia - recommend holter on D/C  - GI consulted for persistent N/V, abdominal pain - s/p EGD (): severe esophagitis and gastritis, bxs taken  - Antiemetics PRN  - Thorazine PRN      Significant Diagnostic Studies:   Labs / Micro:  CBC:   Lab Results   Component Value Date    WBC 10.4 09/21/2018    RBC 4.97 09/21/2018    HGB 15.0 09/21/2018    HCT 45.6 09/21/2018    MCV 91.8 09/21/2018    MCH 30.2 09/21/2018    MCHC 32.9 09/21/2018    RDW 12.5 09/21/2018     09/21/2018     BMP:    Lab Results   Component Value Date    GLUCOSE 137 09/22/2018     09/22/2018    K 4.4 09/22/2018     09/22/2018    CO2 26 09/22/2018    ANIONGAP 7 09/22/2018    BUN 15 09/22/2018    CREATININE 0.86 09/22/2018    BUNCRER 17 09/22/2018    CALCIUM 8.7 09/22/2018    LABGLOM >60 09/22/2018    GFRAA >60 09/22/2018    GFR      09/22/2018    GFR NOT REPORTED 09/22/2018     HFP:    Lab Results   Component Value Date    PROT 6.3 09/21/2018     CMP:    Lab Results   Component Value Date    GLUCOSE 137 09/22/2018     09/22/2018    K 4.4 09/22/2018     09/22/2018    CO2 26 09/22/2018    BUN 15 09/22/2018    CREATININE 0.86 09/22/2018    ANIONGAP 7 09/22/2018    ALKPHOS 53 09/21/2018    ALT 85 09/21/2018    AST 42 09/21/2018    BILITOT 0.91 09/21/2018    LABALBU 4.0 09/21/2018    ALBUMIN NOT REPORTED 09/21/2018    LABGLOM >60 09/22/2018    GFRAA >60 09/22/2018    GFR      09/22/2018    GFR NOT REPORTED 09/22/2018    PROT 6.3 09/21/2018    CALCIUM 8.7 09/22/2018     PT/INR:  No results found for: PTINR  PTT:   Lab Results   Component Value Date    APTT 28.7 09/20/2018     FLP:    Lab Results   Component Value Date    CHOL 146 09/16/2018    TRIG 85 09/16/2018    HDL 39 09/16/2018     U/A:    Lab Results   Component Value Date    COLORU YELLOW 09/20/2018    TURBIDITY CLEAR 09/20/2018    SPECGRAV 1.010 09/20/2018    HGBUR NEGATIVE 09/20/2018    PHUR 6.0 09/20/2018    PROTEINU NEGATIVE 09/20/2018    GLUCOSEU NEGATIVE 09/20/2018    KETUA NEGATIVE 09/20/2018    BILIRUBINUR NEGATIVE 09/20/2018    UROBILINOGEN Normal 09/20/2018    NITRU NEGATIVE 09/20/2018    LEUKOCYTESUR NEGATIVE 09/20/2018     TSH: Lab Results   Component Value Date    TSH 0.39 09/16/2018         Radiology:    Ct Abdomen Pelvis Wo Contrast    Result Date: 9/20/2018  EXAMINATION: CT OF THE ABDOMEN AND PELVIS WITHOUT CONTRAST 9/20/2018 3:07 pm TECHNIQUE: CT of the abdomen and pelvis was performed without the administration of intravenous contrast. Multiplanar reformatted images are provided for review. Dose modulation, iterative reconstruction, and/or weight based adjustment of the mA/kV was utilized to reduce the radiation dose to as low as reasonably achievable. COMPARISON: 10/30/2015 HISTORY: ORDERING SYSTEM PROVIDED HISTORY: Abdominal pain FINDINGS: Lower Chest: Visualized lung bases are clear without focal airspace consolidation, sizeable effusion, or pneumothorax. No definite pulmonary nodules or masses. Base of the heart is normal in size without pericardial fluid collection. Organs: The liver, gallbladder, pancreas, and spleen are grossly within normal limits. No evidence for intrahepatic or extrahepatic biliary ductal dilatation. GI/Bowel: There is no evidence for bowel obstruction or inflammation. No free intraperitoneal air or fluid. Nonspecific generalized wall thickening of the sigmoid colon. Small bowel loops are normal in caliber. Small hiatal hernia. Appendix is unremarkable. Pelvis: No evidence for free fluid. Peritoneum/Retroperitoneum: Adrenal glands are normal in size and configuration. The kidneys are normal in size without definite nephrolithiasis or hydronephrosis. The ureters run a nonobstructed course to a normal-appearing urinary bladder. Vasculature: The aorta is normal in caliber. No definite lymphadenopathy identified. Bones/Soft Tissues: No evidence for acute fracture or dislocation. No lytic or blastic lesions. No evidence for acute intra-abdominal or intrapelvic pathology. No bowel obstruction or inflammation. No evidence for nephrolithiasis or urinary obstruction. Small hiatal hernia. Nonspecific generalized wall thickening of the sigmoid colon, at least partially due to underdistention. Ct Head Wo Contrast    Result Date: 9/20/2018  EXAMINATION: CT OF THE HEAD WITHOUT CONTRAST  9/17/2018 11:52 am TECHNIQUE: CT of the head was performed without the administration of intravenous contrast. Dose modulation, iterative reconstruction, and/or weight based adjustment of the mA/kV was utilized to reduce the radiation dose to as low as reasonably achievable. COMPARISON: CT head 09/15/2018 HISTORY: ORDERING SYSTEM PROVIDED HISTORY: EVALUATE CEREBELLAR STROKE TECHNOLOGIST PROVIDED HISTORY: Initial evaluation. FINDINGS: BRAIN/VENTRICLES: There is a wedge-shaped area of hypoattenuation in the left cerebellum consistent with the known left cerebellar infarct. The ventricles and cisternal spaces are normal in size, shape, and configuration for the age of the patient. No other areas of abnormal attenuation are identified. There is no midline shift or mass effect. No hemorrhage is identified in the brain parenchyma. ORBITS: The visualized portion of the orbits demonstrate no acute abnormality. SINUSES:  The visualized paranasal sinuses and mastoid air cells are clear. SOFT TISSUES/SKULL:  No acute abnormality of the visualized skull or soft tissues. Stable appearance to a left cerebellar infarct. No new or acute finding. Ct Head Wo Contrast    Result Date: 9/20/2018  EXAMINATION: CT OF THE HEAD WITHOUT CONTRAST  9/20/2018 1:24 pm TECHNIQUE: CT of the head was performed without the administration of intravenous contrast. Dose modulation, iterative reconstruction, and/or weight based adjustment of the mA/kV was utilized to reduce the radiation dose to as low as reasonably achievable. COMPARISON: 09/18/2018 HISTORY: ORDERING SYSTEM PROVIDED HISTORY: History of stroke nausea vomiting and constipation TECHNOLOGIST PROVIDED HISTORY: FINDINGS: Motion and streak artifact.  BRAIN/VENTRICLES: Infarct in evolution within the left inferior cerebellum noted, obscured by streak and motion artifact. There is no evidence for hemorrhagic conversion. No obvious intracranial hemorrhage, mass effect, or midline shift. ORBITS: The visualized portion of the orbits demonstrate no acute abnormality. SINUSES: The visualized paranasal sinuses and mastoid air cells demonstrate no acute abnormality. SOFT TISSUES/SKULL:  No acute abnormality of the visualized skull or soft tissues. Left inferior cerebellar infarct in evolution without evidence for hemorrhagic transformation. No intracranial hemorrhage, mass effect, or midline shift. Ct Head Wo Contrast    Result Date: 9/18/2018  EXAMINATION: CT OF THE HEAD WITHOUT CONTRAST  9/18/2018 9:07 am TECHNIQUE: CT of the head was performed without the administration of intravenous contrast. Dose modulation, iterative reconstruction, and/or weight based adjustment of the mA/kV was utilized to reduce the radiation dose to as low as reasonably achievable. COMPARISON: 09/17/2018, MRI brain 09/16/2018. HISTORY: ORDERING SYSTEM PROVIDED HISTORY: Evaluate cerebellar stroke. TECHNOLOGIST PROVIDED HISTORY: FINDINGS: BRAIN/VENTRICLES: Continued evolution of subacute left inferior cerebellar infarction in the posterior inferior cerebellar artery territory. There is parenchymal edema with mass effect and partial effacement of the 4th ventricle, similar to prior study. No evidence of hydrocephalus. No hemorrhagic transformation. Remainder of the gray-white matter differentiation is maintained. No new intracranial hemorrhage. No new acute infarctions. The basal cisterns are patent. ORBITS: The visualized portion of the orbits demonstrate no acute abnormality. SINUSES: The visualized paranasal sinuses and mastoid air cells demonstrate no acute abnormality. SOFT TISSUES/SKULL:  No acute abnormality of the visualized skull or soft tissues.      Continued evolution of subacute left inferior cerebellar infarction. No hemorrhagic transformation or obstructive hydrocephalus. Ct Head Wo Contrast    Result Date: 9/17/2018  EXAMINATION: CT OF THE HEAD WITHOUT CONTRAST  9/15/2018 11:09 pm TECHNIQUE: CT of the head was performed without the administration of intravenous contrast. Dose modulation, iterative reconstruction, and/or weight based adjustment of the mA/kV was utilized to reduce the radiation dose to as low as reasonably achievable. COMPARISON: None HISTORY: ORDERING SYSTEM PROVIDED HISTORY: HEADACHE TECHNOLOGIST PROVIDED HISTORY: FINDINGS: BRAIN/VENTRICLES: Left cerebellar hemisphere hypoattenuation with regional mass effect partially compressing the 4th ventricle. Hypoattenuation within the left renate and medulla noted. No hydrocephalus. Supratentorially, gray-white matter differentiation is preserved. No acute intracranial hemorrhage. ORBITS: The visualized portion of the orbits demonstrate no acute abnormality. SINUSES: The visualized paranasal sinuses and mastoid air cells demonstrate no acute abnormality. SOFT TISSUES/SKULL:  No acute abnormality of the visualized skull or soft tissues. Left cerebellar hemisphere acute to subacute infarction, within the left posterior inferior cerebellar artery territory. Consider further characterization by MRI. Critical results were called by Dr. Mann Valentine to Vladimir Reynolds on 9/15/2018 at 23:32. Xr Chest Portable    Result Date: 9/16/2018  EXAMINATION: SINGLE XRAY VIEW OF THE CHEST 9/16/2018 5:47 am COMPARISON: October 31, 2015 HISTORY: ORDERING SYSTEM PROVIDED HISTORY: stroke TECHNOLOGIST PROVIDED HISTORY: stroke FINDINGS: Surgical clips overlie the left lung apex. Cardiac monitoring leads overlie the chest.  Stable cardiomediastinal contours. No pleural effusion, pneumothorax, or focal airspace consolidation is apparent. No free subdiaphragmatic air. The osseous structures appear intact. No acute findings.      [address] [place] [address] normal in course and caliber without focal stenosis. The anterior cerebral and middle cerebral arteries demonstrate no focal stenosis. POSTERIOR CIRCULATION: Distal right vertebral artery is markedly thinned. Origins of the posterior inferior cerebellar arteries are identified and appear patent. Distal left posterior inferior cerebellar artery is not well visualized, possibly due to technical limitations. Basilar artery is patent. Posterior cerebral arteries are patent. BRAIN: Hypodensity within the left cerebellar hemisphere with mild mass effect on the 4th ventricle. No hydrocephalus. Left cerebellar hemisphere edema compatible with acute to subacute infarction. No acute arterial abnormality reliably identified. The distal left posterior inferior cerebellar artery is not well visualized but this may be related to limited resolution of CTA technique. Cta Head W Contrast    Result Date: 9/17/2018  EXAMINATION: CTA OF THE HEAD WITH CONTRAST; CTA OF THE NECK 9/16/2018 2:54 am: TECHNIQUE: CTA of the head/brain was performed with the administration of intravenous contrast. Multiplanar reformatted images are provided for review. MIP images are provided for review. Dose modulation, iterative reconstruction, and/or weight based adjustment of the mA/kV was utilized to reduce the radiation dose to as low as reasonably achievable.; CTA of the neck was performed with the administration of intravenous contrast. Multiplanar reformatted images are provided for review. MIP images are provided for review. Stenosis of the internal carotid arteries measured using NASCET criteria. Dose modulation, iterative reconstruction, and/or weight based adjustment of the mA/kV was utilized to reduce the radiation dose to as low as reasonably achievable.  COMPARISON: None HISTORY: ORDERING SYSTEM PROVIDED HISTORY: STROKE TECHNOLOGIST PROVIDED HISTORY: FINDINGS: CTA NECK: AORTIC ARCH/ARCH VESSELS: There is a normal branch pattern of the aortic arch. No significant stenosis is seen of the innominate artery or subclavian arteries. CAROTID ARTERIES: The common carotid arteries are normal in appearance without evidence of a flow limiting stenosis. The internal carotid arteries are normal in appearance without evidence of a flow limiting stenosis by NASCET criteria. No dissection or arterial injury is seen. VERTEBRAL ARTERIES: Dominant left vertebral artery nondominant diminutive right vertebral artery appear patent at the cervical level. SOFT TISSUES: The lung apices are clear. No cervical or superior mediastinal lymphadenopathy. The visualized portion of the larynx and pharynx appear unremarkable. The parotid, submandibular and thyroid glands demonstrate no acute abnormality. BONES: The visualized osseous structures appear unremarkable. CTA HEAD: ANTERIOR CIRCULATION: The internal carotid arteries are normal in course and caliber without focal stenosis. The anterior cerebral and middle cerebral arteries demonstrate no focal stenosis. POSTERIOR CIRCULATION: Distal right vertebral artery is markedly thinned. Origins of the posterior inferior cerebellar arteries are identified and appear patent. Distal left posterior inferior cerebellar artery is not well visualized, possibly due to technical limitations. Basilar artery is patent. Posterior cerebral arteries are patent. BRAIN: Hypodensity within the left cerebellar hemisphere with mild mass effect on the 4th ventricle. No hydrocephalus. Left cerebellar hemisphere edema compatible with acute to subacute infarction. No acute arterial abnormality reliably identified. The distal left posterior inferior cerebellar artery is not well visualized but this may be related to limited resolution of CTA technique.      Mri Brain Wo Contrast    Result Date: 9/24/2018  EXAMINATION: MRI OF THE BRAIN WITHOUT CONTRAST  9/21/2018 11:19 am TECHNIQUE: Multiplanar multisequence MRI of the brain was performed without the administration of intravenous contrast. COMPARISON: Noncontrast CT head 09/20/2018, MRI brain 09/16/2018 HISTORY: ORDERING SYSTEM PROVIDED HISTORY: STROKE Ongoing evaluation of acute symptoms FINDINGS: INTRACRANIAL STRUCTURES/VENTRICLES: Ventricles and sulci are normal in size. There is no midline shift or hydrocephalus. Basal cisterns are patent. There is redemonstration of an evolving, subacute left inferior cerebellar hemisphere infarct. Associated mass effect has mildly improved since the prior MRI. There is persistent, but improved partial effacement of the 4th ventricle. No extra-axial fluid collection. No new, acute infarct. There is no abnormal parenchymal gradient susceptibility. Flow voids of the proximal intracranial arteries and dural sinuses are unremarkable. ORBITS: The visualized portion of the orbits demonstrate no acute abnormality. SINUSES: No fluid is seen in the visualized paranasal sinuses and mastoid air cells. BONES/SOFT TISSUES: The bone marrow signal intensity appears normal. The craniocervical junction is normal in appearance. Expected evolution of a subacute left inferior cerebellar hemisphere infarct. There is persistent, but improved partial effacement of the 4th ventricle. Mri Brain Wo Contrast    Result Date: 9/16/2018  EXAMINATION: MRI OF THE BRAIN WITHOUT CONTRAST  9/16/2018 3:47 am TECHNIQUE: Multiplanar multisequence MRI of the brain was performed without the administration of intravenous contrast. COMPARISON: None. HISTORY: ORDERING SYSTEM PROVIDED HISTORY: STROKE TECHNOLOGIST PROVIDED HISTORY: Stroke protocol FINDINGS: INTRACRANIAL STRUCTURES/VENTRICLES: There is acute ischemic infarct involving approximately 40% of the left cerebellar hemisphere. There is edema with partial compression of the 4th ventricle. There is extensive at T2 FLAIR hyperintensity. Supratentorially, there is no acute infarct, mass or midline shift. No hydrocephalus.   No acute PROTONIX  Take 1 tablet by mouth every morning (before breakfast)     polyethylene glycol packet  Commonly known as:  GLYCOLAX  Take 17 g by mouth daily        CONTINUE taking these medications    acetaminophen 325 MG tablet  Commonly known as:  TYLENOL     aspirin 81 MG EC tablet  Take 1 tablet by mouth daily     atorvastatin 40 MG tablet  Commonly known as:  LIPITOR  Take 1 tablet by mouth nightly     calcium carbonate 500 MG chewable tablet  Commonly known as:  TUMS     DULCOLAX 5 MG EC tablet  Generic drug:  bisacodyl     folic acid 1 MG tablet  Commonly known as:  FOLVITE  Take 1 tablet by mouth daily        STOP taking these medications    famotidine 20 MG tablet  Commonly known as:  PEPCID           Where to Get Your Medications      These medications were sent to [facility] [address] [facility]. [address] [phone]  [address]    Phone:  [phone]   · ondansetron 4 MG tablet  · pantoprazole 40 MG tablet  · polyethylene glycol packet         Time Spent on discharge is  20 mins in patient examination, evaluation, counseling as well as medication reconciliation, prescriptions for required medications, discharge plan and follow up. Electronically signed by   Malinda Mckenna MD  9/25/2018  3:53 PM      Thank you Dr. James Decker primary care provider on file. for the opportunity to be involved in this patient's care.

## 2018-09-26 LAB
CULTURE: NORMAL
CULTURE: NORMAL
Lab: NORMAL
Lab: NORMAL
MITOCHONDRIAL ANTIBODY: 2.1 UNITS (ref 0–20)
SPECIMEN DESCRIPTION: NORMAL
SPECIMEN DESCRIPTION: NORMAL
STATUS: NORMAL
STATUS: NORMAL

## 2018-09-27 LAB
ANTICARDIOLIPIN IGA ANTIBODY: 0.3 APU
ANTICARDIOLIPIN IGG ANTIBODY: 1 GPU
CARDIOLIPIN AB IGM: 4.7 MPU
SURGICAL PATHOLOGY REPORT: NORMAL

## 2018-09-28 LAB
ACQUISITION DURATION: NORMAL S
AVERAGE HEART RATE: 78 BPM
HCV QUANTITATIVE: NORMAL
HEPATITIS C GENOTYPE: NORMAL
HOOKUP DATE: NORMAL
HOOKUP TIME: NORMAL
MAX HEART RATE TIME/DATE: NORMAL
MAX HEART RATE: 132 BPM
MIN HEART RATE TIME/DATE: NORMAL
MIN HEART RATE: 41 BPM
NUMBER OF QRS COMPLEXES: NORMAL
NUMBER OF SUPRAVENTRICULAR BEATS IN RUNS: 0
NUMBER OF SUPRAVENTRICULAR COUPLETS: 2
NUMBER OF SUPRAVENTRICULAR ECTOPICS: 13
NUMBER OF SUPRAVENTRICULAR ISOLATED BEATS: 9
NUMBER OF SUPRAVENTRICULAR RUNS: 0
NUMBER OF VENTRICULAR BEATS IN RUNS: 0
NUMBER OF VENTRICULAR BIGEMINAL CYCLES: 0
NUMBER OF VENTRICULAR COUPLETS: 0
NUMBER OF VENTRICULAR ECTOPICS: 6
NUMBER OF VENTRICULAR ISOLATED BEATS: 6
NUMBER OF VENTRICULAR RUNS: 0

## 2018-10-11 ENCOUNTER — OFFICE VISIT (OUTPATIENT)
Dept: NEUROLOGY | Age: 38
End: 2018-10-11
Payer: MEDICAID

## 2018-10-11 VITALS
SYSTOLIC BLOOD PRESSURE: 100 MMHG | WEIGHT: 201 LBS | HEART RATE: 68 BPM | HEIGHT: 72 IN | BODY MASS INDEX: 27.22 KG/M2 | DIASTOLIC BLOOD PRESSURE: 72 MMHG

## 2018-10-11 DIAGNOSIS — I63.9 CEREBELLAR STROKE (HCC): Primary | ICD-10-CM

## 2018-10-11 DIAGNOSIS — R51.9 HEADACHE DISORDER: ICD-10-CM

## 2018-10-11 PROCEDURE — 99214 OFFICE O/P EST MOD 30 MIN: CPT | Performed by: PSYCHIATRY & NEUROLOGY

## 2018-10-11 RX ORDER — TOPIRAMATE 25 MG/1
TABLET ORAL
Qty: 120 TABLET | Refills: 2 | Status: SHIPPED | OUTPATIENT
Start: 2018-10-11 | End: 2018-11-19 | Stop reason: ALTCHOICE

## 2018-10-11 RX ORDER — TOPIRAMATE 25 MG/1
TABLET ORAL
Qty: 120 TABLET | Refills: 2 | Status: SHIPPED | OUTPATIENT
Start: 2018-10-11 | End: 2018-10-11 | Stop reason: SDUPTHER

## 2018-10-11 NOTE — PROGRESS NOTES
[facility]            [facility], [address] [facility], [address] [facility]          Dept: [phone]          Dept Fax: [phone]        MD Renu Story MD Ahmed B. Jeanetta Bailiff, MD Emmalene Setting, MD Velia Sons, MD Lewanda Mealy, [facility] 70 UP NOTE                                                  PATIENT NAME: Guerrero Contreras   PATIENT MRN: [id number]  FOLLOW UP TODAY: 10/11/2018     CHIEF COMPLAINT: Follow-Up from Hospital and Neurologic Problem       INITIAL & INTERVAL HISTORY:     Guerrero Contreras is a 34year old [address] [facility], came for posthospitalization follow up regarding his ischemic stroke. recent hospitalizations:   9/16/2018- 9/18/2018 [facility]  large left cerebellar ischemic stroke  9/21- 24/2018 [facility] for persistent nausea/vomiting/dizziness. He was discharged to home with outpt therapy which he has not started. He still occasionally nauseated, no vomiting, when he gets up from sitting position, he feels lightheaded. Balance still little off, no falls. He sleeps a lot,10-12 hours a day. Denies depression, no issues with bowel movement and urination. Hiccups resolved. Slight headache on left occipital/neck since stroke, 4/10, intermittent, few times a day, last minutes to hours. Nothing made it better or worse. My consult note on 9/21/2018  Nausea/vomiting/dizziness   Since last stroke, symptoms (room spinning, nausea, vomiting, unsteadiness) never went away. HA left occipital, sharp 4-5/10, photophobia/phonophobia, constant, nothing made better, unsteadiness but no falls. Could not get food down because nausea/vomiting. Denies use substance since discharged. , last night in ED BP 98/62, HR 30s-40s   Last stroke: work up with severe occipital headache, associated with Methamphetamine, Urine 09/20/2018 NOT REPORTED  NEG Final    Tricyclic Antidepressants, Urine 09/20/2018 NOT REPORTED  NEG Final    MDMA, Urine 09/20/2018 NOT REPORTED  NEG Final    Buprenorphine Urine 09/20/2018 NOT REPORTED  NEG Final    Test Information 09/20/2018 Assay provides medical screening only. The absence of expected drug(s) and/or   Final    Comment:  metabolite(s) may indicate diluted or adulterated urine, limitations of testing   or timing of collection. Testing for legal purposes should be confirmed by another method. To request   confirmation of test result, please call the lab within 7 days of sample   submission.  Alb 09/21/2018 4.0  3.5 - 5.2 g/dL Final    Alkaline Phosphatase 09/21/2018 53  40 - 129 U/L Final    ALT 09/21/2018 85* 5 - 41 U/L Final    AST 09/21/2018 42* <40 U/L Final    Total Bilirubin 09/21/2018 0.91  0.3 - 1.2 mg/dL Final    Bilirubin, Direct 09/21/2018 0.24  <0.31 mg/dL Final    Bilirubin, Indirect 09/21/2018 0.67  0.00 - 1.00 mg/dL Final    Total Protein 09/21/2018 6.3* 6.4 - 8.3 g/dL Final    Globulin 09/21/2018 NOT REPORTED  1.5 - 3.8 g/dL Final    Albumin/Globulin Ratio 09/21/2018 NOT REPORTED  1.0 - 2.5 Final    Ventricular Rate 09/20/2018 43  BPM Final    Atrial Rate 09/20/2018 50  BPM Final    P-R Interval 09/20/2018 138  ms Final    QRS Duration 09/20/2018 98  ms Final    Q-T Interval 09/20/2018 490  ms Final    QTc Calculation (Bazett) 09/20/2018 414  ms Final    P Axis 09/20/2018 56  degrees Final    R Axis 09/20/2018 29  degrees Final    T Axis 09/20/2018 45  degrees Final    Troponin T 09/20/2018 <0.03  <0.03 ng/mL Final    Troponin T results cannot be compared to Troponin-I results.  Troponin Interp 09/20/2018        Final    Comment: Reference Range:   <0.03     Within reference range. 0.03-0.09 Possible myocardial damage.   Repeat at appropriate intervals to rule out chronic elevation.   >= 0.10   Indicative of myocardial Min Heart Rate 09/24/2018 41  BPM Final    Max Heart Rate Time/Date 09/24/2018 Sep 25 2018 14:39:43   Final    Min Heart Rate Time/Date 09/24/2018 Sep 26 2018 11:07:49   Final    Hepatitis C Genotype 09/23/2018 1a or 1b   Final    Comment: (NOTE)  Cannot be further subtyped into Type 1a or Type 1b due to  high conservation of the 5' untranslated region of the HCV  genome. In addition, Type 6 virus may be misclassified as  Type 1 in some cases. The Hepatitis C Virus High-Resolution  Genotype by Sequencing test ([employer] test code [id number])  provides a higher level of subtype resolution. INTERPRETIVE INFORMATION:  Hepatitis C Genotyping  Hepatitis C Viral RNA is tested using reverse transcription  polymerase chain reaction (RT-PCR) to amplify a specific portion  of the 5' untranslated region (5' UTR) of the viral genome. The  amplified nucleic acid is sequenced bi-directionally using  dye-terminator chemistry ([employer]). Sequencing data is compared to a  database of characterized sequences. Isolates of hepatitis C virus are grouped into six major genotypes  (1-6). These genotypes are subtyped according to sequence  characteristics. Due to high conservation of the 5' un-translated  region of the HCV genome, this test has limitation                           s in  differentiating subtype 1a from 1b. Therefore, these subtypes will  be reported as 1a or 1b. In rare instances, Type 6 virus may be  misclassified as Type 1. Test developed and characteristics determined by [facility]. See Compliance Statement B: [employer]/CS  Performed by [facility],  [place] [address], [address] [place] [phone]  www. George Lozano MD, Lab.  Director      HCV Quantitative 09/23/2018 NOT REPORTED   Final    Hep B Core Total Ab 09/23/2018 NONREACTIVE  NR Final    Hep B S Ab 09/23/2018 <3.50  <10 mIU/mL Final    Comment:       REFERENCE RANGE:  <10.0      NON-REACTIVE/NOT IMMUNE  >=10.0     REACTIVE/IMMUNE      +         -        +       +  Convalescent phase            +         +       +/-     +/-  Past infection                +         +        -       -  Chronic or reactivated        +         +        +       -      infection    +  = Antibody present    - = Antibody absent  Reference: Clinical Diagnosis and Management by Laboratory Methods;  17th Edition, Kae Carrizales M.D.     [facility] CMV IgM 09/23/2018 0.4  <0.9 Final    Comment:    Reference Range:  <0.9       Non Reactive  0.9 to 1.0 Indeterminate  >1.0       Reactive     The absence of CMV antibodies suggests that the patient has not been exposed to   the virus and is susceptible to primary infection. The presence of CMV IgM antibodies is indicative of recent exposure to the   virus. These results are not intended to replace virus isolation and should be   interpreted within the context of clinical and other findings.  CMV IgG 09/23/2018 8.6* <0.9 Final    Comment:    Reference Range:  <0.9       Non Reactive  0.9 to 1.0 Indeterminate  >1.0       Reactive     The absence of CMV antibodies suggests that the patient has not been exposed to   the virus and is susceptible to primary infection. The presence of CMV IgG antibodies is indicative of previous exposure to the   virus, but cannot distinguish between active or past infection. Patients suspected of having primary or active infection should be tested for   the concurrent presence of CMV IgM antibodies and/or retested for   seroconversion in 3 to 4 weeks. These results are not intended to replace virus isolation and should be   interpreted within the context of clinical and other findings.  Mitochondrial Ab 09/23/2018 2.1  0.0 - 20.0 Units Final    Comment: (NOTE)  INTERPRETIVE INFORMATION: Mitochondrial (M2) Antibody, IgG      20.0 Units or less . ........ Negative   20.1 - 24.9 Units. .......... Equivocal   25.0 Units or greater. ......  Positive  Performed by [facility] [place] [address] [place], [address] [facility] [phone]  www. Nhan Aquino MD, Lab. Director      GGT 09/23/2018 25  8 - 61 U/L Final    Surgical Pathology Report 09/24/2018    Final                    Value:(NOTE)  [id number]  [facility]  [facility]  ANATOMIC PATHOLOGY  [address] [place] [address], [facility] [address] [place], [address]  [phone]  Fax: [phone]    2 [facility]    Patient Name: Jacob Cruz: [id number]  Path Number: [id number]  Collected: 9/24/2018  Received: 9/25/2018  Reported: 9/27/2018 10:03    -- Diagnosis --  1.  ESOPHAGUS, BIOPSY (SQUAMOUS MUCOSA):      - FOCAL ULCERATION WITH MILD ACTIVE INFLAMMATION. 2.  STOMACH, BIOPSY:      - MILD ACTIVE CHRONIC GASTRITIS.      - NEGATIVE FOR HELICOBACTER PYLORI. 3.  SMALL INTESTINE, BIOPSY:      - NO HISTOLOGIC ABNORMALITY. 4.  SMALL INTESTINE-TYPE MUCOSA, BIOPSY (PER-PAPILLARY FOLD):      - NO SPECIFIC HISTOLOGIC ABNORMALITY. Getachew Price M.D.  **Electronically Signed Out**         [facility]/9/27/2018      Clinical Information  Operative Findings:  ESOPHAGEAL BX; STOMACH BX; SMALL BOWEL BX;  PERIPAPILLARY FOLD  Operation Performed:  EGD, ESOPHAGOGASTRODUODENOSCOPY                               Source of Specimen  1: ESOPHAGEAL BX (A)  2: STOMACH BX (B)  3: SMALL BOWEL BX (C)  4: PERIPAPILLARY FOLD (D)    Gross Description  1. \"YING KEST, ESOPHAGEAL BX\" Two tan-white tissue fragments, 0.2 x  0.2 x 0.1 cm and 0.6 x 0.2 x 0.1 cm. Entirely 1cs. 2. \"YING KEST, STOMACH BX\" Four tan-white tissue fragments from 0.1  to 0.3 cm and are 1.4 x 0.3 x 0.1 cm in aggregate. Entirely 1cs. 3. \"YING KEST, SMALL BOWEL BX\" Two tan-white tissue fragments, 0.2 x  0.2 x 0.1 cm and 0.3 x 0.3 x 0.1 cm. Entirely 1cs. 4. \"YING KEST, PERIPAPILLARY FOLD\" Two tan-white tissue fragments,  0.2 x 0.2 x 0.1 cm and 0.3 x 0.2 x 0.1 cm. Entirely 1cs. jg tm      Microscopic Description  1.  Squamous and no glandular

## 2018-10-12 ENCOUNTER — OFFICE VISIT (OUTPATIENT)
Dept: NEUROLOGY | Age: 38
End: 2018-10-12
Payer: MEDICAID

## 2018-10-12 VITALS
SYSTOLIC BLOOD PRESSURE: 130 MMHG | DIASTOLIC BLOOD PRESSURE: 80 MMHG | BODY MASS INDEX: 27.36 KG/M2 | HEART RATE: 82 BPM | HEIGHT: 72 IN | WEIGHT: 202 LBS

## 2018-10-12 DIAGNOSIS — Z71.6 ENCOUNTER FOR SMOKING CESSATION COUNSELING: ICD-10-CM

## 2018-10-12 DIAGNOSIS — I63.9 CEREBROVASCULAR ACCIDENT (CVA), UNSPECIFIED MECHANISM (HCC): ICD-10-CM

## 2018-10-12 DIAGNOSIS — Z09 HOSPITAL DISCHARGE FOLLOW-UP: ICD-10-CM

## 2018-10-12 DIAGNOSIS — R51.9 INTRACTABLE HEADACHE, UNSPECIFIED CHRONICITY PATTERN, UNSPECIFIED HEADACHE TYPE: ICD-10-CM

## 2018-10-12 DIAGNOSIS — H54.7 CONGENITAL BLINDNESS: Primary | ICD-10-CM

## 2018-10-12 PROCEDURE — 99214 OFFICE O/P EST MOD 30 MIN: CPT | Performed by: NEUROLOGICAL SURGERY

## 2018-11-01 ENCOUNTER — TELEPHONE (OUTPATIENT)
Dept: GASTROENTEROLOGY | Age: 38
End: 2018-11-01

## 2018-11-19 ENCOUNTER — OFFICE VISIT (OUTPATIENT)
Dept: NEUROLOGY | Age: 38
End: 2018-11-19
Payer: MEDICAID

## 2018-11-19 VITALS
DIASTOLIC BLOOD PRESSURE: 71 MMHG | HEIGHT: 72 IN | BODY MASS INDEX: 27.77 KG/M2 | WEIGHT: 205 LBS | HEART RATE: 66 BPM | SYSTOLIC BLOOD PRESSURE: 117 MMHG

## 2018-11-19 DIAGNOSIS — Z86.73 HISTORY OF STROKE: Primary | ICD-10-CM

## 2018-11-19 DIAGNOSIS — R51.9 HEADACHE DISORDER: ICD-10-CM

## 2018-11-19 DIAGNOSIS — F17.200 SMOKING: ICD-10-CM

## 2018-11-19 PROCEDURE — 99214 OFFICE O/P EST MOD 30 MIN: CPT | Performed by: PSYCHIATRY & NEUROLOGY

## 2018-11-19 RX ORDER — AMITRIPTYLINE HYDROCHLORIDE 25 MG/1
TABLET, FILM COATED ORAL
Qty: 30 TABLET | Refills: 3 | Status: ON HOLD | OUTPATIENT
Start: 2018-11-19 | End: 2019-05-13 | Stop reason: HOSPADM

## 2018-11-19 RX ORDER — AMITRIPTYLINE HYDROCHLORIDE 10 MG/1
10 TABLET, FILM COATED ORAL NIGHTLY
Qty: 7 TABLET | Refills: 0 | Status: ON HOLD | OUTPATIENT
Start: 2018-11-19 | End: 2019-05-13 | Stop reason: HOSPADM

## 2018-11-19 NOTE — PROGRESS NOTES
Troponin-I results.  Troponin Interp 09/20/2018        Final    Comment: Reference Range:   <0.03     Within reference range. 0.03-0.09 Possible myocardial damage. Repeat at appropriate intervals to rule out chronic elevation.   >= 0.10   Indicative of myocardial damage. Patients with high levels of Biotin oral intake (i.e >5mg/day) may have falsely   decreased Troponin T levels. Samples collected within 8 hours of biotin intake   may require additional information for diagnosis.  Myoglobin 09/20/2018 56  28 - 72 ng/mL Final    Troponin T 09/21/2018 <0.03  <0.03 ng/mL Final    Troponin T results cannot be compared to Troponin-I results.  Troponin Interp 09/21/2018        Final    Comment: Reference Range:   <0.03     Within reference range. 0.03-0.09 Possible myocardial damage. Repeat at appropriate intervals to rule out chronic elevation.   >= 0.10   Indicative of myocardial damage. Patients with high levels of Biotin oral intake (i.e >5mg/day) may have falsely   decreased Troponin T levels. Samples collected within 8 hours of biotin intake   may require additional information for diagnosis.  Myoglobin 09/21/2018 120* 28 - 72 ng/mL Final    Troponin T 09/21/2018 <0.03  <0.03 ng/mL Final    Troponin T results cannot be compared to Troponin-I results.  Troponin Interp 09/21/2018        Final    Comment: Reference Range:   <0.03     Within reference range. 0.03-0.09 Possible myocardial damage. Repeat at appropriate intervals to rule out chronic elevation.   >= 0.10   Indicative of myocardial damage. Patients with high levels of Biotin oral intake (i.e >5mg/day) may have falsely   decreased Troponin T levels. Samples collected within 8 hours of biotin intake   may require additional information for diagnosis.       Myoglobin 09/21/2018 92* 28 - 72 ng/mL Final    POC Glucose 09/21/2018 171* 75 - 110 mg/dL Final    POC Glucose 09/21/2018 120* 75 - 110 mg/dL Final    Serologies                                      Antibodies  Clinical Situation          IgG-VCA    EBNA      EA    IgM-VCA  _______________________________________________________________  No past infection             -         -        -       -  Acute infection               +         -        +       +  Convalescent phase            +         +       +/-     +/-  Past infection                +         +        -       -  Chronic or reactivated        +         +        +       -      infection    +  = Antibody present    - = Antibody absent  Reference: Clinical Diagnosis and Management by Laboratory Methods;  17th Edition, CHRIS Castillo CMV IgM 09/23/2018 0.4  <0.9 Final    Comment:    Reference Range:  <0.9       Non Reactive  0.9 to 1.0 Indeterminate  >1.0       Reactive     The absence of CMV antibodies suggests that the patient has not been exposed to   the virus and is susceptible to primary infection. The presence of CMV IgM antibodies is indicative of recent exposure to the   virus. These results are not intended to replace virus isolation and should be   interpreted within the context of clinical and other findings.  CMV IgG 09/23/2018 8.6* <0.9 Final    Comment:    Reference Range:  <0.9       Non Reactive  0.9 to 1.0 Indeterminate  >1.0       Reactive     The absence of CMV antibodies suggests that the patient has not been exposed to   the virus and is susceptible to primary infection. The presence of CMV IgG antibodies is indicative of previous exposure to the   virus, but cannot distinguish between active or past infection. Patients suspected of having primary or active infection should be tested for   the concurrent presence of CMV IgM antibodies and/or retested for   seroconversion in 3 to 4 weeks. These results are not intended to replace virus isolation and should be   interpreted within the context of clinical and other findings.       Mitochondrial Ab

## 2018-11-20 ENCOUNTER — TELEPHONE (OUTPATIENT)
Dept: NEUROLOGY | Age: 38
End: 2018-11-20

## 2019-01-15 ENCOUNTER — TELEPHONE (OUTPATIENT)
Dept: NEUROLOGY | Age: 39
End: 2019-01-15

## 2019-05-12 ENCOUNTER — APPOINTMENT (OUTPATIENT)
Dept: MRI IMAGING | Age: 39
DRG: 047 | End: 2019-05-12
Payer: MEDICAID

## 2019-05-12 ENCOUNTER — APPOINTMENT (OUTPATIENT)
Dept: CT IMAGING | Age: 39
DRG: 047 | End: 2019-05-12
Payer: MEDICAID

## 2019-05-12 ENCOUNTER — HOSPITAL ENCOUNTER (INPATIENT)
Age: 39
LOS: 1 days | Discharge: HOME | DRG: 047 | End: 2019-05-13
Attending: EMERGENCY MEDICINE | Admitting: PSYCHIATRY & NEUROLOGY
Payer: MEDICAID

## 2019-05-12 DIAGNOSIS — R42 DIZZINESS: ICD-10-CM

## 2019-05-12 DIAGNOSIS — R20.2 PARESTHESIA: Primary | ICD-10-CM

## 2019-05-12 PROBLEM — G45.9 TIA (TRANSIENT ISCHEMIC ATTACK): Status: ACTIVE | Noted: 2019-05-12

## 2019-05-12 LAB
% CKMB: 1.1 % (ref 0–3.5)
ABSOLUTE EOS #: 0.08 K/UL (ref 0–0.44)
ABSOLUTE IMMATURE GRANULOCYTE: 0.04 K/UL (ref 0–0.3)
ABSOLUTE LYMPH #: 2.02 K/UL (ref 1.1–3.7)
ABSOLUTE MONO #: 0.82 K/UL (ref 0.1–1.2)
ALLEN TEST: ABNORMAL
ANION GAP SERPL CALCULATED.3IONS-SCNC: 16 MMOL/L (ref 9–17)
ANION GAP: 10 MMOL/L (ref 7–16)
BASOPHILS # BLD: 1 % (ref 0–2)
BASOPHILS ABSOLUTE: 0.06 K/UL (ref 0–0.2)
BUN BLDV-MCNC: 14 MG/DL (ref 6–20)
BUN/CREAT BLD: ABNORMAL (ref 9–20)
CALCIUM SERPL-MCNC: 9.5 MG/DL (ref 8.6–10.4)
CHLORIDE BLD-SCNC: 104 MMOL/L (ref 98–107)
CK MB: 8.2 NG/ML
CKMB INTERPRETATION: ABNORMAL
CO2: 22 MMOL/L (ref 20–31)
CREAT SERPL-MCNC: 0.62 MG/DL (ref 0.7–1.2)
DIFFERENTIAL TYPE: ABNORMAL
EOSINOPHILS RELATIVE PERCENT: 1 % (ref 1–4)
FIO2: ABNORMAL
GFR AFRICAN AMERICAN: >60 ML/MIN
GFR NON-AFRICAN AMERICAN: >60 ML/MIN
GFR NON-AFRICAN AMERICAN: >60 ML/MIN
GFR SERPL CREATININE-BSD FRML MDRD: >60 ML/MIN
GFR SERPL CREATININE-BSD FRML MDRD: ABNORMAL ML/MIN/{1.73_M2}
GFR SERPL CREATININE-BSD FRML MDRD: ABNORMAL ML/MIN/{1.73_M2}
GFR SERPL CREATININE-BSD FRML MDRD: NORMAL ML/MIN/{1.73_M2}
GLUCOSE BLD-MCNC: 110 MG/DL (ref 74–100)
GLUCOSE BLD-MCNC: 112 MG/DL (ref 70–99)
GLUCOSE BLD-MCNC: 91 MG/DL (ref 75–110)
HCO3 VENOUS: 25.2 MMOL/L (ref 22–29)
HCT VFR BLD CALC: 49.1 % (ref 40.7–50.3)
HEMOGLOBIN: 16.7 G/DL (ref 13–17)
HOMOCYSTEINE: 9.4 UMOL/L
IMMATURE GRANULOCYTES: 0 %
INR BLD: 1
LYMPHOCYTES # BLD: 16 % (ref 24–43)
MCH RBC QN AUTO: 30.9 PG (ref 25.2–33.5)
MCHC RBC AUTO-ENTMCNC: 34 G/DL (ref 28.4–34.8)
MCV RBC AUTO: 90.9 FL (ref 82.6–102.9)
MODE: ABNORMAL
MONOCYTES # BLD: 6 % (ref 3–12)
MYOGLOBIN: 75 NG/ML (ref 28–72)
NEGATIVE BASE EXCESS, VEN: ABNORMAL (ref 0–2)
NRBC AUTOMATED: 0 PER 100 WBC
O2 DEVICE/FLOW/%: ABNORMAL
O2 SAT, VEN: 86 % (ref 60–85)
PARTIAL THROMBOPLASTIN TIME: 21.4 SEC (ref 20.5–30.5)
PATIENT TEMP: ABNORMAL
PCO2, VEN: 39.6 MM HG (ref 41–51)
PDW BLD-RTO: 12.6 % (ref 11.8–14.4)
PH VENOUS: 7.41 (ref 7.32–7.43)
PLATELET # BLD: ABNORMAL K/UL (ref 138–453)
PLATELET ESTIMATE: ABNORMAL
PLATELET, FLUORESCENCE: 260 K/UL (ref 138–453)
PLATELET, IMMATURE FRACTION: 5.7 % (ref 1.1–10.3)
PMV BLD AUTO: ABNORMAL FL (ref 8.1–13.5)
PO2, VEN: 50.4 MM HG (ref 30–50)
POC CHLORIDE: 107 MMOL/L (ref 98–107)
POC CREATININE: 0.8 MG/DL (ref 0.51–1.19)
POC HEMATOCRIT: 47 % (ref 41–53)
POC HEMOGLOBIN: 15.9 G/DL (ref 13.5–17.5)
POC IONIZED CALCIUM: 1.05 MMOL/L (ref 1.15–1.33)
POC LACTIC ACID: 1.33 MMOL/L (ref 0.56–1.39)
POC PCO2 TEMP: ABNORMAL MM HG
POC PH TEMP: ABNORMAL
POC PO2 TEMP: ABNORMAL MM HG
POC POTASSIUM: 4.6 MMOL/L (ref 3.5–4.5)
POC SODIUM: 142 MMOL/L (ref 138–146)
POSITIVE BASE EXCESS, VEN: 1 (ref 0–3)
POTASSIUM SERPL-SCNC: 4.4 MMOL/L (ref 3.7–5.3)
PROTHROMBIN TIME: 10.6 SEC (ref 9–12)
RBC # BLD: 5.4 M/UL (ref 4.21–5.77)
RBC # BLD: ABNORMAL 10*6/UL
SAMPLE SITE: ABNORMAL
SEG NEUTROPHILS: 77 % (ref 36–65)
SEGMENTED NEUTROPHILS ABSOLUTE COUNT: 9.87 K/UL (ref 1.5–8.1)
SODIUM BLD-SCNC: 142 MMOL/L (ref 135–144)
TOTAL CK: 767 U/L (ref 39–308)
TOTAL CO2, VENOUS: 26 MMOL/L (ref 23–30)
TROPONIN INTERP: ABNORMAL
TROPONIN T: ABNORMAL NG/ML
TROPONIN, HIGH SENSITIVITY: 7 NG/L (ref 0–22)
WBC # BLD: 12.9 K/UL (ref 3.5–11.3)
WBC # BLD: ABNORMAL 10*3/UL

## 2019-05-12 PROCEDURE — 70496 CT ANGIOGRAPHY HEAD: CPT

## 2019-05-12 PROCEDURE — 6360000002 HC RX W HCPCS: Performed by: STUDENT IN AN ORGANIZED HEALTH CARE EDUCATION/TRAINING PROGRAM

## 2019-05-12 PROCEDURE — 82947 ASSAY GLUCOSE BLOOD QUANT: CPT

## 2019-05-12 PROCEDURE — 70450 CT HEAD/BRAIN W/O DYE: CPT

## 2019-05-12 PROCEDURE — 81240 F2 GENE: CPT

## 2019-05-12 PROCEDURE — 83874 ASSAY OF MYOGLOBIN: CPT

## 2019-05-12 PROCEDURE — 82330 ASSAY OF CALCIUM: CPT

## 2019-05-12 PROCEDURE — 83090 ASSAY OF HOMOCYSTEINE: CPT

## 2019-05-12 PROCEDURE — 99254 IP/OBS CNSLTJ NEW/EST MOD 60: CPT | Performed by: PSYCHIATRY & NEUROLOGY

## 2019-05-12 PROCEDURE — 6370000000 HC RX 637 (ALT 250 FOR IP): Performed by: PSYCHIATRY & NEUROLOGY

## 2019-05-12 PROCEDURE — 70498 CT ANGIOGRAPHY NECK: CPT

## 2019-05-12 PROCEDURE — 96372 THER/PROPH/DIAG INJ SC/IM: CPT

## 2019-05-12 PROCEDURE — 93005 ELECTROCARDIOGRAM TRACING: CPT

## 2019-05-12 PROCEDURE — 84132 ASSAY OF SERUM POTASSIUM: CPT

## 2019-05-12 PROCEDURE — 85306 CLOT INHIBIT PROT S FREE: CPT

## 2019-05-12 PROCEDURE — 80048 BASIC METABOLIC PNL TOTAL CA: CPT

## 2019-05-12 PROCEDURE — 82553 CREATINE MB FRACTION: CPT

## 2019-05-12 PROCEDURE — 85055 RETICULATED PLATELET ASSAY: CPT

## 2019-05-12 PROCEDURE — 96374 THER/PROPH/DIAG INJ IV PUSH: CPT

## 2019-05-12 PROCEDURE — 96361 HYDRATE IV INFUSION ADD-ON: CPT

## 2019-05-12 PROCEDURE — 96375 TX/PRO/DX INJ NEW DRUG ADDON: CPT

## 2019-05-12 PROCEDURE — 2580000003 HC RX 258: Performed by: STUDENT IN AN ORGANIZED HEALTH CARE EDUCATION/TRAINING PROGRAM

## 2019-05-12 PROCEDURE — 2060000000 HC ICU INTERMEDIATE R&B

## 2019-05-12 PROCEDURE — 85014 HEMATOCRIT: CPT

## 2019-05-12 PROCEDURE — 82550 ASSAY OF CK (CPK): CPT

## 2019-05-12 PROCEDURE — 83605 ASSAY OF LACTIC ACID: CPT

## 2019-05-12 PROCEDURE — 85730 THROMBOPLASTIN TIME PARTIAL: CPT

## 2019-05-12 PROCEDURE — 36415 COLL VENOUS BLD VENIPUNCTURE: CPT

## 2019-05-12 PROCEDURE — 85610 PROTHROMBIN TIME: CPT

## 2019-05-12 PROCEDURE — 84484 ASSAY OF TROPONIN QUANT: CPT

## 2019-05-12 PROCEDURE — 81241 F5 GENE: CPT

## 2019-05-12 PROCEDURE — 82565 ASSAY OF CREATININE: CPT

## 2019-05-12 PROCEDURE — 82435 ASSAY OF BLOOD CHLORIDE: CPT

## 2019-05-12 PROCEDURE — 99223 1ST HOSP IP/OBS HIGH 75: CPT | Performed by: PSYCHIATRY & NEUROLOGY

## 2019-05-12 PROCEDURE — 6360000004 HC RX CONTRAST MEDICATION: Performed by: EMERGENCY MEDICINE

## 2019-05-12 PROCEDURE — G0378 HOSPITAL OBSERVATION PER HR: HCPCS

## 2019-05-12 PROCEDURE — 70551 MRI BRAIN STEM W/O DYE: CPT

## 2019-05-12 PROCEDURE — 6370000000 HC RX 637 (ALT 250 FOR IP): Performed by: STUDENT IN AN ORGANIZED HEALTH CARE EDUCATION/TRAINING PROGRAM

## 2019-05-12 PROCEDURE — 84295 ASSAY OF SERUM SODIUM: CPT

## 2019-05-12 PROCEDURE — 85303 CLOT INHIBIT PROT C ACTIVITY: CPT

## 2019-05-12 PROCEDURE — 85025 COMPLETE CBC W/AUTO DIFF WBC: CPT

## 2019-05-12 PROCEDURE — 82803 BLOOD GASES ANY COMBINATION: CPT

## 2019-05-12 PROCEDURE — 99285 EMERGENCY DEPT VISIT HI MDM: CPT

## 2019-05-12 RX ORDER — AMITRIPTYLINE HYDROCHLORIDE 25 MG/1
25 TABLET, FILM COATED ORAL NIGHTLY
Status: DISCONTINUED | OUTPATIENT
Start: 2019-05-12 | End: 2019-05-12

## 2019-05-12 RX ORDER — SODIUM CHLORIDE 0.9 % (FLUSH) 0.9 %
10 SYRINGE (ML) INJECTION PRN
Status: DISCONTINUED | OUTPATIENT
Start: 2019-05-12 | End: 2019-05-13 | Stop reason: HOSPADM

## 2019-05-12 RX ORDER — ASPIRIN 325 MG
325 TABLET ORAL ONCE
Status: COMPLETED | OUTPATIENT
Start: 2019-05-12 | End: 2019-05-12

## 2019-05-12 RX ORDER — KETOROLAC TROMETHAMINE 15 MG/ML
15 INJECTION, SOLUTION INTRAMUSCULAR; INTRAVENOUS EVERY 8 HOURS PRN
Status: DISCONTINUED | OUTPATIENT
Start: 2019-05-12 | End: 2019-05-13 | Stop reason: HOSPADM

## 2019-05-12 RX ORDER — ATORVASTATIN CALCIUM 80 MG/1
80 TABLET, FILM COATED ORAL NIGHTLY
Status: DISCONTINUED | OUTPATIENT
Start: 2019-05-12 | End: 2019-05-13 | Stop reason: HOSPADM

## 2019-05-12 RX ORDER — CLOPIDOGREL BISULFATE 75 MG/1
75 TABLET ORAL DAILY
Status: DISCONTINUED | OUTPATIENT
Start: 2019-05-13 | End: 2019-05-13 | Stop reason: HOSPADM

## 2019-05-12 RX ORDER — AMITRIPTYLINE HYDROCHLORIDE 10 MG/1
10 TABLET, FILM COATED ORAL NIGHTLY
Status: DISCONTINUED | OUTPATIENT
Start: 2019-05-12 | End: 2019-05-13 | Stop reason: HOSPADM

## 2019-05-12 RX ORDER — METOCLOPRAMIDE 5 MG/1
5 TABLET ORAL EVERY 6 HOURS PRN
Status: DISCONTINUED | OUTPATIENT
Start: 2019-05-12 | End: 2019-05-13 | Stop reason: HOSPADM

## 2019-05-12 RX ORDER — ASPIRIN 81 MG/1
81 TABLET ORAL DAILY
Status: DISCONTINUED | OUTPATIENT
Start: 2019-05-13 | End: 2019-05-13 | Stop reason: HOSPADM

## 2019-05-12 RX ORDER — SODIUM CHLORIDE 9 MG/ML
INJECTION, SOLUTION INTRAVENOUS CONTINUOUS
Status: DISCONTINUED | OUTPATIENT
Start: 2019-05-12 | End: 2019-05-13 | Stop reason: HOSPADM

## 2019-05-12 RX ORDER — HYDRALAZINE HYDROCHLORIDE 20 MG/ML
10 INJECTION INTRAMUSCULAR; INTRAVENOUS EVERY 6 HOURS PRN
Status: DISCONTINUED | OUTPATIENT
Start: 2019-05-12 | End: 2019-05-13 | Stop reason: HOSPADM

## 2019-05-12 RX ORDER — CLOPIDOGREL 300 MG/1
300 TABLET, FILM COATED ORAL ONCE
Status: COMPLETED | OUTPATIENT
Start: 2019-05-12 | End: 2019-05-12

## 2019-05-12 RX ORDER — SODIUM CHLORIDE 0.9 % (FLUSH) 0.9 %
10 SYRINGE (ML) INJECTION EVERY 12 HOURS SCHEDULED
Status: DISCONTINUED | OUTPATIENT
Start: 2019-05-12 | End: 2019-05-13 | Stop reason: HOSPADM

## 2019-05-12 RX ORDER — ONDANSETRON 2 MG/ML
4 INJECTION INTRAMUSCULAR; INTRAVENOUS EVERY 6 HOURS PRN
Status: DISCONTINUED | OUTPATIENT
Start: 2019-05-12 | End: 2019-05-13 | Stop reason: HOSPADM

## 2019-05-12 RX ADMIN — SODIUM CHLORIDE, PRESERVATIVE FREE 10 ML: 5 INJECTION INTRAVENOUS at 21:08

## 2019-05-12 RX ADMIN — KETOROLAC TROMETHAMINE 15 MG: 15 INJECTION, SOLUTION INTRAMUSCULAR; INTRAVENOUS at 21:08

## 2019-05-12 RX ADMIN — ENOXAPARIN SODIUM 40 MG: 40 INJECTION SUBCUTANEOUS at 18:00

## 2019-05-12 RX ADMIN — ASPIRIN 325 MG: 325 TABLET, COATED ORAL at 17:37

## 2019-05-12 RX ADMIN — SODIUM CHLORIDE: 9 INJECTION, SOLUTION INTRAVENOUS at 17:41

## 2019-05-12 RX ADMIN — CLOPIDOGREL BISULFATE 300 MG: 300 TABLET, FILM COATED ORAL at 17:38

## 2019-05-12 RX ADMIN — ONDANSETRON 4 MG: 2 INJECTION INTRAMUSCULAR; INTRAVENOUS at 21:08

## 2019-05-12 RX ADMIN — AMITRIPTYLINE HYDROCHLORIDE 10 MG: 25 TABLET, FILM COATED ORAL at 21:08

## 2019-05-12 RX ADMIN — ATORVASTATIN CALCIUM 80 MG: 80 TABLET, FILM COATED ORAL at 21:08

## 2019-05-12 RX ADMIN — IOHEXOL 75 ML: 350 INJECTION, SOLUTION INTRAVENOUS at 12:06

## 2019-05-12 ASSESSMENT — PAIN DESCRIPTION - LOCATION
LOCATION: HEAD

## 2019-05-12 ASSESSMENT — PAIN SCALES - GENERAL
PAINLEVEL_OUTOF10: 6
PAINLEVEL_OUTOF10: 3
PAINLEVEL_OUTOF10: 2
PAINLEVEL_OUTOF10: 5
PAINLEVEL_OUTOF10: 6
PAINLEVEL_OUTOF10: 8

## 2019-05-12 ASSESSMENT — ENCOUNTER SYMPTOMS
VOMITING: 0
CHEST TIGHTNESS: 0
NAUSEA: 0
CONSTIPATION: 0
ABDOMINAL PAIN: 0
COUGH: 0
BACK PAIN: 0
SHORTNESS OF BREATH: 0
EYE PAIN: 0
DIARRHEA: 0

## 2019-05-12 NOTE — ED PROVIDER NOTES
[facility] ED  eMERGENCY dEPARTMENT eNCOUnter   Attending Attestation     Pt Name: Mary Hauser  MRN: [id number]  Emerygfsara 1980  Date of evaluation: 5/12/19       Mary Hauser is a 34 y.o. male who presents with Extremity Weakness and Headache      History: Pt with balance issues, vision changes, headache. Pt has had stroke in the past.     Exam: HRRR, lungs CTABL, abdomen soft and non tender. Pt well appearing. See resident note for NIH scale. Plan for stroke workup and admission      I performed a history and physical examination of the patient and discussed management with the resident. I reviewed the residents note and agree with the documented findings and plan of care. Any areas of disagreement are noted on the chart. I was personally present for the key portions of any procedures. I have documented in the chart those procedures where I was not present during the key portions. I have personally reviewed all images and agree with the resident's interpretation. I have reviewed the emergency nurses triage note. I agree with the chief complaint, past medical history, past surgical history, allergies, medications, social and family history as documented unless otherwise noted below. Documentation of the HPI, Physical Exam and Medical Decision Making performed by medical students or scribes is based on my personal performance of the HPI, PE and MDM. For Phys Assistant/ Nurse Practitioner cases/documentation I have had a face to face evaluation of this patient and have completed at least one if not all key elements of the E/M (history, physical exam, and MDM). Additional findings are as noted. For APC cases I have personally evaluated and examined the patient in conjunction with the APC and agree with the treatment plan and disposition of the patient as recorded by the APC.     Davis Zepeda MD  Attending Emergency  Physician        Carl Tamayo MD  05/12/19 0181

## 2019-05-12 NOTE — ED PROVIDER NOTES
cough, chest tightness and shortness of breath. Cardiovascular: Negative for chest pain and palpitations. Gastrointestinal: Negative for abdominal pain, constipation, diarrhea, nausea and vomiting. Genitourinary: Negative for dysuria and frequency. Musculoskeletal: Negative for arthralgias, back pain, joint swelling and myalgias. Skin: Negative for rash and wound. Neurological: Negative for dizziness, light-headedness and headaches. PHYSICAL EXAM   (up to 7 for level 4, 8 or more for level 5)      Initial Vitals   ED Triage Vitals [05/12/19 1136]   BP Temp Temp Source Pulse Resp SpO2 Height Weight   (!) 149/110 98 °F (36.7 °C) Oral 66 20 -- 6' (1.829 m) 190 lb (86.2 kg)       Physical Exam   Constitutional: He appears well-developed and well-nourished. No distress. HENT:   Head: Normocephalic and atraumatic. Eyes:   Pupils equal reactive, and struck her eye movements are normal on the left side. Normal visual fields on the left. 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 Neck: Normal range of motion. Neck supple. No midline tenderness. Cardiovascular: Normal rate and regular rhythm. Exam reveals no friction rub. No murmur heard. Pulmonary/Chest: Effort normal. No stridor. No respiratory distress. He has no wheezes. Abdominal: He exhibits no distension. There is no tenderness. There is no guarding. Musculoskeletal: Normal range of motion. He exhibits no edema or deformity. Neurological:   Please see NIH stroke scale below. Patient is alert and oriented. Has normal strength in bilateral upper and lower abuse. Has mild drift in the left lower extremity, does not strike the bed, but does fall after about 3 seconds. No drift in the upper 20s. Has decreased sensation on both the left upper and lower extremity, but no carlos-neglect. No facial droop. Patient has no limits ataxia on bilateral lower x-rays or on the right upper injury.   Has normal finger to nose within finger is at eye when finger as above nose level. Patient has an initial NIH of 3. Her neurology chart on last visit, these findings had resolved. Last known well at 11 AM.   Therefore stroke alert was called. Concern for possible reocclusion of the cerebellar artery versus atypical migraine. Patient also has a macular papular rash, consider likely insect bites. Instead a friend's house last night. No sloughing of skin and no oral mucosal involvement. We'll get CT head, CTAs, basic labs, and EKG. DIAGNOSTIC RESULTS         LABS: Demetrius Mayra reviewed by me and abnormal results are displayed above     Labs Reviewed   STROKE PANEL - Abnormal; Notable for the following components:       Result Value    WBC 12.9 (*)     Seg Neutrophils 77 (*)     Lymphocytes 16 (*)     Segs Absolute 9.87 (*)     Myoglobin 75 (*)     Glucose 112 (*)     CREATININE 0.62 (*)     Total  (*)     All other components within normal limits   POTASSIUM (POC) - Abnormal; Notable for the following components:    POC Potassium 4.6 (*)     All other components within normal limits   CALCIUM, IONIC (POC) - Abnormal; Notable for the following components:    POC Ionized Calcium 1.05 (*)     All other components within normal limits   VENOUS BLOOD GAS, POINT OF CARE - Abnormal; Notable for the following components:    pCO2, [place] 39.6 (*)     pO2, [place] 50.4 (*)     O2 Sat, [place] 86 (*)     All other components within normal limits   POCT GLUCOSE - Abnormal; Notable for the following components:    POC Glucose 110 (*)     All other components within normal limits   HGB/HCT   SODIUM (POC)   CHLORIDE (POC)   IMMATURE PLATELET FRACTION   HEMOGLOBIN A1C   CREATININE W/GFR POINT OF CARE   LACTIC ACID,POINT OF CARE   ANION GAP (CALC) POC       RADIOLOGY: All plain film, CT, MRI, and formal ultrasound images (except ED bedside ultrasound) are read by the radiologist, see reports below, unless otherwise noted in ED Course, MDM or here.     Ct Head Wo Contrast    Result Date: 5/12/2019  EXAMINATION: CT OF THE HEAD WITHOUT CONTRAST  5/12/2019 12:00 pm TECHNIQUE: CT of the head was performed without the administration of intravenous contrast. Dose modulation, iterative reconstruction, and/or weight based adjustment of the mA/kV was utilized to reduce the radiation dose to as low as reasonably achievable. COMPARISON: Head CT 09/20/2018 HISTORY: ORDERING SYSTEM PROVIDED HISTORY: CVA TECHNOLOGIST PROVIDED HISTORY: Dizziness and headache. Acute symptoms. Initial encounter. FINDINGS: BRAIN/VENTRICLES: There is no acute intracranial hemorrhage, mass effect or midline shift. No abnormal extra-axial fluid collection. The gray-white differentiation is maintained without an acute infarct. There is no evidence of hydrocephalus. Left inferior cerebellar encephalomalacia compatible with old left PICA territory infarction. ORBITS: The visualized portion of the orbits demonstrate no acute abnormality. SINUSES: The visualized paranasal sinuses and mastoid air cells demonstrate no acute abnormality. SOFT TISSUES/SKULL:  No acute abnormality of the visualized skull or soft tissues. No acute intracranial abnormality. Left inferior cerebellar encephalomalacia consistent with old left PICA territory infarction. Findings were discussed with Dr. Geoff Peters at 12:12 pm on 5/12/2019. Cta Neck W Contrast    Result Date: 5/12/2019  EXAMINATION: CTA OF THE NECK; CTA OF THE HEAD WITH CONTRAST 5/12/2019 12:07 pm; 5/12/2019 11:48 am TECHNIQUE: CTA of the neck was performed with the administration of intravenous contrast. Multiplanar reformatted images are provided for review. MIP images are provided for review. Stenosis of the internal carotid arteries measured using NASCET criteria.  Dose modulation, iterative reconstruction, and/or weight based adjustment of the mA/kV was utilized to reduce the radiation dose to as low as reasonably achievable.; CTA of the head/brain was performed with the 5/12/2019 12:07 pm; 5/12/2019 11:48 am TECHNIQUE: CTA of the neck was performed with the administration of intravenous contrast. Multiplanar reformatted images are provided for review. MIP images are provided for review. Stenosis of the internal carotid arteries measured using NASCET criteria. Dose modulation, iterative reconstruction, and/or weight based adjustment of the mA/kV was utilized to reduce the radiation dose to as low as reasonably achievable.; CTA of the head/brain was performed with the administration of intravenous contrast. Multiplanar reformatted images are provided for review. MIP images are provided for review. Dose modulation, iterative reconstruction, and/or weight based adjustment of the mA/kV was utilized to reduce the radiation dose to as low as reasonably achievable. COMPARISON: 09/16/2018 HISTORY: ORDERING SYSTEM PROVIDED HISTORY: Dizziness TECHNOLOGIST PROVIDED HISTORY: FINDINGS: CTA NECK: AORTIC ARCH/ARCH VESSELS: There is a normal branch pattern of the aortic arch. No significant stenosis is seen of the innominate artery or subclavian arteries. CAROTID ARTERIES: The common carotid arteries are without flow limiting stenosis. The internal carotid arteries are without flow limiting stenosis by NASCET criteria. No dissection or arterial injury is seen. VERTEBRAL ARTERIES: The vertebral arteries both arise from the subclavian arteries and are without flow limiting stenosis or dissection. The left vertebral artery is dominant. The right vertebral artery is non dominant. SOFT TISSUES: The lung apices are clear. No cervical or superior mediastinal lymphadenopathy. The visualized portion of the larynx and pharynx appear unremarkable. The parotid, submandibular and thyroid glands demonstrate no acute abnormality. BONES: The visualized osseous structures appear unremarkable.  CTA HEAD: ANTERIOR CIRCULATION: The internal carotid arteries are normal in course and caliber without focal 1204  iohexol (OMNIPAQUE 350) solution 75 mL  IMG ONCE PRN      Last MAR action:  Given - by Dulce Huffman on 05/12/19 at 1206 [place] SALDIVAR          EMERGENCYDEPARTMENT COURSE:  ED Course as of May 12 1539   Sun May 12, 2019   1144 Stroke alert paged at this time for an NIH of 3. Last known well at 11 AM.    [CK]   1150 Stroke resident is in the ED. Patient in CT scanner.     [ET]   4580 Patient CT head and CTA does not show any acute infarct. Patient was admitted to the neurology service. Awaiting bed placement. [CK]      ED Course User Index  [CK] Lynne Haider MD        PROCEDURES:  None     CONSULTS:  IP CONSULT TO SOCIAL WORK    CRITICAL CARE:  0 mins not including procedure time, please also see attending note    FINAL IMPRESSION      1. Paresthesia    2.  Dizziness          DISPOSITION / PLAN     DISPOSITION Admitted 05/12/2019 12:40:24 PM      PATIENT REFERRED TO:  Alf Woodard MD  [address] [place] [address]  [phone]            DISCHARGE MEDICATIONS:  Current Discharge Medication List          Lynne Haider MD  Emergency Medicine Resident      (Please note that portions of this note were completed with a voice recognition program.  Efforts were made to edit the dictations but occasionallywords are mis-transcribed.)          Lynne Haider MD  Resident  05/12/19 [id number]

## 2019-05-12 NOTE — CONSULTS
Department of Endovascular Neurosurgery                                         Resident Consult Note  Stroke Alert paged @ 11:47  ER Room # 28  Arrival to patient bedside @ 11:49        Reason for Consult:  stroke  Requesting Physician:    Endovascular Neurosurgeon:   []Dr. Kane Hastings  [x]Dr. Yeimy Chopra    History Obtained From:  patient    CHIEF COMPLAINT:     Dizziness , numbness along with LUE and LLE numbnes and tingling      HISTORY OF PRESENT ILLNESS:       The patient is a 34 y.o. male with past medical history of  Left cerebellar  Infarction, migraine  who presents with complaint of dizziness along with headache located in retro orbital area  On L side ,left temporal  and left occipital areas which started 10:30 in the morning associated with left-sided numbness and tingling. He also complained of nausea but no episode of vomiting. He stated that  symptoms started while he was walking. No complain  of chest pain, palpitations, shortness of breath, any change in urinary or bowel habits, fever, blurry vision, any changes in hearing, any weakness of the extremities. Patient stated that he has stopped taking his medications including aspirin, Lipitor and amitriptyline for his migraine from December. He stated that from his previous stroke he didn't have any residual deficits except feeling that somebody's grabbing his left hand at times. CTA head and neck was done in September 2018 which showed left cerebellar hemisphere edema compatible with acute to subacute infarction, no vessel occlusion. 2D  ECHO done in September 2018 showed no thrombus or for vegetation, the patient's study was negative, EF- 55 -60%. TSH 0.39 in September 2018, hemoglobin A1c 4. Patient smoke one pack of cigarettes which last for 2 days, denies any drinks or recreational drug use. Patient has no known history of heart disease, liver or kidney disease.     In ED  Afebrile, blood pressure elevated  , WBC 12.9    PAST MEDICAL HISTORY :       Past Medical History:        Diagnosis Date    Anxiety     Blind right eye     Cerebellar stroke ([facility] [address] 9/16/2018    Drug use     cocaine    GERD (gastroesophageal reflux disease)     Hepatitis C     Lung collapse     Pneumonia     Psychiatric problem        Past Surgical History:        Procedure Laterality Date    COLONOSCOPY      ENDOSCOPY, COLON, DIAGNOSTIC      EYE SURGERY Right     approximately 1998    LOBECTOMY Left     lower lobe    UPPER GASTROINTESTINAL ENDOSCOPY  09/24/2018    with Dr. Pao Kolb. Biopsies taken.     UPPER GASTROINTESTINAL ENDOSCOPY N/A 9/24/2018    EGD BIOPSY performed by Willow Gar MD at [place] [address] History:   Social History     Socioeconomic History    Marital status: Single     Spouse name: Not on file    Number of children: Not on file    Years of education: Not on file    Highest education level: Not on file   Occupational History    Not on file   Social Needs    Financial resource strain: Not on file    Food insecurity:     Worry: Not on file     Inability: Not on file    Transportation needs:     Medical: Not on file     Non-medical: Not on file   Tobacco Use    Smoking status: Current Every Day Smoker     Packs/day: 0.50     Years: 20.00     Pack years: 10.00     Types: Cigarettes    Smokeless tobacco: Former User     Types: Chew   Substance and Sexual Activity    Alcohol use: Yes     Comment: socially    Drug use: No     Types: Cocaine     Comment: 1X this month    Sexual activity: Not on file   Lifestyle    Physical activity:     Days per week: Not on file     Minutes per session: Not on file    Stress: Not on file   Relationships    Social connections:     Talks on phone: Not on file     Gets together: Not on file     Attends [employer] service: Not on file     Active member of club or organization: Not on file     Attends meetings of clubs or organizations: Not on file     Relationship status: Not on file    Intimate partner violence:     Fear of current or ex partner: Not on file     Emotionally abused: Not on file     Physically abused: Not on file     Forced sexual activity: Not on file   Other Topics Concern    Not on file   Social History Narrative    Not on file       Family History:   No family history on file. Allergies:  Patient has no known allergies. Home Medications:  Prior to Admission medications    Medication Sig Start Date End Date Taking? Authorizing Provider   amitriptyline (ELAVIL) 25 MG tablet 25mg QHS after 7 days of 10mg QHS 11/19/18  Yes Osman Blank MD   amitriptyline (ELAVIL) 10 MG tablet Take 1 tablet by mouth nightly 11/19/18  Yes Osman Blank MD   ondansetron (ZOFRAN) 4 MG tablet Take 1 tablet by mouth every 6 hours as needed for Nausea or Vomiting 9/25/18  Yes Yady Sanchez MD   acetaminophen (TYLENOL) 325 MG tablet Take 325 mg by mouth daily as needed for Pain   Yes Historical Provider, MD   calcium carbonate (TUMS) 500 MG chewable tablet Take 1 tablet by mouth daily as needed for Heartburn   Yes Historical Provider, MD   aspirin 81 MG EC tablet Take 1 tablet by mouth daily 9/18/18  Yes Federico Mensah DO   atorvastatin (LIPITOR) 40 MG tablet Take 1 tablet by mouth nightly 9/18/18  Yes Federico Mensah DO   pantoprazole (PROTONIX) 40 MG tablet Take 1 tablet by mouth every morning (before breakfast) 9/25/18   Yady Sanchez MD   bisacodyl (DULCOLAX) 5 MG EC tablet Take 5 mg by mouth daily as needed for Constipation    Historical Provider, MD   folic acid (FOLVITE) 1 MG tablet Take 1 tablet by mouth daily 9/18/18   Cecil Sanchez DO       Current Medications:   No current facility-administered medications for this encounter.      REVIEW OF SYSTEMS:       CONSTITUTIONAL: negative for fatigue and malaise   EYES: negative for double vision and photophobia    HEENT: Positive for retro-orbital  Pain along headache on L side    RESPIRATORY: negative for cough, shortness of breath   CARDIOVASCULAR: negative for chest pain, palpitations   GASTROINTESTINAL: Positive  for nausea   GENITOURINARY: negative for incontinence   MUSCULOSKELETAL: negative for neck or back pain   NEUROLOGICAL: Positive for left sided numbness and tingling , dizziness   PSYCHIATRIC: negative for fatigue     Review of systems otherwise negative. PHYSICAL EXAM:       BP (!) 146/110   Pulse 74   Temp 98 °F (36.7 °C)   Resp 20   Ht 6' (1.829 m)   Wt 190 lb (86.2 kg)   BMI 25.77 kg/m²     CONSTITUTIONAL:  Well developed, well nourished, alert and oriented x 3, in no acute distress. GCS 15, nontoxic. No dysarthria, no aphasia   HEAD:  normocephalic, atraumatic    EYES:  PERRLA, EOMI.   ENT:  moist mucous membranes   NECK:  supple, symmetric, no midline tenderness to palpation    BACK:  without midline tenderness, step-offs or deformities    LUNGS:  Equal air entry bilaterally   CARDIOVASCULAR:  normal s1 / s2   ABDOMEN:  Soft, no rigidity   NEUROLOGIC:  Mental Status:  A & O x3,awake             Cranial Nerves:    II: Visual acuity:  normal  II: Visual fields:  normal  III: Pupils:  equal, round, reactive to light  III,IV,VI: Extra Ocular Movements: intact  V: Facial sensation:  intact  V: Corneal reflex:  completed.   intact  VII: Facial strength: intact  VIII: Hearing:  intact  IX: Palate:  intact  XI: Shoulder shrug:  intact  XII: Tongue movement:  normal    Motor Exam:    Drift:  absent  Tone:  normal    Motor exam is symmetrical 5 out of 5 all extremities bilaterally    Sensory:    Touch:    Right Upper Extremity:  normal  Left Upper Extremity:  Less as compared to R  Right Lower Extremity:  normal  Left Lower Extremity: Less as compared to R    Deep Tendon Reflexes:    Right Bicep:  2+  Left Bicep:  2+  Right Knee:  2+  Left Knee:  2+    Plantar Response:  Right:  downgoing  Left:  downgoing    Clonus:  absent  Finch's:  absent    Coordination/Dysmetria:  Heel to Shin:  Right: following admission order set for stroke admission:   [] [id number] - KEISHA Intercerebral Hemorrhage Admission   [] [id number] - KEISHA Sub Arachnoid Hemorrhage Admission   [] [id number] - KEISHA Ischemic Stroke TPA Treatment Focused   [] [id number] - IP Ischemic Stroke ICU Post Alteplase (TPA) Admission    [x] [id number] - GEN Ischemic Stroke Non-Thrombolytic Focussed     - CTH WO    - CTA H/N    - MRI Brain WO    - ECHO, Carotid US B/L    -  mg now then daily 81 mg     - Plavix 300mg now then 75mg daily       - Lipitor 80 mg nightly     - Fasting Lipid panel    - PT, OT, Speech eval     - Hydrate with 500 cc bolus then IVF NS @ 75cc/hr     - Telemetry     - Neuro checks per protocol   - We recommend SBP less than 200   - Blood glucose goal less than 180   - Please avoid dextrose containing solutions    Additional recommendations may follow     Please contact [facility] with any changes in patients neurologic status. Thank you for your consult.        L

## 2019-05-12 NOTE — ED NOTES
Pt at CT   Dr Maryann Solis and Nicolasa Roberto with Pharmacy @ bedside      Eileen Marie, RN  05/12/19 [id number]

## 2019-05-12 NOTE — H&P
(FOLVITE) 1 MG tablet Take 1 tablet by mouth daily 18   Scott Vaughan DO        Allergies:     Patient has no known allergies. Social History:     Tobacco:    reports that he has been smoking cigarettes. He has a 10.00 pack-year smoking history. He has quit using smokeless tobacco. His smokeless tobacco use included chew. Alcohol:      reports that he drinks alcohol. Drug Use:  reports that he does not use drugs. Family History:     No family history on file. Review of Systems:     ROS:  CONSTITUTIONAL: negative for fatigue and malaise   EYES: negative for double vision and photophobia    HEENT: Positive for retro-orbital  Pain along headache on L side    RESPIRATORY: negative for cough, shortness of breath   CARDIOVASCULAR: negative for chest pain, palpitations   GASTROINTESTINAL: Positive  for nausea   GENITOURINARY: negative for incontinence   MUSCULOSKELETAL: negative for neck or back pain   NEUROLOGICAL: Positive for left sided numbness and tingling , dizziness   PSYCHIATRIC: negative for fatigue              Physical Exam:   BP (!) 146/110   Pulse 74   Temp 98 °F (36.7 °C)   Resp 20   Ht 6' (1.829 m)   Wt 190 lb (86.2 kg)   BMI 25.77 kg/m²   Temp (24hrs), Av °F (36.7 °C), Min:98 °F (36.7 °C), Max:98 °F (36.7 °C)    Recent Labs     19  1148   POCGLU 110*     No intake or output data in the 24 hours ending 19 1243    General Appearance:  alert, well appearing, and in no acute distress  HEENT:  normocephalic, atraumatic. Lungs: Bilateral equal air entry, clear to ausculation, no wheezing, rales or rhonchi, normal effort  Cardiovascular: normal rate, regular rhythm, no murmur, gallop, rub.   Abdomen: Soft, nontender, nondistended, normal bowel sounds, no hepatomegaly or splenomegaly    NEUROLOGIC EXAMINATION    MENTAL STATUS:  Alert, oriented, intact memory, no confusion, normal speech, normal language, no hallucination or delusion   CRANIAL NERVES: II     - Visual fields intact to confrontation  III,IV,VI -  PERR, EOMs full, no ptosis  V     -     Normal facial sensation   VII    -     Normal facial symmetry  VIII   -     Intact hearing   IX,X -     Symmetrical palate  XI    -     Symmetrical shoulder shrug  XII   -     Midline tongue, no atrophy    MOTOR FUNCTION: RUE: Significant for good strength of grade 5/5 in proximal and distal muscle groups   LUE: Significant for good strength of grade 5/5 in proximal and distal muscle groups   RLE: Significant for good strength of grade 5/5 in proximal and distal muscle groups   LLE: Significant for good strength of grade 5/5 in proximal and distal muscle groups      Normal bulk, normal tone and no involuntary movements, no tremor   SENSORY FUNCTION: Sensation mildly impaired on L as compared to R side    CEREBELLAR FUNCTION:  Mild ataxia on R in FNT   REFLEX FUNCTION:  Symmetric in upper and lower extremities, no Babinski sign   STATION and GAIT  Normal gait and tandem station, normal tip toes and heel walking     Investigations:      Laboratory Testing:  Recent Results (from the past 24 hour(s))   Venous Blood Gas, POC    Collection Time: 05/12/19 11:48 AM   Result Value Ref Range    pH, [place] 7.412 7.320 - 7.430    pCO2, [place] 39.6 (L) 41.0 - 51.0 mm Hg    pO2, [place] 50.4 (H) 30.0 - 50.0 mm Hg    HCO3, Venous 25.2 22.0 - 29.0 mmol/L    Total CO2, Venous 26 23.0 - 30.0 mmol/L    Negative Base Excess, [place] NOT REPORTED 0.0 - 2.0    Positive Base Excess, [place] 1 0.0 - 3.0    O2 Sat, [place] 86 (H) 60.0 - 85.0 %    O2 Device/Flow/% NOT REPORTED     Marlon Test NOT REPORTED     Sample Site NOT REPORTED     Mode NOT REPORTED     FIO2 NOT REPORTED     Pt Temp NOT REPORTED     POC pH Temp NOT REPORTED     POC pCO2 Temp NOT REPORTED mm Hg    POC pO2 Temp NOT REPORTED mm Hg   Hemoglobin and hematocrit, blood    Collection Time: 05/12/19 11:48 AM   Result Value Ref Range    POC Hemoglobin 15.9 13.5 - 17.5 g/dL    POC Hematocrit 47 41 - 53 % Creatinine W/GFR Point of Care    Collection Time: 05/12/19 11:48 AM   Result Value Ref Range    POC Creatinine 0.80 0.51 - 1.19 mg/dL    GFR Comment >60 >60 mL/min    GFR Non-African American >60 >60 mL/min    GFR Comment         SODIUM (POC)    Collection Time: 05/12/19 11:48 AM   Result Value Ref Range    POC Sodium 142 138 - 146 mmol/L   POTASSIUM (POC)    Collection Time: 05/12/19 11:48 AM   Result Value Ref Range    POC Potassium 4.6 (H) 3.5 - 4.5 mmol/L   CHLORIDE (POC)    Collection Time: 05/12/19 11:48 AM   Result Value Ref Range    POC Chloride 107 98 - 107 mmol/L   CALCIUM, IONIC (POC)    Collection Time: 05/12/19 11:48 AM   Result Value Ref Range    POC Ionized Calcium 1.05 (L) 1.15 - 1.33 mmol/L   Lactic Acid, POC    Collection Time: 05/12/19 11:48 AM   Result Value Ref Range    POC Lactic Acid 1.33 0.56 - 1.39 mmol/L   POCT Glucose    Collection Time: 05/12/19 11:48 AM   Result Value Ref Range    POC Glucose 110 (H) 74 - 100 mg/dL   Anion Gap (Calc) POC    Collection Time: 05/12/19 11:48 AM   Result Value Ref Range    Anion Gap 10 7 - 16 mmol/L   STROKE PANEL    Collection Time: 05/12/19 11:57 AM   Result Value Ref Range    WBC 12.9 (H) 3.5 - 11.3 k/uL    RBC 5.40 4.21 - 5.77 m/uL    Hemoglobin 16.7 13.0 - 17.0 g/dL    Hematocrit 49.1 40.7 - 50.3 %    MCV 90.9 82.6 - 102.9 fL    MCH 30.9 25.2 - 33.5 pg    MCHC 34.0 28.4 - 34.8 g/dL    RDW 12.6 11.8 - 14.4 %    Platelets See Reflexed IPF Result 138 - 453 k/uL    MPV NOT REPORTED 8.1 - 13.5 fL    NRBC Automated 0.0 0.0 per 100 WBC    Differential Type NOT REPORTED     WBC Morphology NOT REPORTED     RBC Morphology NOT REPORTED     Platelet Estimate NOT REPORTED     Seg Neutrophils 77 (H) 36 - 65 %    Lymphocytes 16 (L) 24 - 43 %    Monocytes 6 3 - 12 %    Eosinophils % 1 1 - 4 %    Basophils 1 0 - 2 %    Immature Granulocytes 0 0 %    Segs Absolute 9.87 (H) 1.50 - 8.10 k/uL    Absolute Lymph # 2.02 1.10 - 3.70 k/uL    Absolute Mono # 0.82 0.10 - 1.20 k/uL    Absolute Eos # 0.08 0.00 - 0.44 k/uL    Basophils # 0.06 0.00 - 0.20 k/uL    Absolute Immature Granulocyte 0.04 0.00 - 0.30 k/uL    Protime 10.6 9.0 - 12.0 sec    INR 1.0     PTT 21.4 20.5 - 30.5 sec    Myoglobin 75 (H) 28 - 72 ng/mL    Troponin, High Sensitivity 7 0 - 22 ng/L    Troponin T NOT REPORTED <0.03 ng/mL    Troponin Interp NOT REPORTED     Glucose 112 (H) 70 - 99 mg/dL    BUN 14 6 - 20 mg/dL    CREATININE 0.62 (L) 0.70 - 1.20 mg/dL    Bun/Cre Ratio NOT REPORTED 9 - 20    Calcium 9.5 8.6 - 10.4 mg/dL    Sodium 142 135 - 144 mmol/L    Potassium 4.4 3.7 - 5.3 mmol/L    Chloride 104 98 - 107 mmol/L    CO2 22 20 - 31 mmol/L    Anion Gap 16 9 - 17 mmol/L    GFR Non-African American >60 >60 mL/min    GFR African American >60 >60 mL/min    GFR Comment          GFR Staging NOT REPORTED     Total  (H) 39 - 308 U/L    CK-MB 8.2 <10.5 ng/mL    % CKMB 1.1 0.0 - 3.5 %    CKMB Interpretation COMPATIBLE WITH SKELETAL MUSCLE ORIGIN    Immature Platelet Fraction    Collection Time: 05/12/19 11:57 AM   Result Value Ref Range    Platelet, Immature Fraction 5.7 1.1 - 10.3 %    Platelet, Fluorescence 260 138 - 453 k/uL       Imaging/Diagnostics  .) CT brain without contrast:     Acute intracranial vomiting, left inferior cerebellar encephalomalacia consistent with old left pica territory infarction     2.) CTA Head/Neck:    no Hemodynamically significant stenosis or branch occlusion in cervical or intracranial arterial circulation        Assessment :      Primary Problem  <principal problem not specified>    Active Hospital Problems    Diagnosis Date Noted    TIA (transient ischemic attack) [G45.9] 05/12/2019       Plan:     1.  Patient status Admit as Inpatient for TIA      Consultations:   IP CONSULT TO SOCIAL WORK    Patient is admitted as inpatient status because of co-morbidities listed above  severity of signs and symptoms as outlined, requirement for current medical therapies and most importantly because of direct risk to patient if care not provided in a hospital setting. 1.  Left-sided numbness along with dyspnea and headache likely due to TIA  CT head negative, CTA head and negative for any large vessel occlusion  Follow-up MRI brain,2D Echo, lipid panel, HbA1c  TPA not given as NIH scale 2  Give aspirin 325 mg, Plavix 300 mg for 1 dose   Continue aspirin 81 mg, Plavix 75 mg from tomorrow daily  Continue Lipitor 80 mg nightly    2. Hx  of migraine  Continue amitriptyline for prophylaxis extra  Motrin  for pain    3.   Hx  of smoking  Counseled quitting smoking

## 2019-05-12 NOTE — FLOWSHEET NOTE
[facility]     Emergency/Trauma Note    PATIENT NAME: Ez Eugene    Shift date: 5/12/19  Shift day: Sunday   Shift # 1    Room # 28/28   Name: Ez Eugene            Age: 34 y.o. Gender: male          Restorationism: Non-Sabianist  Place of Lutheran: None  Trauma/Incident type: Stroke Alert  Admit Date & Time: 5/12/2019 11:35 AM    PATIENT/EVENT DESCRIPTION:  Ez Eugene is a 34 y.o. male who arrived via ground ambulance from a friend's home in [facility]. Per report the patient presents with extremity weakness, dizziness and a headache. He had CT and MRI and is being diagnosed with a tia. Pt to be admitted to 28/28. SPIRITUAL ASSESSMENT/INTERVENTION:     05/12/19 1349   Encounter Summary   Services provided to: Patient and family together   Place of Buddhism None   Contact Gnosticist No   Continue Visiting   (5/12/19)   Complexity of Encounter Moderate   Length of Encounter 15 minutes   Spiritual Assessment Completed Yes   Crisis   Type Stroke Alert   Assessment Calm; Approachable; Hopeful;Coping   Intervention Active listening;Explored feelings, thoughts, concerns;Nurtured hope;Sustaining presence/ Ministry of presence; Discussed belief system/Cheondoism practices/james   Outcome Acceptance;Comfort;Expressed gratitude;Coping; Hopeful     I spoke with the patient and inquired about his thoughts and feelings. He told me that he had a stroke last fall and these symptoms reminded him of that episode. He complains of a headache and indicates that he has a history of migraines. His mother was in the room with him and is very attentive to him. I listened as he expressed his concerns. I asked if a prayer would be helpful and he told me \"Not now, probably later\". I wished him well and he thanked me for checking on him. PATIENT BELONGINGS:  With patient    ANY BELONGINGS OF SIGNIFICANT VALUE NOTED:  No.    REGISTRATION STAFF NOTIFIED?   Yes    WHAT IS YOUR SPIRITUAL CARE PLAN FOR THIS PATIENT?:  Chaplains are available for further spiritual and emotional support as requested by the patient.      Electronically signed by Aliyah Harris, on 5/12/2019 at 1:57 PM.  Harpreet Bailey  [phone]

## 2019-05-12 NOTE — ED TRIAGE NOTES
Pt c/o headache , and pain behind left eye. Pt sts he feels the same way like when he had a stroke back in September. + photosensitivity and generalized weakness + dizziness.

## 2019-05-12 NOTE — CARE COORDINATION
Case Management Initial Discharge Plan  Lona Xiao             Met with:patient and mother to discuss discharge plans. Information verified: address, contacts, phone number, , insurance Yes  PCP: Kyrie Viera MD  Date of last visit: 2018    Insurance Provider: [facility]    Discharge Planning    Living Arrangements:  Parent   Support Systems:  Parent    Home has 1 stories  0 stairs to climb to get into front door, none stairs to climb to reach second floor  Location of bedroom/bathroom in home ground level    Patient able to perform ADL's:Independent    Current Services (outpatient & in home) none  DME equipment: none  DME provider: none    Pharmacy: [facility] in [facility]   Potential Assistance Purchasing Medications:  No  Does patient want to participate in local refill/ meds to beds program?  No    Potential Assistance Needed:  N/A    Patient agreeable to home care: No  [place] of choice provided:  yes    Prior SNF/Rehab Placement and Facility: none  Agreeable to SNF/Rehab: No  [place] of choice provided: yes   Evaluation: yes    Expected Discharge date:  (TBD)  Patient expects to be discharged to:  home with mother. Follow Up Appointment: Best Day/ Time: (TBD)    Transportation provider: Private car   Transportation arrangements needed for discharge: No    Readmission Risk              Risk of Unplanned Readmission:        0             Does patient have a readmission risk score greater than 14?: No  If yes, follow-up appointment must be made within 7 days of discharge. Discharge Plan: Pt to d/c to home via private car with mother.           Electronically signed by Madai Howard RN on 19 at 1:44 PM

## 2019-05-12 NOTE — CONSULTS
NEUROLOGY INPATIENT CONSULT NOTE    5/12/2019         Bharti Rondon is a  34 y.o. male admitted on 5/12/2019 with  TIA (transient ischemic attack) [G45.9]  TIA (transient ischemic attack) [G45.9]      History is obtained mostly from the patient and the medical record and from the caregivers. Chart is reviewed and patient is examined. Briefly, this is a  34 y.o. 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 But his headache continued and described as pounding headache located in left frontotemporal region with 10/10 severity with associated photophobia and nausea. Headache has not gotten any better and he is requesting for medication. He also stated that he had improvement in migraine headaches and he had only one or 2 mild migraine attacks since stopping amitriptyline and he did not want to start it back. He never had any medication related adverse effects. He has been off of amitriptyline for the past 5 months. Regarding the stroke in September 2018; etiologic workup including JESSENIA and anti-cardio lipid antibodies were negative. Patient was unaware of any etiology of the stroke. But he admits to having family history of stroke in early 46s. Patient supposed to be taking daily aspirin and Lipitor due to prior stroke. But he has not been taking those medications for the past 5 months (since December 2018). Patient was evaluated by stroke team and CT head, CTA head and neck were unremarkable. Then he had MRI brain and it was also unremarkable.   Patient was COLONOSCOPY      ENDOSCOPY, COLON, DIAGNOSTIC      EYE SURGERY Right     approximately 1998    LOBECTOMY Left     lower lobe    UPPER GASTROINTESTINAL ENDOSCOPY  09/24/2018    with Dr. Adrián Tomlinson. Biopsies taken.  UPPER GASTROINTESTINAL ENDOSCOPY N/A 9/24/2018    EGD BIOPSY performed by Antonio Park MD at [address] [place] [address] History: Rubia Sarah  reports that he has been smoking cigarettes. He has a 10.00 pack-year smoking history. He has quit using smokeless tobacco. His smokeless tobacco use included chew. He reports that he drinks alcohol. He reports that he does not use drugs. No family history on file. Current Medications:     sodium chloride flush  10 mL Intravenous 2 times per day    enoxaparin  40 mg Subcutaneous Daily    atorvastatin  80 mg Oral Nightly    [START ON 5/13/2019] aspirin  81 mg Oral Daily    [START ON 5/13/2019] clopidogrel  75 mg Oral Daily    amitriptyline  25 mg Oral Nightly     PRN Meds include: sodium chloride flush, magnesium hydroxide, ondansetron, hydrALAZINE, ketorolac, metoclopramide    ROS:   Constitutional Negative for fever and chills   HEENT Negative for ear discharge, ear pain, nosebleed   Eyes Negative for photophobia, pain and discharge   Respiratory Negative for hemoptysis and sputum   Cardiovascular Negative for orthopnea, claudication and PND   Gastrointestinal Negative for abdominal pain, diarrhea, blood in stool   Musculoskeletal Negative for joint pain, negative for myalgia   Skin Negative for rash or itching   Endo/heme/allergies Negative for polydipsia, environmental allergy   Psychiatric Negative for suicidal ideation.   Patient is not anxious           Objective:   /74   Pulse 74   Temp 98.4 °F (36.9 °C) (Oral)   Resp 18   Ht 6' (1.829 m)   Wt 190 lb (86.2 kg)   SpO2 95%   BMI 25.77 kg/m²     Blood pressure range: Systolic (47RVV), CVS:817 , Min:119 , EYO:797   ; Diastolic (34OCB), LMC:15, Min:74, Max:110      Continuous infusions:    sodium chloride 75 mL/hr at 05/12/19 1741       On exam: Blood pressure 119/74, pulse 74, temperature 98.4 °F (36.9 °C), temperature source Oral, resp. rate 18, height 6' (1.829 m), weight 190 lb (86.2 kg), SpO2 95 %. GENERAL  Appears comfortable and in no distress   HEENT  NC/ AT   NECK  Supple and no bruits heard   MENTAL STATUS:  Alert, oriented, intact memory, no confusion, normal speech, normal language, no hallucination or delusion   CRANIAL NERVES: II     -      PERRLA, Fundi reveal intact venous pulsations;  Visual fields intact to confrontation  III,IV,VI -  EOMs full, no afferent defect, no                      ADWOA, no ptosis  V     -     Normal facial sensation  VII    -     Normal facial symmetry  VIII   -     Intact hearing  IX,X -     Symmetrical palate  XI    -     Symmetrical shoulder shrug  XII   -     Midline tongue, no atrophy    MOTOR FUNCTION:  significant for good strength of grade 5/5 in bilateral proximal and distal muscle groups of both upper and lower extremities with normal bulk, normal tone and no involuntary movements, no tremor   SENSORY FUNCTION:  Diminished light touch, PP  in left upper and left lower extremities    CEREBELLAR FUNCTION:  Mild ataxia on finger-nose-finger testing on right side and intact fine motor control over upper limb   REFLEX FUNCTION:  Symmetric, no perverted reflex, no Babinski sign   STATION and GAIT  Normal station, normal gait           Data:    Lab Results:     Lab Results   Component Value Date    CHOL 146 09/16/2018    LDLCHOLESTEROL 90 09/16/2018    HDL 39 (L) 09/16/2018    TRIG 85 09/16/2018    ALT 85 (H) 09/21/2018    AST 42 (H) 09/21/2018    TSH 0.39 09/16/2018    INR 1.0 05/12/2019    LABA1C 4.8 09/16/2018     Hematology:  Recent Labs     05/12/19  1157   WBC 12.9*   HGB 16.7   HCT 49.1   PLT See Reflexed IPF Result   INR 1.0     Chemistry:  Recent Labs     05/12/19  1148 05/12/19  1157   NA  --  142   K  --  4.4   CL  --  104   CO2  --  22   GLUCOSE effects discussed with the patient and he verbalized understanding those instructions. Family history of CVA  Care plan was discussed with the patient and his nurse at bedside and they voiced understanding those instructions. Will follow with you. Thank you for consultation.               Mayra Lopez MD 5/12/2019 6:42 PM

## 2019-05-13 VITALS
HEIGHT: 72 IN | OXYGEN SATURATION: 96 % | RESPIRATION RATE: 14 BRPM | TEMPERATURE: 97.3 F | DIASTOLIC BLOOD PRESSURE: 73 MMHG | HEART RATE: 66 BPM | BODY MASS INDEX: 25.73 KG/M2 | WEIGHT: 190 LBS | SYSTOLIC BLOOD PRESSURE: 113 MMHG

## 2019-05-13 PROBLEM — Z86.73 HISTORY OF CEREBRAL INFARCTION: Status: ACTIVE | Noted: 2019-05-13

## 2019-05-13 PROBLEM — G43.109 COMPLICATED MIGRAINE: Status: ACTIVE | Noted: 2019-05-13

## 2019-05-13 LAB
ABSOLUTE EOS #: 0.28 K/UL (ref 0–0.44)
ABSOLUTE IMMATURE GRANULOCYTE: <0.03 K/UL (ref 0–0.3)
ABSOLUTE LYMPH #: 3.47 K/UL (ref 1.1–3.7)
ABSOLUTE MONO #: 0.6 K/UL (ref 0.1–1.2)
BASOPHILS # BLD: 1 % (ref 0–2)
BASOPHILS ABSOLUTE: 0.05 K/UL (ref 0–0.2)
CHOLESTEROL/HDL RATIO: 3.3
CHOLESTEROL: 117 MG/DL
DIFFERENTIAL TYPE: NORMAL
EKG ATRIAL RATE: 80 BPM
EKG P AXIS: 8 DEGREES
EKG P-R INTERVAL: 134 MS
EKG Q-T INTERVAL: 406 MS
EKG QRS DURATION: 98 MS
EKG QTC CALCULATION (BAZETT): 468 MS
EKG R AXIS: 17 DEGREES
EKG T AXIS: 31 DEGREES
EKG VENTRICULAR RATE: 80 BPM
EOSINOPHILS RELATIVE PERCENT: 4 % (ref 1–4)
HCT VFR BLD CALC: 43.5 % (ref 40.7–50.3)
HDLC SERPL-MCNC: 36 MG/DL
HEMOGLOBIN: 14.2 G/DL (ref 13–17)
IMMATURE GRANULOCYTES: 0 %
LDL CHOLESTEROL: 68 MG/DL (ref 0–130)
LV EF: 60 %
LVEF MODALITY: NORMAL
LYMPHOCYTES # BLD: 43 % (ref 24–43)
MCH RBC QN AUTO: 29.6 PG (ref 25.2–33.5)
MCHC RBC AUTO-ENTMCNC: 32.6 G/DL (ref 28.4–34.8)
MCV RBC AUTO: 90.8 FL (ref 82.6–102.9)
MONOCYTES # BLD: 7 % (ref 3–12)
NRBC AUTOMATED: 0 PER 100 WBC
PDW BLD-RTO: 12.7 % (ref 11.8–14.4)
PLATELET # BLD: 217 K/UL (ref 138–453)
PLATELET ESTIMATE: NORMAL
PMV BLD AUTO: 11.3 FL (ref 8.1–13.5)
RBC # BLD: 4.79 M/UL (ref 4.21–5.77)
RBC # BLD: NORMAL 10*6/UL
SEG NEUTROPHILS: 45 % (ref 36–65)
SEGMENTED NEUTROPHILS ABSOLUTE COUNT: 3.67 K/UL (ref 1.5–8.1)
TRIGL SERPL-MCNC: 66 MG/DL
VLDLC SERPL CALC-MCNC: ABNORMAL MG/DL (ref 1–30)
WBC # BLD: 8.1 K/UL (ref 3.5–11.3)
WBC # BLD: NORMAL 10*3/UL

## 2019-05-13 PROCEDURE — 85025 COMPLETE CBC W/AUTO DIFF WBC: CPT

## 2019-05-13 PROCEDURE — G0378 HOSPITAL OBSERVATION PER HR: HCPCS

## 2019-05-13 PROCEDURE — 93306 TTE W/DOPPLER COMPLETE: CPT

## 2019-05-13 PROCEDURE — 94760 N-INVAS EAR/PLS OXIMETRY 1: CPT

## 2019-05-13 PROCEDURE — 2580000003 HC RX 258: Performed by: STUDENT IN AN ORGANIZED HEALTH CARE EDUCATION/TRAINING PROGRAM

## 2019-05-13 PROCEDURE — 92523 SPEECH SOUND LANG COMPREHEN: CPT

## 2019-05-13 PROCEDURE — 96361 HYDRATE IV INFUSION ADD-ON: CPT

## 2019-05-13 PROCEDURE — 97166 OT EVAL MOD COMPLEX 45 MIN: CPT

## 2019-05-13 PROCEDURE — 6360000002 HC RX W HCPCS: Performed by: STUDENT IN AN ORGANIZED HEALTH CARE EDUCATION/TRAINING PROGRAM

## 2019-05-13 PROCEDURE — 36415 COLL VENOUS BLD VENIPUNCTURE: CPT

## 2019-05-13 PROCEDURE — 97162 PT EVAL MOD COMPLEX 30 MIN: CPT

## 2019-05-13 PROCEDURE — 6370000000 HC RX 637 (ALT 250 FOR IP): Performed by: STUDENT IN AN ORGANIZED HEALTH CARE EDUCATION/TRAINING PROGRAM

## 2019-05-13 PROCEDURE — 85240 CLOT FACTOR VIII AHG 1 STAGE: CPT

## 2019-05-13 PROCEDURE — 96376 TX/PRO/DX INJ SAME DRUG ADON: CPT

## 2019-05-13 PROCEDURE — 96372 THER/PROPH/DIAG INJ SC/IM: CPT

## 2019-05-13 PROCEDURE — 80061 LIPID PANEL: CPT

## 2019-05-13 PROCEDURE — 97530 THERAPEUTIC ACTIVITIES: CPT

## 2019-05-13 PROCEDURE — 97535 SELF CARE MNGMENT TRAINING: CPT

## 2019-05-13 PROCEDURE — 85300 ANTITHROMBIN III ACTIVITY: CPT

## 2019-05-13 PROCEDURE — 99232 SBSQ HOSP IP/OBS MODERATE 35: CPT | Performed by: PSYCHIATRY & NEUROLOGY

## 2019-05-13 RX ORDER — CLOPIDOGREL BISULFATE 75 MG/1
75 TABLET ORAL DAILY
Qty: 30 TABLET | Refills: 3 | Status: CANCELLED | OUTPATIENT
Start: 2019-05-14

## 2019-05-13 RX ORDER — ATORVASTATIN CALCIUM 40 MG/1
40 TABLET, FILM COATED ORAL NIGHTLY
Qty: 30 TABLET | Refills: 3 | Status: SHIPPED | OUTPATIENT
Start: 2019-05-13 | End: 2019-12-26 | Stop reason: SDDI

## 2019-05-13 RX ORDER — NAPROXEN 500 MG/1
500 TABLET ORAL 2 TIMES DAILY PRN
Qty: 180 TABLET | Refills: 1 | Status: SHIPPED | OUTPATIENT
Start: 2019-05-13 | End: 2019-12-26 | Stop reason: SDDI

## 2019-05-13 RX ORDER — AMITRIPTYLINE HYDROCHLORIDE 10 MG/1
10 TABLET, FILM COATED ORAL NIGHTLY
Qty: 30 TABLET | Refills: 3 | Status: SHIPPED | OUTPATIENT
Start: 2019-05-13 | End: 2019-12-26 | Stop reason: SDDI

## 2019-05-13 RX ADMIN — ONDANSETRON 4 MG: 2 INJECTION INTRAMUSCULAR; INTRAVENOUS at 10:50

## 2019-05-13 RX ADMIN — KETOROLAC TROMETHAMINE 15 MG: 15 INJECTION, SOLUTION INTRAMUSCULAR; INTRAVENOUS at 10:36

## 2019-05-13 RX ADMIN — CLOPIDOGREL 75 MG: 75 TABLET, FILM COATED ORAL at 10:35

## 2019-05-13 RX ADMIN — ASPIRIN 81 MG: 81 TABLET ORAL at 10:35

## 2019-05-13 RX ADMIN — ENOXAPARIN SODIUM 40 MG: 40 INJECTION SUBCUTANEOUS at 10:35

## 2019-05-13 RX ADMIN — SODIUM CHLORIDE, PRESERVATIVE FREE 10 ML: 5 INJECTION INTRAVENOUS at 10:41

## 2019-05-13 ASSESSMENT — PAIN DESCRIPTION - LOCATION
LOCATION: HEAD
LOCATION: HEAD

## 2019-05-13 ASSESSMENT — PAIN SCALES - GENERAL
PAINLEVEL_OUTOF10: 0
PAINLEVEL_OUTOF10: 5
PAINLEVEL_OUTOF10: 2
PAINLEVEL_OUTOF10: 3

## 2019-05-13 NOTE — PROGRESS NOTES
Physical Therapy    Facility/Department: [facility] NEURO  Initial Assessment    NAME: Bereket Chandler  : 1980  MRN: [id number]    Date of Service: 2019    Discharge Recommendations:    No further therapy required at discharge. Assessment   Assessment: The pt ambulated well without a device and needs no further PT at this time. Prognosis: Good  Decision Making: Medium Complexity  Patient Education: PT  POC, Goals  REQUIRES PT FOLLOW UP: No  Activity Tolerance  Activity Tolerance: Patient Tolerated treatment well    Patient Diagnosis(es): The primary encounter diagnosis was Paresthesia. A diagnosis of Dizziness was also pertinent to this visit. has a past medical history of Anxiety, Blind right eye, Cerebellar stroke ([facility] [address] Drug use, GERD (gastroesophageal reflux disease), Hepatitis C, Lung collapse, Pneumonia, and Psychiatric problem. has a past surgical history that includes lobectomy (Left); Endoscopy, colon, diagnostic; Colonoscopy; eye surgery (Right); Upper gastrointestinal endoscopy (2018); and Upper gastrointestinal endoscopy (N/A, 2018).   Restrictions  Restrictions/Precautions  Restrictions/Precautions: General Precautions, Fall Risk, Up as Tolerated  Required Braces or Orthoses?: No  Vision/Hearing  Vision: Impaired(R eye blindness)     Subjective  General  Patient assessed for rehabilitation services?: Yes  Response To Previous Treatment: Not applicable  Follows Commands: Within Functional Limits  Pain Screening  Patient Currently in Pain: Yes  Pain Assessment  Pain Assessment: 0-10  Pain Level: 3  Pain Location: Head  Vital Signs  Patient Currently in Pain: Yes     Orientation  Orientation  Overall Orientation Status: Within Normal Limits  Social/Functional History  Social/Functional History  Lives With: Son  Type of Home: Apartment  Home Layout: One level  Home Access: Stairs to enter without rails  Entrance Stairs - Number of Steps: 2 LASHON  Bathroom Shower/Tub: Tub/Shower unit  Bathroom Toilet: Standard  Bathroom Equipment: (none)  Home Equipment: (none)  ADL Assistance: Independent  Homemaking Assistance: Independent  Homemaking Responsibilities: Yes  Meal Prep Responsibility: Primary  Laundry Responsibility: Primary  Cleaning Responsibility: Primary  Shopping Responsibility: Primary  Ambulation Assistance: Independent  Transfer Assistance: Independent  Active : Yes  Mode of Transportation: SUV  Occupation: Part time employment  Type of occupation: cleaning/ waxing floors  Leisure & Hobbies: watching sports-- [employer]   Cognition   Objective     AROM RLE (degrees)  RLE AROM: WNL  AROM LLE (degrees)  LLE AROM : WNL  AROM RUE (degrees)  RUE AROM : WNL  AROM LUE (degrees)  LUE AROM : WNL  Strength RLE  Strength RLE: WNL  Strength LLE  Strength LLE: WNL  Strength RUE  Strength RUE: WNL  Strength LUE  Strength LUE: WNL  Sensation  Overall Sensation Status: Impaired  Additional Comments: Pt reports numbness/ tingling in L side-- since Sept. 2018.  Pt reports increased numbness since yesterday  Bed mobility  Rolling to Left: Stand by assistance  Rolling to Right: Stand by assistance  Supine to Sit: Stand by assistance  Sit to Supine: Stand by assistance  Scooting: Stand by assistance  Transfers  Sit to Stand: Stand by assistance  Stand to sit: Stand by assistance  Ambulation  Ambulation?: Yes  Ambulation 1  Surface: level tile  Device: No Device  Assistance: Stand by assistance  Distance: amb 300 ft without a device x SBA  Stairs/Curb  Stairs?: Yes  Stairs  # Steps : 5  Stairs Height: 6\"  Rails: Left ascending  Assistance: Stand by assistance     Balance  Posture: Good  Sitting - Static: Good  Sitting - Dynamic: Good  Standing - Static: Good  Standing - Dynamic: Good      Plan   Plan  Times per week: DC  PT  Safety Devices  Type of devices: Call light within reach, Left in bed  G-Code     OutComes Score              AM-PAC Score  AM-PAC Inpatient Mobility Raw Score : 24  AM-PAC Inpatient T-Scale Score : 61.14  Mobility Inpatient CMS 0-100% Score: 0  Mobility Inpatient CMS G-Code Modifier : CH     Goals  Short term goals  Time Frame for Short term goals: No PT needs at this time   DC  PT     Therapy Time   Individual Concurrent Group Co-treatment   Time In 0810         Time Out 0833         Minutes Lizz [address] Ramiro Juan, PT

## 2019-05-13 NOTE — PLAN OF CARE
Bed low and locked in place, call light in reach, side rails up x 2, non skid socks in place  Assess pain level, administer med as per order, re-assess for effectiveness

## 2019-05-13 NOTE — PROGRESS NOTES
Speech Language Pathology  Facility/Department: [facility]  Initial Speech/Language/Cognitive Assessment    NAME: Lona Xiao  : 1980   MRN: [id number]  ADMISSION DATE: 2019  ADMITTING DIAGNOSIS: has Pneumothorax; Cerebellar stroke ([facility] [address] Cerebellar infarct ([facility] [address] Bradycardia; Nausea and vomiting; Abdominal pain; Dizziness; TIA (transient ischemic attack); Complicated migraine; and History of cerebral infarction on their problem list.    Date of Eval: 2019   Evaluating Therapist: Leopold Gentle, SLP    Primary Complaint: Lona Xiao is a 34 y.o. male who presents with less than a headache behind left eye, dizziness, unstable gait, and left upper and lower extremity numbness. Patient has a history of cerebellar infarct in September. He states at that time he presented with similar symptoms. He had a headache dizziness and a steady gait. He has not had any other headache since then. Patient did not have any residual numbness after this. He states that approximately 30 minutes ago the headache started all of a sudden and then he developed the numbness in left extremities and is still present. He is blind in the right eye, but does not have any other new vision changes. Denies any confusion. Does state he feels extremely dizzy at this time. Patient also has a new rash, and states that he stated a friend's house and thinks it may be bites. No shortness of breath or chest pain. Patient is on aspirin. Non-anticoagulation. Pain:  Pain Assessment  Pain Assessment: 0-10  Pain Level: 0    Assessment:  Pt. Presents mild-moderate cognitive deficits characterized by difficulty with verbal sequencing and immediate recall of 5 units. Pt. States he would have done better with recall task prior to admission. No oral motor deficits, no dysarthria noted. ST to follow up to address noted deficits. Education provided.             Recommendations:  Requires SLP Intervention: Yes  Duration/Frequency of Treatment: 3-5 x week   D/C Recommendations: Home independently       Plan:   Goals:  Short-term Goals  Goal 2: Pt. will recall 4-5 units without distractions with 90% accuracy. Goal 3: Pt. will utilize memory compensatory strategies to aid in recall. Goal 4: Pt. will complete 4-6 step verbal sequencing tasks with 90% accuracy. Patient/family involved in developing goals and treatment plan: yes    Subjective:  General  Chart Reviewed: Yes  Family / Caregiver Present: Yes(mom)  Social/Functional History  Lives With: Family  Active : Yes  Occupation: Part time employment  Type of occupation: Cleans floors               Objective:     Oral/Motor  Oral Motor: Within functional limits      Expression  Primary Mode of Expression: Verbal      Motor Speech  Motor Speech: Within Functional Limits         Cognition:      Orientation  Overall Orientation Status: Within Normal Limits  Attention  Attention: Within Functional Limits  Memory  Memory: Exceptions to [facility]  Immediate Memory: Mild(3/3, 3/5, 3/3 )  Problem Solving  Problem Solving: Within Functional Limits  Abstract Reasoning  Abstract Reasoning: Within Functional Limits  Safety/Judgement  Safety/Judgement: Within Functional Limits  Word Associations: Within Functional Limits  Verbal Sequencing: Moderate (2/4)    Prognosis:  Speech Therapy Prognosis  Prognosis: Good  Individuals consulted  Consulted and agree with results and recommendations: Patient; Family member  Family member consulted: mom    Education:  Patient Education: yes  Patient Education Response: Verbalizes understanding          Therapy Time:   Individual Concurrent Group Co-treatment   Time In 3746         Time Out 3091         Minutes Yessi Bhatti M.A. CCC-SLP  5/13/2019 1:08 PM

## 2019-05-13 NOTE — DISCHARGE SUMMARY
Pt has been discharged. Nurse reviewed discharged plan with pt and mother. Both verbalized understanding. Pt is waiting on escort services.

## 2019-05-13 NOTE — PROGRESS NOTES
NEUROLOGY INPATIENT PROGRESS NOTE    5/13/2019         Subjective: Martha Ta is a  34 y.o. male admitted on 5/12/2019 with TIA (transient ischemic attack) [G45.9]  TIA (transient ischemic attack) [G45.9]    Briefly, this is a  34 y.o. male admitted on 5/12/2019 with dizziness, numbness, headache along with left-sided facial and upper and lower extremity numbness and tingling    Today Pt  is alert and oriented, answering all the questions   Afebrile, VSS  Patient stated that his headache as well as dizziness has resolved  Still complaining of mild numbness and tingling in her left upper and lower extremities  Leukocytosis has resolved   Pt is able to walk to washroom without any dizziness     No current facility-administered medications on file prior to encounter. Current Outpatient Medications on File Prior to Encounter   Medication Sig Dispense Refill    amitriptyline (ELAVIL) 25 MG tablet 25mg QHS after 7 days of 10mg QHS 30 tablet 3    amitriptyline (ELAVIL) 10 MG tablet Take 1 tablet by mouth nightly 7 tablet 0    ondansetron (ZOFRAN) 4 MG tablet Take 1 tablet by mouth every 6 hours as needed for Nausea or Vomiting 20 tablet 0    acetaminophen (TYLENOL) 325 MG tablet Take 325 mg by mouth daily as needed for Pain      calcium carbonate (TUMS) 500 MG chewable tablet Take 1 tablet by mouth daily as needed for Heartburn      aspirin 81 MG EC tablet Take 1 tablet by mouth daily 30 tablet 3    atorvastatin (LIPITOR) 40 MG tablet Take 1 tablet by mouth nightly 30 tablet 3    pantoprazole (PROTONIX) 40 MG tablet Take 1 tablet by mouth every morning (before breakfast) 30 tablet 3    bisacodyl (DULCOLAX) 5 MG EC tablet Take 5 mg by mouth daily as needed for Constipation      folic acid (FOLVITE) 1 MG tablet Take 1 tablet by mouth daily 30 tablet 3       Allergies: Martha Ta has No Known Allergies.     Past Medical History:   Diagnosis Date    Anxiety     Blind right eye     Cerebellar stroke ([facility] [address] 9/16/2018    Drug use     cocaine    GERD (gastroesophageal reflux disease)     Hepatitis C     Lung collapse     Pneumonia     Psychiatric problem        Past Surgical History:   Procedure Laterality Date    COLONOSCOPY      ENDOSCOPY, COLON, DIAGNOSTIC      EYE SURGERY Right     approximately 1998    LOBECTOMY Left     lower lobe    UPPER GASTROINTESTINAL ENDOSCOPY  09/24/2018    with Dr. Whitney Dunn. Biopsies taken.     UPPER GASTROINTESTINAL ENDOSCOPY N/A 9/24/2018    EGD BIOPSY performed by Annmarie Garcia [facility] at [facility] OR       Medications:     sodium chloride flush  10 mL Intravenous 2 times per day    enoxaparin  40 mg Subcutaneous Daily    atorvastatin  80 mg Oral Nightly    aspirin  81 mg Oral Daily    clopidogrel  75 mg Oral Daily    amitriptyline  10 mg Oral Nightly     PRN Meds include: sodium chloride flush, magnesium hydroxide, ondansetron, hydrALAZINE, ketorolac, metoclopramide    Objective:   /73   Pulse 66   Temp 97.3 °F (36.3 °C) (Oral)   Resp 14   Ht 6' (1.829 m)   Wt 190 lb (86.2 kg)   SpO2 97%   BMI 25.77 kg/m²     Blood pressure range: Systolic (81QWO), RPK:545 , Min:107 , XFD:169   ; Diastolic (44XUE), WTI:35, Min:64, Max:110      ROS:  CONSTITUTIONAL: negative for fatigue and malaise   EYES: negative for double vision and photophobia    HEENT: negative for tinnitus and sore throat   RESPIRATORY: negative for cough, shortness of breath   CARDIOVASCULAR: negative for chest pain, palpitations, or syncope   GASTROINTESTINAL: negative for abdominal pain, nausea, vomiting, diarrhea, or constipation    GENITOURINARY: negative for incontinence or retention    MUSCULOSKELETAL: negative for neck or back pain, negative for extremity pain   NEUROLOGICAL: Positive for numbness and tingling in left upper and lower extremity    PSYCHIATRIC: negative for agitation, hallucination, SI/HI   SKIN Negative for spontaneous contusions, rashes, or lesions        NEUROLOGIC EXAMINATION  GENERAL No results found for: PHENYTOIN, PHENYTOIN, VALPROATE, CBMZ    IMAGING    CT head 5/12/19: No acute intracranial abnormality.     CTA head and neck 5/12/19: No hemodynamically significant stenosis or branch occlusion in the cervical or intracranial arterial circulation.     MRI Brain (5/12/19): 1. No acute intracranial abnormality. 2. Inferior left cerebellar encephalomalacia with mild surrounding gliosis in  keeping with sequela of prior infarct. ASSESSMENT and PLAN  1. 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 Intractable migraine headache without aura  Continue IV Toradol 15 mg every 8 hours for 3 doses  Continue IV taken every 8 hours when necessary  Resumed his amitriptyline 10  Mg    3.  Hx  Of smoking  Counseled on smoking  Started on nicotine patch    DVT prophylaxis- Lovenox

## 2019-05-13 NOTE — PLAN OF CARE
Problem: Safety/Fall Precautions  Goal: Patient will remain free of falls  5/13/2019 0856 by Ruddy Gordon RN  Outcome: Ongoing  5/12/2019 2354 by Sydnie Samuel RN  Outcome: Ongoing  5/12/2019 1906 by Claudy Hansen RN  Outcome: Ongoing     Problem: Pain - Acute:  Goal: Pain control  Description  Patient will demonstrate personal actions to control pain.      5/13/2019 0856 by Ruddy Gordon RN  Outcome: Ongoing  5/12/2019 2354 by Sydnie Samuel RN  Outcome: Ongoing  5/12/2019 1906 by Claudy Hansen RN  Outcome: Ongoing

## 2019-05-13 NOTE — PROGRESS NOTES
Smoking Cessation - topics covered   [x]  Health Risks  [x]  Benefits of Quitting   [x]  Smoking Cessation  []  Patient has no history of tobacco use  []  Patient is former smoker. []  No need for tobacco cessation education. [x]  Booklet given  [x]  Patient verbalizes understanding. []  Patient denies need for tobacco cessation education. []  Unable to meet with patient today. Will follow up as able.   Marium Abreu  10:53 AM

## 2019-05-13 NOTE — PROGRESS NOTES
Occupational Therapy   Occupational Therapy Initial Assessment  Date: 2019   Patient Name: Vy Hartman  MRN: [id number]     : 1980    Chief Complaint   Patient presents with    Extremity Weakness    Headache     Date of Service: 2019    Discharge Recommendations:    No therapy recommended at discharge. OT Equipment Recommendations  Equipment Needed: No    Assessment   Assessment: Pt discharged from OT services at this time. Pt IND and at baseline functional level for ADL/ functional activities. Pt evaluation and discharged from acute care OT. Decision Making: Medium Complexity  Patient Education: OT POC, safety   REQUIRES OT FOLLOW UP: No  Activity Tolerance  Activity Tolerance: Patient Tolerated treatment well  Safety Devices  Safety Devices in place: Yes  Type of devices: Left in bed;Nurse notified;Gait belt;Call light within reach  Restraints  Initially in place: No         Patient Diagnosis(es): The primary encounter diagnosis was Paresthesia. A diagnosis of Dizziness was also pertinent to this visit. has a past medical history of Anxiety, Blind right eye, Cerebellar stroke ([address] Drug use, GERD (gastroesophageal reflux disease), Hepatitis C, Lung collapse, Pneumonia, and Psychiatric problem. has a past surgical history that includes lobectomy (Left); Endoscopy, colon, diagnostic; Colonoscopy; eye surgery (Right); Upper gastrointestinal endoscopy (2018); and Upper gastrointestinal endoscopy (N/A, 2018).       Restrictions  Restrictions/Precautions  Restrictions/Precautions: General Precautions, Fall Risk, Up as Tolerated  Required Braces or Orthoses?: No    Subjective   General  Patient assessed for rehabilitation services?: Yes  Family / Caregiver Present: No  Pain Assessment  Pain Assessment: 0-10  Pain Level: 5  Pain Location: Head  Oxygen Therapy  SpO2: 96 %     Social/Functional History  Social/Functional History  Lives With: Son  Type of Home: Apartment  Home Layout: One level  Home Access: Stairs to enter without rails  Entrance Stairs - Number of Steps: 2 LASHON  Bathroom Shower/Tub: Tub/Shower unit  Bathroom Toilet: Standard  Bathroom Equipment: (none)  Home Equipment: (none)  ADL Assistance: Independent  Homemaking Assistance: Independent  Homemaking Responsibilities: Yes  Meal Prep Responsibility: Primary  Laundry Responsibility: Primary  Cleaning Responsibility: Primary  Shopping Responsibility: Primary  Ambulation Assistance: Independent  Transfer Assistance: Independent  Active : Yes  Mode of Transportation: [facility]  Occupation: Part time employment  Type of occupation: cleaning/ waxing floors  [address]: watching sports-- [employer]     Objective   Hearing: Within functional limits    Orientation  Overall Orientation Status: Within Functional Limits  Observation/Palpation  Posture: Good  Balance  Sitting Balance: Independent(seated EOB)  Standing Balance: Independent(no device)  Functional Mobility  Functional - Mobility Device: No device  Activity: Other  Assist Level: Independent  Functional Mobility Comments: Pt demo no LOB or SOB during session, pt reports minimal dizziness that does not worsen with activity      ADL  Feeding: Independent  Grooming: Independent  UE Bathing: Independent  LE Bathing: Independent  UE Dressing: Independent(to manage gown )  LE Dressing: Independent(to don/ doff socks seated EOB)  Toileting: Independent  Additional Comments: Pt supine in bed on arrival. Pt completed bed mobility and sat at EOB. Pt completed sit > stand transfer and functional mobility into gruber. Pt reports minimal dizziness that did not worsen with increased activity. Pt demo no LOB or unsteadiness throughout session. Pt returned to bed, call light in reach and RN notified on therapist exit.    Tone RUE  RUE Tone: Normotonic  Tone LUE  LUE Tone: Normotonic  Coordination  Movements Are Fluid And Coordinated: Yes     Bed mobility  Rolling to Left: Independent  Rolling to Right: Independent  Supine to Sit: Independent  Sit to Supine: Independent  Scooting: Independent     Transfers  Stand Step Transfers: Independent  Sit to stand: Independent  Stand to sit: Independent  Transfer Comments: Pt demo no LOB or SOB during session     Cognition  Overall Cognitive Status: WFL     Perception  Overall Perceptual Status: WFL     Sensation  Overall Sensation Status: Impaired  Additional Comments: Pt reports numbness/ tingling in L side-- since Sept. 2018. Pt reports increased numbness since yesterday    LUE AROM (degrees)  LUE AROM : WFL  RUE AROM (degrees)  RUE AROM : WFL  LUE Strength  Gross LUE Strength: WFL  RUE Strength  Gross RUE Strength: WFL     Plan   Plan  Times per week: Pt discharged from OT services at this time. Pt IND and at baseline functional level for ADL/ functional activities. Pt evaluation and discharged from acute care OT. AM-PAC Score  AM-PAC Inpatient Daily Activity Raw Score: 24  AM-PAC Inpatient ADL T-Scale Score : 57.54  ADL Inpatient CMS 0-100% Score: 0  ADL Inpatient CMS G-Code Modifier : CH    Goals  Short term goals  Time Frame for Short term goals: Pt discharged from OT services at this time. Pt IND and at baseline functional level for ADL/ functional activities. Pt evaluation and discharged from acute care OT. Therapy Time   Individual Concurrent Group Co-treatment   Time In 0900         Time Out 0923         Minutes 23          See above for LOF. RN reports patient is medically stable for therapy treatment this date. Chart reviewed prior to treatment and patient is agreeable for therapy. All lines intact and patient positioned comfortably at end of treatment. All patient needs addressed prior to ending therapy session.          Mikey Perkins OTR/L

## 2019-05-14 LAB
AT-III ACTIVITY: 117 % (ref 83–122)
FACTOR VIII ACTIVITY: 98 % (ref 50–150)
PROTEIN C ACTIVITY: 106 %
PROTEIN S ACTIVITY: 82 % (ref 77–116)

## 2019-05-16 LAB
FACTOR V LEIDEN MUTATION: NORMAL
PROTHROMBIN G20210A MUTATION: NORMAL

## 2019-12-26 ENCOUNTER — APPOINTMENT (OUTPATIENT)
Dept: GENERAL RADIOLOGY | Age: 39
End: 2019-12-26

## 2019-12-26 ENCOUNTER — HOSPITAL ENCOUNTER (EMERGENCY)
Age: 39
Discharge: HOME | End: 2019-12-26
Attending: EMERGENCY MEDICINE

## 2019-12-26 ENCOUNTER — APPOINTMENT (OUTPATIENT)
Dept: CT IMAGING | Age: 39
End: 2019-12-26

## 2019-12-26 ENCOUNTER — APPOINTMENT (OUTPATIENT)
Dept: MRI IMAGING | Age: 39
End: 2019-12-26

## 2019-12-26 VITALS
SYSTOLIC BLOOD PRESSURE: 112 MMHG | TEMPERATURE: 98 F | WEIGHT: 190 LBS | RESPIRATION RATE: 26 BRPM | HEIGHT: 72 IN | HEART RATE: 76 BPM | OXYGEN SATURATION: 97 % | DIASTOLIC BLOOD PRESSURE: 81 MMHG | BODY MASS INDEX: 25.73 KG/M2

## 2019-12-26 DIAGNOSIS — R20.2 PARESTHESIA: Primary | ICD-10-CM

## 2019-12-26 LAB
ABSOLUTE EOS #: 0.31 K/UL (ref 0–0.44)
ABSOLUTE IMMATURE GRANULOCYTE: 0.04 K/UL (ref 0–0.3)
ABSOLUTE LYMPH #: 2.43 K/UL (ref 1.1–3.7)
ABSOLUTE MONO #: 0.86 K/UL (ref 0.1–1.2)
ANION GAP SERPL CALCULATED.3IONS-SCNC: 7 MMOL/L (ref 9–17)
BASOPHILS # BLD: 1 % (ref 0–2)
BASOPHILS ABSOLUTE: 0.07 K/UL (ref 0–0.2)
BUN BLDV-MCNC: 31 MG/DL (ref 6–20)
BUN/CREAT BLD: 29 (ref 9–20)
CALCIUM SERPL-MCNC: 9.3 MG/DL (ref 8.6–10.4)
CHLORIDE BLD-SCNC: 106 MMOL/L (ref 98–107)
CO2: 30 MMOL/L (ref 20–31)
CREAT SERPL-MCNC: 1.07 MG/DL (ref 0.7–1.2)
DIFFERENTIAL TYPE: NORMAL
EKG ATRIAL RATE: 66 BPM
EKG P AXIS: 38 DEGREES
EKG P-R INTERVAL: 130 MS
EKG Q-T INTERVAL: 426 MS
EKG QRS DURATION: 90 MS
EKG QTC CALCULATION (BAZETT): 446 MS
EKG R AXIS: 49 DEGREES
EKG T AXIS: 45 DEGREES
EKG VENTRICULAR RATE: 66 BPM
EOSINOPHILS RELATIVE PERCENT: 3 % (ref 1–4)
ETHANOL PERCENT: <0.01 %
ETHANOL: <10 MG/DL
GFR AFRICAN AMERICAN: >60 ML/MIN
GFR NON-AFRICAN AMERICAN: >60 ML/MIN
GFR SERPL CREATININE-BSD FRML MDRD: ABNORMAL ML/MIN/{1.73_M2}
GFR SERPL CREATININE-BSD FRML MDRD: ABNORMAL ML/MIN/{1.73_M2}
GLUCOSE BLD-MCNC: 85 MG/DL (ref 70–99)
GLUCOSE BLD-MCNC: 88 MG/DL (ref 75–110)
HCT VFR BLD CALC: 49.3 % (ref 40.7–50.3)
HEMOGLOBIN: 16.3 G/DL (ref 13–17)
IMMATURE GRANULOCYTES: 0 %
INR BLD: 1
LYMPHOCYTES # BLD: 25 % (ref 24–43)
MCH RBC QN AUTO: 30.8 PG (ref 25.2–33.5)
MCHC RBC AUTO-ENTMCNC: 33.1 G/DL (ref 28.4–34.8)
MCV RBC AUTO: 93.2 FL (ref 82.6–102.9)
MONOCYTES # BLD: 9 % (ref 3–12)
NRBC AUTOMATED: 0 PER 100 WBC
PARTIAL THROMBOPLASTIN TIME: 28.8 SEC (ref 23–31)
PDW BLD-RTO: 12.6 % (ref 11.8–14.4)
PLATELET # BLD: 245 K/UL (ref 138–453)
PLATELET ESTIMATE: NORMAL
PMV BLD AUTO: 10.6 FL (ref 8.1–13.5)
POTASSIUM SERPL-SCNC: 4 MMOL/L (ref 3.7–5.3)
PROTHROMBIN TIME: 10.4 SEC (ref 9.7–11.6)
RBC # BLD: 5.29 M/UL (ref 4.21–5.77)
RBC # BLD: NORMAL 10*6/UL
SEG NEUTROPHILS: 62 % (ref 36–65)
SEGMENTED NEUTROPHILS ABSOLUTE COUNT: 6.02 K/UL (ref 1.5–8.1)
SODIUM BLD-SCNC: 143 MMOL/L (ref 135–144)
WBC # BLD: 9.7 K/UL (ref 3.5–11.3)
WBC # BLD: NORMAL 10*3/UL

## 2019-12-26 PROCEDURE — 93010 ELECTROCARDIOGRAM REPORT: CPT | Performed by: INTERNAL MEDICINE

## 2019-12-26 PROCEDURE — 85610 PROTHROMBIN TIME: CPT

## 2019-12-26 PROCEDURE — 70551 MRI BRAIN STEM W/O DYE: CPT

## 2019-12-26 PROCEDURE — 99285 EMERGENCY DEPT VISIT HI MDM: CPT

## 2019-12-26 PROCEDURE — 99497 ADVNCD CARE PLAN 30 MIN: CPT | Performed by: PSYCHIATRY & NEUROLOGY

## 2019-12-26 PROCEDURE — 85025 COMPLETE CBC W/AUTO DIFF WBC: CPT

## 2019-12-26 PROCEDURE — 82947 ASSAY GLUCOSE BLOOD QUANT: CPT

## 2019-12-26 PROCEDURE — 85730 THROMBOPLASTIN TIME PARTIAL: CPT

## 2019-12-26 PROCEDURE — 70450 CT HEAD/BRAIN W/O DYE: CPT

## 2019-12-26 PROCEDURE — 93005 ELECTROCARDIOGRAM TRACING: CPT | Performed by: EMERGENCY MEDICINE

## 2019-12-26 PROCEDURE — 71045 X-RAY EXAM CHEST 1 VIEW: CPT

## 2019-12-26 PROCEDURE — 80048 BASIC METABOLIC PNL TOTAL CA: CPT

## 2019-12-26 PROCEDURE — G0480 DRUG TEST DEF 1-7 CLASSES: HCPCS

## 2019-12-26 PROCEDURE — 6370000000 HC RX 637 (ALT 250 FOR IP): Performed by: EMERGENCY MEDICINE

## 2019-12-26 RX ORDER — CLOPIDOGREL BISULFATE 75 MG/1
300 TABLET ORAL ONCE
Status: COMPLETED | OUTPATIENT
Start: 2019-12-26 | End: 2019-12-26

## 2019-12-26 RX ORDER — CLOPIDOGREL BISULFATE 75 MG/1
75 TABLET ORAL DAILY
Qty: 30 TABLET | Refills: 0 | Status: SHIPPED | OUTPATIENT
Start: 2019-12-26 | End: 2020-06-13 | Stop reason: ALTCHOICE

## 2019-12-26 RX ORDER — ASPIRIN 81 MG/1
324 TABLET, CHEWABLE ORAL ONCE
Status: COMPLETED | OUTPATIENT
Start: 2019-12-26 | End: 2019-12-26

## 2019-12-26 RX ORDER — CLOPIDOGREL BISULFATE 75 MG/1
75 TABLET ORAL ONCE
Status: DISCONTINUED | OUTPATIENT
Start: 2019-12-26 | End: 2019-12-26

## 2019-12-26 RX ADMIN — ASPIRIN 81 MG 324 MG: 81 TABLET ORAL at 08:17

## 2019-12-26 RX ADMIN — CLOPIDOGREL BISULFATE 300 MG: 75 TABLET ORAL at 08:17

## 2019-12-26 ASSESSMENT — ENCOUNTER SYMPTOMS
SHORTNESS OF BREATH: 0
EYE REDNESS: 0
DIARRHEA: 0
COLOR CHANGE: 0
VOMITING: 0
EYE DISCHARGE: 0
FACIAL SWELLING: 0
COUGH: 0
ABDOMINAL PAIN: 0
CONSTIPATION: 0

## 2019-12-26 ASSESSMENT — PAIN DESCRIPTION - ORIENTATION: ORIENTATION: RIGHT

## 2019-12-26 ASSESSMENT — PAIN DESCRIPTION - PAIN TYPE: TYPE: ACUTE PAIN

## 2019-12-26 ASSESSMENT — PAIN DESCRIPTION - DESCRIPTORS: DESCRIPTORS: TINGLING

## 2019-12-26 ASSESSMENT — PAIN DESCRIPTION - LOCATION: LOCATION: GENERALIZED

## 2019-12-26 ASSESSMENT — PAIN SCALES - GENERAL: PAINLEVEL_OUTOF10: 10

## 2020-06-13 ENCOUNTER — HOSPITAL ENCOUNTER (EMERGENCY)
Age: 40
Discharge: HOME | End: 2020-06-13
Attending: EMERGENCY MEDICINE
Payer: MEDICAID

## 2020-06-13 ENCOUNTER — APPOINTMENT (OUTPATIENT)
Dept: GENERAL RADIOLOGY | Age: 40
End: 2020-06-13
Payer: MEDICAID

## 2020-06-13 VITALS
RESPIRATION RATE: 16 BRPM | HEIGHT: 72 IN | OXYGEN SATURATION: 98 % | WEIGHT: 175 LBS | DIASTOLIC BLOOD PRESSURE: 66 MMHG | HEART RATE: 80 BPM | SYSTOLIC BLOOD PRESSURE: 125 MMHG | TEMPERATURE: 97.9 F | BODY MASS INDEX: 23.7 KG/M2

## 2020-06-13 LAB
ABSOLUTE EOS #: 0.2 K/UL (ref 0–0.44)
ABSOLUTE IMMATURE GRANULOCYTE: 0.02 K/UL (ref 0–0.3)
ABSOLUTE LYMPH #: 1.99 K/UL (ref 1.1–3.7)
ABSOLUTE MONO #: 0.62 K/UL (ref 0.1–1.2)
ANION GAP SERPL CALCULATED.3IONS-SCNC: 11 MMOL/L (ref 9–17)
BASOPHILS # BLD: 1 % (ref 0–2)
BASOPHILS ABSOLUTE: 0.05 K/UL (ref 0–0.2)
BUN BLDV-MCNC: 13 MG/DL (ref 6–20)
BUN/CREAT BLD: 17 (ref 9–20)
CALCIUM SERPL-MCNC: 9 MG/DL (ref 8.6–10.4)
CHLORIDE BLD-SCNC: 105 MMOL/L (ref 98–107)
CO2: 28 MMOL/L (ref 20–31)
CREAT SERPL-MCNC: 0.76 MG/DL (ref 0.7–1.2)
DIFFERENTIAL TYPE: NORMAL
EOSINOPHILS RELATIVE PERCENT: 3 % (ref 1–4)
GFR AFRICAN AMERICAN: >60 ML/MIN
GFR NON-AFRICAN AMERICAN: >60 ML/MIN
GFR SERPL CREATININE-BSD FRML MDRD: ABNORMAL ML/MIN/{1.73_M2}
GFR SERPL CREATININE-BSD FRML MDRD: ABNORMAL ML/MIN/{1.73_M2}
GLUCOSE BLD-MCNC: 100 MG/DL (ref 70–99)
HCT VFR BLD CALC: 41.5 % (ref 40.7–50.3)
HEMOGLOBIN: 13.7 G/DL (ref 13–17)
IMMATURE GRANULOCYTES: 0 %
LYMPHOCYTES # BLD: 25 % (ref 24–43)
MCH RBC QN AUTO: 30.8 PG (ref 25.2–33.5)
MCHC RBC AUTO-ENTMCNC: 33 G/DL (ref 28.4–34.8)
MCV RBC AUTO: 93.3 FL (ref 82.6–102.9)
MONOCYTES # BLD: 8 % (ref 3–12)
NRBC AUTOMATED: 0 PER 100 WBC
PDW BLD-RTO: 12.6 % (ref 11.8–14.4)
PLATELET # BLD: 243 K/UL (ref 138–453)
PLATELET ESTIMATE: NORMAL
PMV BLD AUTO: 10.8 FL (ref 8.1–13.5)
POTASSIUM SERPL-SCNC: 4.4 MMOL/L (ref 3.7–5.3)
RBC # BLD: 4.45 M/UL (ref 4.21–5.77)
RBC # BLD: NORMAL 10*6/UL
SEDIMENTATION RATE, ERYTHROCYTE: 1 MM (ref 0–15)
SEG NEUTROPHILS: 63 % (ref 36–65)
SEGMENTED NEUTROPHILS ABSOLUTE COUNT: 5.07 K/UL (ref 1.5–8.1)
SODIUM BLD-SCNC: 144 MMOL/L (ref 135–144)
WBC # BLD: 8 K/UL (ref 3.5–11.3)
WBC # BLD: NORMAL 10*3/UL

## 2020-06-13 PROCEDURE — 85025 COMPLETE CBC W/AUTO DIFF WBC: CPT

## 2020-06-13 PROCEDURE — 6370000000 HC RX 637 (ALT 250 FOR IP): Performed by: NURSE PRACTITIONER

## 2020-06-13 PROCEDURE — 86140 C-REACTIVE PROTEIN: CPT

## 2020-06-13 PROCEDURE — 99284 EMERGENCY DEPT VISIT MOD MDM: CPT

## 2020-06-13 PROCEDURE — 2500000003 HC RX 250 WO HCPCS: Performed by: EMERGENCY MEDICINE

## 2020-06-13 PROCEDURE — 89051 BODY FLUID CELL COUNT: CPT

## 2020-06-13 PROCEDURE — 80048 BASIC METABOLIC PNL TOTAL CA: CPT

## 2020-06-13 PROCEDURE — 85651 RBC SED RATE NONAUTOMATED: CPT

## 2020-06-13 PROCEDURE — 73562 X-RAY EXAM OF KNEE 3: CPT

## 2020-06-13 RX ORDER — HYDROCODONE BITARTRATE AND ACETAMINOPHEN 5; 325 MG/1; MG/1
2 TABLET ORAL ONCE
Status: COMPLETED | OUTPATIENT
Start: 2020-06-13 | End: 2020-06-13

## 2020-06-13 RX ORDER — IBUPROFEN 800 MG/1
800 TABLET ORAL EVERY 6 HOURS PRN
Qty: 21 TABLET | Refills: 0 | Status: SHIPPED | OUTPATIENT
Start: 2020-06-13

## 2020-06-13 RX ORDER — LIDOCAINE HYDROCHLORIDE 10 MG/ML
5 INJECTION, SOLUTION INFILTRATION; PERINEURAL ONCE
Status: COMPLETED | OUTPATIENT
Start: 2020-06-13 | End: 2020-06-13

## 2020-06-13 RX ORDER — HYDROCODONE BITARTRATE AND ACETAMINOPHEN 5; 325 MG/1; MG/1
1 TABLET ORAL EVERY 6 HOURS PRN
Qty: 8 TABLET | Refills: 0 | Status: SHIPPED | OUTPATIENT
Start: 2020-06-13 | End: 2020-06-15

## 2020-06-13 RX ADMIN — LIDOCAINE HYDROCHLORIDE 5 ML: 10 INJECTION, SOLUTION INFILTRATION; PERINEURAL at 21:45

## 2020-06-13 RX ADMIN — HYDROCODONE BITARTRATE AND ACETAMINOPHEN 2 TABLET: 5; 325 TABLET ORAL at 18:50

## 2020-06-13 ASSESSMENT — ENCOUNTER SYMPTOMS
COLOR CHANGE: 0
CONSTIPATION: 0
COUGH: 0
DIARRHEA: 0
RHINORRHEA: 0
WHEEZING: 0
ABDOMINAL PAIN: 0
SORE THROAT: 0
SINUS PRESSURE: 0
NAUSEA: 0
VOMITING: 0
SHORTNESS OF BREATH: 0

## 2020-06-13 ASSESSMENT — PAIN DESCRIPTION - ORIENTATION: ORIENTATION: RIGHT

## 2020-06-13 ASSESSMENT — PAIN SCALES - GENERAL
PAINLEVEL_OUTOF10: 5
PAINLEVEL_OUTOF10: 5
PAINLEVEL_OUTOF10: 8

## 2020-06-13 ASSESSMENT — PAIN DESCRIPTION - PAIN TYPE: TYPE: ACUTE PAIN

## 2020-06-13 ASSESSMENT — PAIN DESCRIPTION - LOCATION: LOCATION: KNEE

## 2020-06-13 NOTE — ED NOTES
Pt. States he fell Tuesday after drinking, landing on his knees. Swelling noted to knee. Denies fever.       Luh Sena, RN  06/13/20 0142

## 2020-06-13 NOTE — ED PROVIDER NOTES
[facility] ED  eMERGENCY dEPARTMENT eNCOUnter      Pt Name: Marisel Cobb  MRN: [id number]  [place] 1980  Date of evaluation: 6/13/2020  Provider: Teresa Mathew NP, APRN - [id number]       Chief Complaint   Patient presents with    Knee Pain     fall x3 days ago    Joint Swelling     right knee         HISTORY OF PRESENT ILLNESS  (Location/Symptom, Timing/Onset, Context/Setting, Quality, Duration, Modifying Factors, Severity.)   Marisel Cobb is a 37 y.o. male who presents to the emergency department by private vehicle for evaluation of knee pain. The patient states that he tripped and lost his balance and fell. He states that he fell both knees. He states that he has had pain and swelling to the right knee. States that he has been icing and elevating the knee but the amount of swelling and pain has persisted. He rates his pain an 8 on a 0-to-10 scale. Pain is exacerbated with movement. Nursing Notes were reviewed. ALLERGIES     Patient has no known allergies. CURRENT MEDICATIONS       Discharge Medication List as of 6/13/2020  7:17 PM          PAST MEDICAL HISTORY         Diagnosis Date    Anxiety     Blind right eye     Cerebellar stroke ([facility] [address] 9/16/2018    Drug use     cocaine    GERD (gastroesophageal reflux disease)     Hepatitis C     Lung collapse     Pneumonia     Psychiatric problem        SURGICAL HISTORY           Procedure Laterality Date    COLONOSCOPY      ENDOSCOPY, COLON, DIAGNOSTIC      EYE SURGERY Right     approximately 1998    LOBECTOMY Left     lower lobe    UPPER GASTROINTESTINAL ENDOSCOPY  09/24/2018    with Dr. Carolyn Hobson. Biopsies taken.  UPPER GASTROINTESTINAL ENDOSCOPY N/A 9/24/2018    EGD BIOPSY performed by Radha Watkins MD at [place] [address]     History reviewed. No pertinent family history. No family status information on file. SOCIAL HISTORY      reports that he has been smoking cigarettes.  He has a 20.00 General: Bowel sounds are normal.      Palpations: Abdomen is soft. Musculoskeletal: Normal range of motion. Lymphadenopathy:      Cervical: No cervical adenopathy. Skin:     General: Skin is warm and dry. Findings: No rash. Neurological:      Mental Status: He is alert and oriented to person, place, and time. RADIOLOGY:   Non-plain film images such as CT, Ultrasound and MRI are read by the radiologist. Nilsa Grave radiographic images are visualized and preliminarily interpreted by the emergency physician with the below findings:    Xr Knee Right (3 Views)    Result Date: 6/13/2020  EXAMINATION: THREE XRAY VIEWS OF THE RIGHT KNEE 6/13/2020 6:31 pm COMPARISON: None. HISTORY: ORDERING SYSTEM PROVIDED HISTORY: Pain TECHNOLOGIST PROVIDED HISTORY: Pain Reason for Exam: pain Acuity: Acute Type of Exam: Initial Relevant Medical/Surgical History: pt fell on rt knee x 3 days ago, pain in rt knee FINDINGS: Prepatellar soft tissue swelling. Cortical margins intact. Alignment anatomic. No effusion. Prepatellar bursitis     Interpretation per the Radiologist below, if available at the time of this note:    XR KNEE RIGHT (3 VIEWS)   Final Result   Prepatellar bursitis                 LABS:  Labs Reviewed   BASIC METABOLIC PANEL - Abnormal; Notable for the following components:       Result Value    Glucose 100 (*)     All other components within normal limits   CBC WITH AUTO DIFFERENTIAL   SEDIMENTATION RATE   C-REACTIVE PROTEIN   BODY FLUID CELL COUNT WITH DIFFERENTIAL       All other labs were within normal range or not returned as of this dictation. EMERGENCY DEPARTMENT COURSE and DIFFERENTIAL DIAGNOSIS/MDM:   Vitals:    Vitals:    06/13/20 1801 06/13/20 1811   BP: 125/66    Pulse: 80    Resp: 16    Temp: 97.9 °F (36.6 °C)    TempSrc: Oral    SpO2: 98%    Weight:  175 lb (79.4 kg)   Height:  6' (1.829 m)       Medical Decision Making: the knee was aspirated per Dr Karla Menard.  His sed rate and pro markers are unremarkable. His white count is normal.  He will be discharged home. He was placed in an Ace wrap. Will be given he medication and orthopedic follow-up care. Return for worsening pain, redness, fever, swelling or any other concerns  FINAL IMPRESSION      1. Prepatellar bursitis of right knee          DISPOSITION/PLAN   DISPOSITION Decision To Discharge 06/13/2020 10:41:38 PM      PATIENT REFERRED TO:   Aurora Jones, [address] [place] [address]  [phone]            DISCHARGE MEDICATIONS:     Discharge Medication List as of 6/13/2020  7:17 PM      START taking these medications    Details   ibuprofen (IBU) 800 MG tablet Take 1 tablet by mouth every 6 hours as needed for Pain, Disp-21 tablet, R-0Print      HYDROcodone-acetaminophen (NORCO) 5-325 MG per tablet Take 1 tablet by mouth every 6 hours as needed for Pain for up to 2 days. , Disp-8 tablet, R-0Print                 (Please note that portions of this note were completed with a voice recognition program.  Efforts were made to edit the dictations but occasionally words are mis-transcribed.)    [id number] LETTY, PRICILA - CNP  Certified Nurse Practitioner          PRICILA Lawson CNP  06/14/20 3363

## 2020-06-14 LAB — C-REACTIVE PROTEIN: 25.5 MG/L (ref 0–5)

## 2020-06-15 LAB
APPEARANCE FLUID: NORMAL
BASO FLUID: NORMAL %
COLOR FLUID: NORMAL
EOSINOPHIL FLUID: NORMAL %
FLUID DIFF COMMENT: NORMAL
LYMPHOCYTES, BODY FLUID: 60 %
MONOCYTE, FLUID: NORMAL %
NEUTROPHIL, FLUID: 90 %
OTHER CELLS FLUID: NORMAL %
RBC FLUID: NORMAL /MM3
SPECIMEN TYPE: NORMAL
WBC FLUID: 1694 /MM3

## 2023-05-09 ENCOUNTER — HOSPITAL ENCOUNTER (EMERGENCY)
Age: 43
Discharge: HOME | End: 2023-05-09
Attending: EMERGENCY MEDICINE
Payer: COMMERCIAL

## 2023-05-09 VITALS
BODY MASS INDEX: 28.44 KG/M2 | SYSTOLIC BLOOD PRESSURE: 142 MMHG | OXYGEN SATURATION: 97 % | HEIGHT: 72 IN | WEIGHT: 210 LBS | HEART RATE: 82 BPM | TEMPERATURE: 97.3 F | RESPIRATION RATE: 16 BRPM | DIASTOLIC BLOOD PRESSURE: 91 MMHG

## 2023-05-09 DIAGNOSIS — T15.91XA FOREIGN BODY OF RIGHT EXTERNAL EYE, INITIAL ENCOUNTER: Primary | ICD-10-CM

## 2023-05-09 DIAGNOSIS — S05.01XA ABRASION OF RIGHT CORNEA, INITIAL ENCOUNTER: ICD-10-CM

## 2023-05-09 PROCEDURE — 90715 TDAP VACCINE 7 YRS/> IM: CPT | Performed by: STUDENT IN AN ORGANIZED HEALTH CARE EDUCATION/TRAINING PROGRAM

## 2023-05-09 PROCEDURE — 99284 EMERGENCY DEPT VISIT MOD MDM: CPT

## 2023-05-09 PROCEDURE — 90471 IMMUNIZATION ADMIN: CPT | Performed by: STUDENT IN AN ORGANIZED HEALTH CARE EDUCATION/TRAINING PROGRAM

## 2023-05-09 PROCEDURE — 6360000002 HC RX W HCPCS: Performed by: STUDENT IN AN ORGANIZED HEALTH CARE EDUCATION/TRAINING PROGRAM

## 2023-05-09 PROCEDURE — 6370000000 HC RX 637 (ALT 250 FOR IP): Performed by: STUDENT IN AN ORGANIZED HEALTH CARE EDUCATION/TRAINING PROGRAM

## 2023-05-09 RX ORDER — TETRACAINE HYDROCHLORIDE 5 MG/ML
1 SOLUTION OPHTHALMIC ONCE
Status: COMPLETED | OUTPATIENT
Start: 2023-05-09 | End: 2023-05-09

## 2023-05-09 RX ORDER — ERYTHROMYCIN 5 MG/G
OINTMENT OPHTHALMIC ONCE
Status: COMPLETED | OUTPATIENT
Start: 2023-05-09 | End: 2023-05-09

## 2023-05-09 RX ORDER — ERYTHROMYCIN 5 MG/G
OINTMENT OPHTHALMIC
Qty: 3.5 G | Refills: 0 | Status: SHIPPED | OUTPATIENT
Start: 2023-05-09 | End: 2023-05-19

## 2023-05-09 RX ADMIN — TETRACAINE HYDROCHLORIDE 1 DROP: 5 SOLUTION OPHTHALMIC at 19:30

## 2023-05-09 RX ADMIN — ERYTHROMYCIN: 5 OINTMENT OPHTHALMIC at 19:30

## 2023-05-09 RX ADMIN — FLUORESCEIN SODIUM 1 MG: 1 STRIP OPHTHALMIC at 19:30

## 2023-05-09 RX ADMIN — TETANUS TOXOID, REDUCED DIPHTHERIA TOXOID AND ACELLULAR PERTUSSIS VACCINE, ADSORBED 0.5 ML: 5; 2.5; 8; 8; 2.5 SUSPENSION INTRAMUSCULAR at 19:29

## 2023-05-09 ASSESSMENT — ENCOUNTER SYMPTOMS
EYE PAIN: 1
PHOTOPHOBIA: 1
EYE REDNESS: 1

## 2023-05-09 ASSESSMENT — VISUAL ACUITY
OU: 20/25
OS: 20/20
OD: 0/0

## 2023-05-09 ASSESSMENT — PAIN - FUNCTIONAL ASSESSMENT: PAIN_FUNCTIONAL_ASSESSMENT: 0-10

## 2023-05-09 ASSESSMENT — PAIN DESCRIPTION - DESCRIPTORS: DESCRIPTORS: DISCOMFORT

## 2023-05-09 ASSESSMENT — PAIN DESCRIPTION - ORIENTATION: ORIENTATION: RIGHT

## 2023-05-09 ASSESSMENT — PAIN DESCRIPTION - LOCATION: LOCATION: EYE

## 2023-05-09 NOTE — ED NOTES
Pt presents to the ED c/o right eye foreign body. Pt states he was grinding metal at work when a piece went into his eye. Pt states he was wearing safety glasses. Pt states he does have blurred vision in the right eye and is experiencing some discomfort. Pt A&O x 4, does not appear in acute distress, RR even and unlabored, resting comfortably on stretcher with eyes open and call light in reach. Vital signs obtained, medical hx and allergies reviewed with pt.  Initial assessment performed by physician, Faye Vargas will carry out initial orders/tasks and reassess pt.       Sarina Gayle LPN  41/41/06 5691

## 2023-05-09 NOTE — ED NOTES
Pt taken to eyewash station for a 5 min rinse of eyes. Pt was only able to tolerate 3 min. Dr. Bailon Has updated on status.      Sridevi Owens, CHERYLE  93/34/81 0851

## 2023-05-09 NOTE — ED PROVIDER NOTES
[address]  ED  eMERGENCY dEPARTMENT eNCOUnter   Attending Attestation     Pt Name: Trixie Celestin  MRN: [id number]  [place] 1980  Date of evaluation: 5/9/23       Trixie Celestin is a 38 y.o. male who presents with Foreign Body in Eye (C/o getting metal in his rt eye while using a grinding wheel at work about an hour ago, pt states he was using safety glasses.)      History: Patient presents with concern for foreign body in the eye. Patient was using a grinding wheel at work and states that there is some metal in his eye. Patient believes it in his right eye. Exam: Patient has small piece of wire or sliver of metal that is outside the eye on my exam.  Patient has a punctate uptake of fluorescein at the 3 o'clock position of the right eye. Patient also has a very small linear uptake over the inferior sclera on the left eye. Patient has multiple dark areas over the sclera on the right eye, this does not appear to be acute. Patient also has some deformity to the right iris over the medial aspect. Plan for discharge with antibiotic for the right eye, will have him follow-up with ophthalmology for the acute and chronic issues in the eye. I performed a history and physical examination of the patient and discussed management with the resident. I reviewed the residents note and agree with the documented findings and plan of care. Any areas of disagreement are noted on the chart. I was personally present for the key portions of any procedures. I have documented in the chart those procedures where I was not present during the key portions. I have personally reviewed all images and agree with the resident's interpretation. I have reviewed the emergency nurses triage note. I agree with the chief complaint, past medical history, past surgical history, allergies, medications, social and family history as documented unless otherwise noted below.  Documentation of the HPI, Physical Exam and Medical
equal, round, and reactive to light. Comments: There appears to be discoloration of the conjunctival of the right eye. His have 2 small blackish use. There is also abnormal discoloration of the medial iris of the right eye. No obvious foreign body was noted. Fluorescein staining and Woods lamp was used. Patient appeared to have a corneal abrasion at the 3 o'clock position of the right eye. No foreign bodies were identified. Cardiovascular:      Rate and Rhythm: Normal rate and regular rhythm. Pulses: Normal pulses. Heart sounds: Normal heart sounds. Pulmonary:      Effort: Pulmonary effort is normal.      Breath sounds: Normal breath sounds. Abdominal:      General: Abdomen is flat. Palpations: Abdomen is soft. Musculoskeletal:         General: Normal range of motion. Cervical back: Normal range of motion. Skin:     General: Skin is warm and dry. Capillary Refill: Capillary refill takes less than 2 seconds. Neurological:      General: No focal deficit present. Mental Status: He is alert and oriented to person, place, and time. Psychiatric:         Mood and Affect: Mood normal.         Behavior: Behavior normal.         DDX/DIAGNOSTIC RESULTS / EMERGENCY DEPARTMENT COURSE / MDM     Medical Decision Making  This is a 40-year-old male presenting emerged from today for evaluation of foreign body exposure to the right eye. He was cutting metal at work when a metal got past the safety glasses into his right eye. Please see HPI for full history regarding high. When the patient arrived to the ER he was sent for eye irrigation for 5 minutes. He then returned. There were metal shavings noted on the skin just below the eye. No obvious foreign body was identified on visual inspection or fluorescein staining with Woods lamp. There was an abrasion to the cornea noted at 3 o'clock position of the right eye. No evidence of corneal ulceration or globe rupture.  Patient

## 2023-09-28 ENCOUNTER — APPOINTMENT (OUTPATIENT)
Dept: GENERAL RADIOLOGY | Age: 43
End: 2023-09-28

## 2023-09-28 ENCOUNTER — APPOINTMENT (OUTPATIENT)
Dept: CT IMAGING | Age: 43
End: 2023-09-28

## 2023-09-28 ENCOUNTER — HOSPITAL ENCOUNTER (INPATIENT)
Age: 43
LOS: 1 days | Discharge: HOME | End: 2023-09-29
Attending: EMERGENCY MEDICINE | Admitting: STUDENT IN AN ORGANIZED HEALTH CARE EDUCATION/TRAINING PROGRAM

## 2023-09-28 DIAGNOSIS — I63.9 CEREBROVASCULAR ACCIDENT (CVA), UNSPECIFIED MECHANISM (HCC): Primary | ICD-10-CM

## 2023-09-28 PROBLEM — I61.9 CVA (CEREBROVASCULAR ACCIDENT DUE TO INTRACEREBRAL HEMORRHAGE) (HCC): Status: ACTIVE | Noted: 2023-09-28

## 2023-09-28 PROBLEM — F41.9 ANXIETY: Status: ACTIVE | Noted: 2023-09-28

## 2023-09-28 LAB
ALBUMIN SERPL-MCNC: 4.4 G/DL (ref 3.5–5.2)
ALP SERPL-CCNC: 90 U/L (ref 40–129)
ALT SERPL-CCNC: 112 U/L (ref 5–41)
ANION GAP SERPL CALCULATED.3IONS-SCNC: 13 MMOL/L (ref 9–17)
AST SERPL-CCNC: 67 U/L
BASOPHILS # BLD: 0.06 K/UL (ref 0–0.2)
BASOPHILS NFR BLD: 1 % (ref 0–2)
BILIRUB SERPL-MCNC: 1 MG/DL (ref 0.3–1.2)
BUN SERPL-MCNC: 14 MG/DL (ref 6–20)
BUN/CREAT SERPL: 18 (ref 9–20)
CALCIUM SERPL-MCNC: 9.7 MG/DL (ref 8.6–10.4)
CHLORIDE SERPL-SCNC: 101 MMOL/L (ref 98–107)
CHP ED QC CHECK: YES
CO2 SERPL-SCNC: 25 MMOL/L (ref 20–31)
CREAT SERPL-MCNC: 0.8 MG/DL (ref 0.7–1.2)
EOSINOPHIL # BLD: 0.12 K/UL (ref 0–0.44)
EOSINOPHILS RELATIVE PERCENT: 1 % (ref 1–4)
ERYTHROCYTE [DISTWIDTH] IN BLOOD BY AUTOMATED COUNT: 13.1 % (ref 11.8–14.4)
GFR SERPL CREATININE-BSD FRML MDRD: >60 ML/MIN/1.73M2
GLUCOSE BLD-MCNC: 146 MG/DL
GLUCOSE BLD-MCNC: 146 MG/DL (ref 75–110)
GLUCOSE BLD-MCNC: 195 MG/DL (ref 75–110)
GLUCOSE SERPL-MCNC: 130 MG/DL (ref 70–99)
HCT VFR BLD AUTO: 48.6 % (ref 40.7–50.3)
HGB BLD-MCNC: 16.5 G/DL (ref 13–17)
IMM GRANULOCYTES # BLD AUTO: 0.04 K/UL (ref 0–0.3)
IMM GRANULOCYTES NFR BLD: 0 %
INR PPP: 1
LYMPHOCYTES NFR BLD: 2.63 K/UL (ref 1.1–3.7)
LYMPHOCYTES RELATIVE PERCENT: 27 % (ref 24–43)
MAGNESIUM SERPL-MCNC: 2.1 MG/DL (ref 1.6–2.6)
MCH RBC QN AUTO: 30.4 PG (ref 25.2–33.5)
MCHC RBC AUTO-ENTMCNC: 34 G/DL (ref 28.4–34.8)
MCV RBC AUTO: 89.7 FL (ref 82.6–102.9)
MONOCYTES NFR BLD: 0.65 K/UL (ref 0.1–1.2)
MONOCYTES NFR BLD: 7 % (ref 3–12)
NEUTROPHILS NFR BLD: 64 % (ref 36–65)
NEUTS SEG NFR BLD: 6.23 K/UL (ref 1.5–8.1)
NRBC BLD-RTO: 0 PER 100 WBC
PARTIAL THROMBOPLASTIN TIME: 33.8 SEC (ref 23.9–33.8)
PLATELET # BLD AUTO: 262 K/UL (ref 138–453)
PMV BLD AUTO: 11 FL (ref 8.1–13.5)
POTASSIUM SERPL-SCNC: 3.5 MMOL/L (ref 3.7–5.3)
PROT SERPL-MCNC: 7.7 G/DL (ref 6.4–8.3)
PROTHROMBIN TIME: 13.4 SEC (ref 11.5–14.2)
RBC # BLD AUTO: 5.42 M/UL (ref 4.21–5.77)
SODIUM SERPL-SCNC: 139 MMOL/L (ref 135–144)
TROPONIN I SERPL HS-MCNC: 12 NG/L (ref 0–22)
WBC OTHER # BLD: 9.7 K/UL (ref 3.5–11.3)

## 2023-09-28 PROCEDURE — 99223 1ST HOSP IP/OBS HIGH 75: CPT | Performed by: STUDENT IN AN ORGANIZED HEALTH CARE EDUCATION/TRAINING PROGRAM

## 2023-09-28 PROCEDURE — 99285 EMERGENCY DEPT VISIT HI MDM: CPT

## 2023-09-28 PROCEDURE — 70498 CT ANGIOGRAPHY NECK: CPT

## 2023-09-28 PROCEDURE — 80053 COMPREHEN METABOLIC PANEL: CPT

## 2023-09-28 PROCEDURE — 71045 X-RAY EXAM CHEST 1 VIEW: CPT

## 2023-09-28 PROCEDURE — 99443 PR PHYS/QHP TELEPHONE EVALUATION 21-30 MIN: CPT | Performed by: PSYCHIATRY & NEUROLOGY

## 2023-09-28 PROCEDURE — 1200000000 HC SEMI PRIVATE

## 2023-09-28 PROCEDURE — 6370000000 HC RX 637 (ALT 250 FOR IP): Performed by: STUDENT IN AN ORGANIZED HEALTH CARE EDUCATION/TRAINING PROGRAM

## 2023-09-28 PROCEDURE — 6370000000 HC RX 637 (ALT 250 FOR IP): Performed by: EMERGENCY MEDICINE

## 2023-09-28 PROCEDURE — 85610 PROTHROMBIN TIME: CPT

## 2023-09-28 PROCEDURE — 85025 COMPLETE CBC W/AUTO DIFF WBC: CPT

## 2023-09-28 PROCEDURE — 2580000003 HC RX 258: Performed by: EMERGENCY MEDICINE

## 2023-09-28 PROCEDURE — 82947 ASSAY GLUCOSE BLOOD QUANT: CPT

## 2023-09-28 PROCEDURE — 84484 ASSAY OF TROPONIN QUANT: CPT

## 2023-09-28 PROCEDURE — 2580000003 HC RX 258: Performed by: STUDENT IN AN ORGANIZED HEALTH CARE EDUCATION/TRAINING PROGRAM

## 2023-09-28 PROCEDURE — 93005 ELECTROCARDIOGRAM TRACING: CPT | Performed by: EMERGENCY MEDICINE

## 2023-09-28 PROCEDURE — 6360000004 HC RX CONTRAST MEDICATION: Performed by: EMERGENCY MEDICINE

## 2023-09-28 PROCEDURE — 83735 ASSAY OF MAGNESIUM: CPT

## 2023-09-28 PROCEDURE — 70450 CT HEAD/BRAIN W/O DYE: CPT

## 2023-09-28 PROCEDURE — 85730 THROMBOPLASTIN TIME PARTIAL: CPT

## 2023-09-28 RX ORDER — SODIUM CHLORIDE 9 MG/ML
INJECTION, SOLUTION INTRAVENOUS PRN
Status: DISCONTINUED | OUTPATIENT
Start: 2023-09-28 | End: 2023-09-29 | Stop reason: HOSPADM

## 2023-09-28 RX ORDER — SODIUM CHLORIDE 0.9 % (FLUSH) 0.9 %
5-40 SYRINGE (ML) INJECTION EVERY 12 HOURS SCHEDULED
Status: DISCONTINUED | OUTPATIENT
Start: 2023-09-28 | End: 2023-09-29 | Stop reason: HOSPADM

## 2023-09-28 RX ORDER — ONDANSETRON 2 MG/ML
4 INJECTION INTRAMUSCULAR; INTRAVENOUS EVERY 6 HOURS PRN
Status: DISCONTINUED | OUTPATIENT
Start: 2023-09-28 | End: 2023-09-29 | Stop reason: HOSPADM

## 2023-09-28 RX ORDER — CLOPIDOGREL BISULFATE 75 MG/1
300 TABLET ORAL ONCE
Status: COMPLETED | OUTPATIENT
Start: 2023-09-28 | End: 2023-09-28

## 2023-09-28 RX ORDER — ONDANSETRON 4 MG/1
4 TABLET, ORALLY DISINTEGRATING ORAL EVERY 8 HOURS PRN
Status: DISCONTINUED | OUTPATIENT
Start: 2023-09-28 | End: 2023-09-29 | Stop reason: HOSPADM

## 2023-09-28 RX ORDER — ASPIRIN 81 MG/1
81 TABLET ORAL DAILY
Status: DISCONTINUED | OUTPATIENT
Start: 2023-09-28 | End: 2023-09-29 | Stop reason: HOSPADM

## 2023-09-28 RX ORDER — ASPIRIN 81 MG/1
324 TABLET, CHEWABLE ORAL ONCE
Status: COMPLETED | OUTPATIENT
Start: 2023-09-28 | End: 2023-09-28

## 2023-09-28 RX ORDER — SODIUM CHLORIDE 0.9 % (FLUSH) 0.9 %
5-40 SYRINGE (ML) INJECTION PRN
Status: DISCONTINUED | OUTPATIENT
Start: 2023-09-28 | End: 2023-09-29 | Stop reason: HOSPADM

## 2023-09-28 RX ORDER — CLOPIDOGREL BISULFATE 75 MG/1
75 TABLET ORAL DAILY
Status: DISCONTINUED | OUTPATIENT
Start: 2023-09-28 | End: 2023-09-29 | Stop reason: HOSPADM

## 2023-09-28 RX ORDER — SODIUM CHLORIDE 0.9 % (FLUSH) 0.9 %
10 SYRINGE (ML) INJECTION ONCE
Status: COMPLETED | OUTPATIENT
Start: 2023-09-28 | End: 2023-09-28

## 2023-09-28 RX ORDER — POLYETHYLENE GLYCOL 3350 17 G/17G
17 POWDER, FOR SOLUTION ORAL DAILY PRN
Status: DISCONTINUED | OUTPATIENT
Start: 2023-09-28 | End: 2023-09-29 | Stop reason: HOSPADM

## 2023-09-28 RX ORDER — ENOXAPARIN SODIUM 100 MG/ML
40 INJECTION SUBCUTANEOUS DAILY
Status: DISCONTINUED | OUTPATIENT
Start: 2023-09-28 | End: 2023-09-29 | Stop reason: HOSPADM

## 2023-09-28 RX ORDER — ATORVASTATIN CALCIUM 80 MG/1
80 TABLET, FILM COATED ORAL NIGHTLY
Status: DISCONTINUED | OUTPATIENT
Start: 2023-09-28 | End: 2023-09-29 | Stop reason: HOSPADM

## 2023-09-28 RX ORDER — 0.9 % SODIUM CHLORIDE 0.9 %
80 INTRAVENOUS SOLUTION INTRAVENOUS ONCE
Status: COMPLETED | OUTPATIENT
Start: 2023-09-28 | End: 2023-09-28

## 2023-09-28 RX ADMIN — ATORVASTATIN CALCIUM 80 MG: 80 TABLET, FILM COATED ORAL at 22:18

## 2023-09-28 RX ADMIN — SODIUM CHLORIDE, PRESERVATIVE FREE 10 ML: 5 INJECTION INTRAVENOUS at 22:18

## 2023-09-28 RX ADMIN — IOPAMIDOL 75 ML: 755 INJECTION, SOLUTION INTRAVENOUS at 18:34

## 2023-09-28 RX ADMIN — SODIUM CHLORIDE 80 ML: 0.9 INJECTION, SOLUTION INTRAVENOUS at 18:35

## 2023-09-28 RX ADMIN — CLOPIDOGREL BISULFATE 300 MG: 75 TABLET ORAL at 20:47

## 2023-09-28 RX ADMIN — SODIUM CHLORIDE, PRESERVATIVE FREE 10 ML: 5 INJECTION INTRAVENOUS at 18:35

## 2023-09-28 RX ADMIN — ASPIRIN 81 MG CHEWABLE TABLET 324 MG: 81 TABLET CHEWABLE at 20:47

## 2023-09-28 ASSESSMENT — ENCOUNTER SYMPTOMS
SORE THROAT: 0
SHORTNESS OF BREATH: 0
COUGH: 0
ABDOMINAL PAIN: 0
EYE REDNESS: 0
VOMITING: 0
EYE PAIN: 0
DIARRHEA: 0
BACK PAIN: 0

## 2023-09-28 ASSESSMENT — PAIN DESCRIPTION - LOCATION: LOCATION: HEAD

## 2023-09-28 ASSESSMENT — PAIN SCALES - GENERAL: PAINLEVEL_OUTOF10: 5

## 2023-09-28 NOTE — VIRTUAL HEALTH
Deepti Warren, was evaluated through a synchronous (real-time) audio-video encounter. The patient (and/or guardian if applicable) is aware that this is a billable service, which includes applicable co-pays. This virtual visit was conducted with patient's (and/or legal guardian's) consent. Patient identification was verified, and a caregiver was present when appropriate. The patient was located at [facility] ([address] [place] [address]): [address] [facility] ED  [address] [address]  Loc: [phone]    Consults     Total time spent on this encounter: 30 minutes    --Marychuy Jarrett, DO on 9/28/2023 at 7:37 PM    An electronic signature was used to authenticate this note. formerly [facility] Stroke and Vascular Neurology Consult for  [facility] Stroke Alert through [employer] @ 6:23 p.m.  9/28/2023 7:38 PM  Pt Name: Deepti Warren  MRN: [id number]  [address] [facility] [place]: 1980  Date of evaluation: 9/28/2023  Primary Care Physician: Padmini Block MD  Reason for Evaluation: Concern for CVA    Deepti Warren is a 37 y.o. male with history of stroke with no deficits presents with new L sided numbness and L leg weakness after a nap. LKW around 1200 on. 9/28/23. Allergies  has No Known Allergies. Medications  Prior to Admission medications    Medication Sig Start Date End Date Taking? Authorizing Provider   ibuprofen (IBU) 800 MG tablet Take 1 tablet by mouth every 6 hours as needed for Pain 6/13/20   PRICILA Nieves - CNP    Scheduled Meds:   aspirin  324 mg Oral Once    clopidogrel  300 mg Oral Once     Continuous Infusions:  PRN Meds:.  Past Medical History   has a past medical history of Anxiety, Blind right eye, Cerebellar stroke ([address]), Drug use, GERD (gastroesophageal reflux disease), Hepatitis C, Lung collapse, Pneumonia, and Psychiatric problem.   Social History  Social History     Socioeconomic History    Marital status: Single     Spouse name: Not on file    Number of children: Not NAP, Old L cerebellum  CTA imaging: No LVO    Assessment  37year old male with new L hemisoma sensory changes and LLE weakness with previous history of CVA. Recommendations:  No IV Tpa as patient presented outside the window   Recommend Inpatient Neurology Consult for further assessment and evaluation   DAPT  MRI Brain      Discussed with ED Physician    At least 30 min of Telemedicine and time in conversation directly with ED staff and physician for the patient who is in imminent and life threatening deterioration without further treatment and evaluation. This Virtual Visit was conducted with patient's (and/or legal guardian's) consent, to provide telestroke consultation and necessary medical care. Time spent examining patient, reviewing the images personally, reviewing the chart, perform high complexity decision making and speaking with the nursing staff regarding recommendations    This is a Phone Consult, I have not seen the patient face to face, the telemedicine device was not utilized.     Ida Ramirez, DO  Stroke, Neurocritical Care And/or [address] [facility] Stroke [address]  Electronically signed 9/28/2023 at 7:38 PM

## 2023-09-28 NOTE — ED PROVIDER NOTES
[facility]  [facility]     Pt Name: Anibal Mei  MRN: [id number]  [address] 1980  Date of evaluation: 9/28/2023  PCP:    Keyona Monet MD  Provider: Jai Ritter       Chief Complaint   Patient presents with    Numbness    Headache     Px claims that he woke up around 1430 and felt L sided weakness in face, leg and arm        HISTORY OF PRESENT ILLNESS    Patient is 75-year-old male who presents with numbness tingling and weakness. Patient states he woke up with the symptoms approximately 2:30 PM today. Patient states he went to sleep and took a nap around noon today and then woke up with symptoms of left facial numbness. Patient states he was also having some numbness to his left foot. His mother looked at him and thought he had a left-sided facial droop. Patient has history of CVA several years ago with no known deficits from that. Patient is currently not on anticoagulation. Per chart, appears patient has history of cerebellar stroke. Patient on exam has some slight left leg weakness/drift. Patient is alert and oriented. Triage notes and Nursing notes were reviewed by myself. Any discrepancies are addressed above. PAST MEDICAL HISTORY     Past Medical History:   Diagnosis Date    Anxiety     Blind right eye     Cerebellar stroke ([address]) 9/16/2018    Drug use     cocaine    GERD (gastroesophageal reflux disease)     Hepatitis C     Lung collapse     Pneumonia     Psychiatric problem        SURGICAL HISTORY       Past Surgical History:   Procedure Laterality Date    COLONOSCOPY      ENDOSCOPY, COLON, DIAGNOSTIC      EYE SURGERY Right     approximately 1998    LOBECTOMY Left     lower lobe    UPPER GASTROINTESTINAL ENDOSCOPY  09/24/2018    with Dr. Harrison Mccormack. Biopsies taken.     UPPER GASTROINTESTINAL ENDOSCOPY N/A 9/24/2018    [facility]       Previous Medications    IBUPROFEN (IBU) 800 MG

## 2023-09-28 NOTE — ED NOTES
Pt transported to CT scan via stretcher. Accompanied by RNs. Pt not on monitor d/t monitor being out of department.      Darrian Almazan RN  09/28/23 8017

## 2023-09-29 ENCOUNTER — APPOINTMENT (OUTPATIENT)
Dept: MRI IMAGING | Age: 43
End: 2023-09-29

## 2023-09-29 ENCOUNTER — APPOINTMENT (OUTPATIENT)
Dept: GENERAL RADIOLOGY | Age: 43
End: 2023-09-29

## 2023-09-29 VITALS
DIASTOLIC BLOOD PRESSURE: 84 MMHG | TEMPERATURE: 97.5 F | SYSTOLIC BLOOD PRESSURE: 127 MMHG | WEIGHT: 220 LBS | HEART RATE: 66 BPM | OXYGEN SATURATION: 93 % | BODY MASS INDEX: 29.8 KG/M2 | RESPIRATION RATE: 16 BRPM | HEIGHT: 72 IN

## 2023-09-29 PROBLEM — E87.6 HYPOKALEMIA: Status: ACTIVE | Noted: 2023-09-29

## 2023-09-29 PROBLEM — G43.101 MIGRAINE WITH AURA AND WITH STATUS MIGRAINOSUS, NOT INTRACTABLE: Status: ACTIVE | Noted: 2023-09-29

## 2023-09-29 PROBLEM — E87.5 HYPERKALEMIA: Status: ACTIVE | Noted: 2023-09-29

## 2023-09-29 LAB
AMPHET UR QL SCN: NEGATIVE
BACTERIA URNS QL MICRO: ABNORMAL
BARBITURATES UR QL SCN: NEGATIVE
BENZODIAZ UR QL: NEGATIVE
BILIRUB UR QL STRIP: NEGATIVE
CANNABINOIDS UR QL SCN: NEGATIVE
CHOLEST SERPL-MCNC: 132 MG/DL
CHOLESTEROL/HDL RATIO: 3.8
CLARITY UR: ABNORMAL
COCAINE UR QL SCN: POSITIVE
COLOR UR: ABNORMAL
EKG ATRIAL RATE: 82 BPM
EKG P AXIS: 18 DEGREES
EKG P-R INTERVAL: 136 MS
EKG Q-T INTERVAL: 402 MS
EKG QRS DURATION: 96 MS
EKG QTC CALCULATION (BAZETT): 469 MS
EKG R AXIS: 5 DEGREES
EKG T AXIS: 38 DEGREES
EKG VENTRICULAR RATE: 82 BPM
EPI CELLS #/AREA URNS HPF: ABNORMAL /HPF (ref 0–5)
ERYTHROCYTE [DISTWIDTH] IN BLOOD BY AUTOMATED COUNT: 13.1 % (ref 11.8–14.4)
EST. AVERAGE GLUCOSE BLD GHB EST-MCNC: 103 MG/DL
FENTANYL UR QL: NEGATIVE
GLUCOSE UR STRIP-MCNC: NEGATIVE MG/DL
HBA1C MFR BLD: 5.2 % (ref 4–6)
HCT VFR BLD AUTO: 46.8 % (ref 40.7–50.3)
HDLC SERPL-MCNC: 35 MG/DL
HGB BLD-MCNC: 15.4 G/DL (ref 13–17)
HGB UR QL STRIP.AUTO: NEGATIVE
KETONES UR STRIP-MCNC: NEGATIVE MG/DL
LDLC SERPL CALC-MCNC: 80 MG/DL (ref 0–130)
LEUKOCYTE ESTERASE UR QL STRIP: ABNORMAL
MCH RBC QN AUTO: 30.1 PG (ref 25.2–33.5)
MCHC RBC AUTO-ENTMCNC: 32.9 G/DL (ref 28.4–34.8)
MCV RBC AUTO: 91.4 FL (ref 82.6–102.9)
METHADONE UR QL: NEGATIVE
NITRITE UR QL STRIP: NEGATIVE
NRBC BLD-RTO: 0 PER 100 WBC
OPIATES UR QL SCN: NEGATIVE
OXYCODONE UR QL SCN: NEGATIVE
PCP UR QL SCN: NEGATIVE
PH UR STRIP: 6 [PH] (ref 5–8)
PLATELET # BLD AUTO: 209 K/UL (ref 138–453)
PMV BLD AUTO: 11.1 FL (ref 8.1–13.5)
PROT UR STRIP-MCNC: ABNORMAL MG/DL
RBC # BLD AUTO: 5.12 M/UL (ref 4.21–5.77)
RBC #/AREA URNS HPF: ABNORMAL /HPF (ref 0–2)
SARS-COV-2 RDRP RESP QL NAA+PROBE: NOT DETECTED
SP GR UR STRIP: 1.02 (ref 1–1.03)
SPECIMEN DESCRIPTION: NORMAL
TEST INFORMATION: ABNORMAL
TRIGL SERPL-MCNC: 84 MG/DL
UROBILINOGEN UR STRIP-ACNC: NORMAL EU/DL (ref 0–1)
WBC #/AREA URNS HPF: ABNORMAL /HPF (ref 0–5)
WBC OTHER # BLD: 11.2 K/UL (ref 3.5–11.3)

## 2023-09-29 PROCEDURE — 99232 SBSQ HOSP IP/OBS MODERATE 35: CPT | Performed by: INTERNAL MEDICINE

## 2023-09-29 PROCEDURE — 2580000003 HC RX 258: Performed by: PSYCHIATRY & NEUROLOGY

## 2023-09-29 PROCEDURE — 92523 SPEECH SOUND LANG COMPREHEN: CPT

## 2023-09-29 PROCEDURE — 70551 MRI BRAIN STEM W/O DYE: CPT

## 2023-09-29 PROCEDURE — 97162 PT EVAL MOD COMPLEX 30 MIN: CPT

## 2023-09-29 PROCEDURE — 99222 1ST HOSP IP/OBS MODERATE 55: CPT | Performed by: PSYCHIATRY & NEUROLOGY

## 2023-09-29 PROCEDURE — 87635 SARS-COV-2 COVID-19 AMP PRB: CPT

## 2023-09-29 PROCEDURE — 97116 GAIT TRAINING THERAPY: CPT

## 2023-09-29 PROCEDURE — 70547 MR ANGIOGRAPHY NECK W/O DYE: CPT

## 2023-09-29 PROCEDURE — 85027 COMPLETE CBC AUTOMATED: CPT

## 2023-09-29 PROCEDURE — 6370000000 HC RX 637 (ALT 250 FOR IP): Performed by: INTERNAL MEDICINE

## 2023-09-29 PROCEDURE — 80307 DRUG TEST PRSMV CHEM ANLYZR: CPT

## 2023-09-29 PROCEDURE — 70544 MR ANGIOGRAPHY HEAD W/O DYE: CPT

## 2023-09-29 PROCEDURE — 36415 COLL VENOUS BLD VENIPUNCTURE: CPT

## 2023-09-29 PROCEDURE — 97535 SELF CARE MNGMENT TRAINING: CPT

## 2023-09-29 PROCEDURE — 81001 URINALYSIS AUTO W/SCOPE: CPT

## 2023-09-29 PROCEDURE — 83036 HEMOGLOBIN GLYCOSYLATED A1C: CPT

## 2023-09-29 PROCEDURE — 70030 X-RAY EYE FOR FOREIGN BODY: CPT

## 2023-09-29 PROCEDURE — 2500000003 HC RX 250 WO HCPCS: Performed by: PSYCHIATRY & NEUROLOGY

## 2023-09-29 PROCEDURE — 2580000003 HC RX 258: Performed by: STUDENT IN AN ORGANIZED HEALTH CARE EDUCATION/TRAINING PROGRAM

## 2023-09-29 PROCEDURE — 6360000002 HC RX W HCPCS: Performed by: STUDENT IN AN ORGANIZED HEALTH CARE EDUCATION/TRAINING PROGRAM

## 2023-09-29 PROCEDURE — 80061 LIPID PANEL: CPT

## 2023-09-29 PROCEDURE — 6360000002 HC RX W HCPCS: Performed by: PSYCHIATRY & NEUROLOGY

## 2023-09-29 PROCEDURE — 6370000000 HC RX 637 (ALT 250 FOR IP): Performed by: STUDENT IN AN ORGANIZED HEALTH CARE EDUCATION/TRAINING PROGRAM

## 2023-09-29 PROCEDURE — 6360000002 HC RX W HCPCS: Performed by: INTERNAL MEDICINE

## 2023-09-29 PROCEDURE — 97166 OT EVAL MOD COMPLEX 45 MIN: CPT

## 2023-09-29 RX ORDER — LORAZEPAM 2 MG/ML
1 INJECTION INTRAMUSCULAR ONCE
Status: COMPLETED | OUTPATIENT
Start: 2023-09-29 | End: 2023-09-29

## 2023-09-29 RX ORDER — ACETAMINOPHEN 325 MG/1
650 TABLET ORAL EVERY 4 HOURS PRN
Status: DISCONTINUED | OUTPATIENT
Start: 2023-09-29 | End: 2023-09-29 | Stop reason: HOSPADM

## 2023-09-29 RX ORDER — MAGNESIUM SULFATE IN WATER 40 MG/ML
2000 INJECTION, SOLUTION INTRAVENOUS ONCE
Status: COMPLETED | OUTPATIENT
Start: 2023-09-29 | End: 2023-09-29

## 2023-09-29 RX ORDER — ATORVASTATIN CALCIUM 20 MG/1
20 TABLET, FILM COATED ORAL DAILY
Qty: 30 TABLET | Refills: 3 | Status: SHIPPED | OUTPATIENT
Start: 2023-09-29

## 2023-09-29 RX ORDER — SUMATRIPTAN 50 MG/1
50 TABLET, FILM COATED ORAL
Qty: 1 TABLET | Refills: 0 | Status: SHIPPED | OUTPATIENT
Start: 2023-09-29 | End: 2023-09-29

## 2023-09-29 RX ORDER — ASPIRIN 81 MG/1
81 TABLET ORAL DAILY
Qty: 30 TABLET | Refills: 3 | Status: SHIPPED | OUTPATIENT
Start: 2023-09-30

## 2023-09-29 RX ORDER — DIPHENHYDRAMINE HYDROCHLORIDE 50 MG/ML
25 INJECTION INTRAMUSCULAR; INTRAVENOUS ONCE
Status: COMPLETED | OUTPATIENT
Start: 2023-09-29 | End: 2023-09-29

## 2023-09-29 RX ORDER — KETOROLAC TROMETHAMINE 30 MG/ML
30 INJECTION, SOLUTION INTRAMUSCULAR; INTRAVENOUS ONCE
Status: COMPLETED | OUTPATIENT
Start: 2023-09-29 | End: 2023-09-29

## 2023-09-29 RX ORDER — POTASSIUM CHLORIDE 20 MEQ/1
40 TABLET, EXTENDED RELEASE ORAL ONCE
Status: COMPLETED | OUTPATIENT
Start: 2023-09-29 | End: 2023-09-29

## 2023-09-29 RX ORDER — SODIUM CHLORIDE 9 MG/ML
INJECTION, SOLUTION INTRAVENOUS CONTINUOUS
Status: DISCONTINUED | OUTPATIENT
Start: 2023-09-29 | End: 2023-09-29 | Stop reason: HOSPADM

## 2023-09-29 RX ADMIN — LORAZEPAM 1 MG: 2 INJECTION INTRAMUSCULAR; INTRAVENOUS at 09:15

## 2023-09-29 RX ADMIN — SODIUM CHLORIDE, PRESERVATIVE FREE 10 ML: 5 INJECTION INTRAVENOUS at 09:15

## 2023-09-29 RX ADMIN — KETOROLAC TROMETHAMINE 30 MG: 30 INJECTION, SOLUTION INTRAMUSCULAR at 16:38

## 2023-09-29 RX ADMIN — SODIUM CHLORIDE: 9 INJECTION, SOLUTION INTRAVENOUS at 16:41

## 2023-09-29 RX ADMIN — VALPROATE SODIUM 500 MG: 100 INJECTION, SOLUTION INTRAVENOUS at 16:42

## 2023-09-29 RX ADMIN — SODIUM CHLORIDE, PRESERVATIVE FREE 10 ML: 5 INJECTION INTRAVENOUS at 08:28

## 2023-09-29 RX ADMIN — ASPIRIN 81 MG: 81 TABLET, COATED ORAL at 08:26

## 2023-09-29 RX ADMIN — POTASSIUM CHLORIDE 40 MEQ: 1500 TABLET, EXTENDED RELEASE ORAL at 13:18

## 2023-09-29 RX ADMIN — WATER 125 MG: 1 INJECTION INTRAMUSCULAR; INTRAVENOUS; SUBCUTANEOUS at 16:48

## 2023-09-29 RX ADMIN — ACETAMINOPHEN 650 MG: 325 TABLET ORAL at 08:26

## 2023-09-29 RX ADMIN — DIPHENHYDRAMINE HYDROCHLORIDE 25 MG: 50 INJECTION INTRAMUSCULAR; INTRAVENOUS at 16:37

## 2023-09-29 RX ADMIN — CLOPIDOGREL BISULFATE 75 MG: 75 TABLET ORAL at 08:26

## 2023-09-29 RX ADMIN — MAGNESIUM SULFATE HEPTAHYDRATE 2000 MG: 40 INJECTION, SOLUTION INTRAVENOUS at 16:52

## 2023-09-29 ASSESSMENT — PAIN SCALES - GENERAL
PAINLEVEL_OUTOF10: 3
PAINLEVEL_OUTOF10: 10
PAINLEVEL_OUTOF10: 3

## 2023-09-29 ASSESSMENT — PAIN DESCRIPTION - DESCRIPTORS: DESCRIPTORS: POUNDING

## 2023-09-29 ASSESSMENT — PAIN DESCRIPTION - ORIENTATION
ORIENTATION: LEFT
ORIENTATION: LEFT

## 2023-09-29 ASSESSMENT — PAIN - FUNCTIONAL ASSESSMENT: PAIN_FUNCTIONAL_ASSESSMENT: ACTIVITIES ARE NOT PREVENTED

## 2023-09-29 ASSESSMENT — PAIN DESCRIPTION - LOCATION
LOCATION: HEAD
LOCATION: HEAD

## 2023-09-29 NOTE — ED NOTES
Px appears to be resting comfortably. Px expresses no questions or concerns.       Olivia Allen RN  09/28/23 2045

## 2023-09-29 NOTE — DISCHARGE SUMMARY
[facility]  Office: [phone]  Valeria Flanagan DO, Molly Chaudhary DO, Michelle Sosa DO, Kuldeep Rondon DO, Brad Rivero MD, Ismael Guevara MD, Ann Caro MD, Martinez Pierre MD,  Myranda Moctezuma MD, Padmaja Pool MD, John Paul Ramirez DO, Oniel Nichole MD,  Candance Barrios, DO, Sonal Cutler MD, Alize Hernandez MD, Bernie Art DO, Kevan Jo MD,  Betzaida Smallwood DO, Juanita Mcelroy MD, Clair Minor MD, Milagro Marin MD, Ismael Young MD,  Jacquelin Hugo MD, Patrick Black MD, Abhi Turner MD, Matt Lopez MD, Em Vanegas DO, Bossman Rosado MD,  Raji Stanford MD, Angeli Hills MD, Tammy Thao, CNP,  Tricia Santoro, CNP,, Denice Cushing, CNP,  Zhanna Ash, DNP, Meche No, CNP, eTo Tripathi, CNP, Elle Moss, CNP, Oksana Sanchez, CNP, Verito Cosme, CNP, Rosetta Villegas, CNP, Fernando Roman, CNS, Lily Kelly, CNP, Gertrude Myles, [facility]    Discharge Summary     Patient ID: Indira Gandhi  :  1980   MRN: [id number]     ACCOUNT:  [de-identified]   Patient's PCP: Maxx Kohli MD  Admit Date: 2023   Discharge Date: 2023     Length of Stay: 1  Code Status:  Full Code  Admitting Physician: Reanna Monroy MD  Discharge Physician: Lenin Zamora DO     Active Discharge Diagnoses:     Hospital Problem Lists:  Principal Problem:    Complicated migraine  Active Problems:    Cerebrovascular accident (CVA) [facility])    History of CVA (cerebrovascular accident)    CVA (cerebrovascular accident due to intracerebral hemorrhage) ([facility])    Anxiety    Hypokalemia  Resolved Problems:    * No resolved hospital problems. *      Admission Condition:  fair     Discharged Condition: stable    Hospital Stay:     Hospital Course:   Indira Gandhi is a 37 y.o. male who was admitted for the management of  Complicated migraine , presented to ER with Numbness and Headache (Px claims that he woke up

## 2023-09-29 NOTE — ED NOTES
Pt arrives from home with c/o L sided facial droop & facial numbness, squeezing sensation to L upper arm, and L leg weakness. Very slight L sided facial droop observed. Difficulty holding up L leg during assessment, but was able to elevate leg independently. Speech clear. Hx of CVA in 2018.      Kiera Hogue RN  09/28/23 2041

## 2023-09-29 NOTE — PROGRESS NOTES
Pt admitted to room 1007 at this time. Vital signs obtained, telemetry in place. Admission database complete. Pt does not take any home medications. NIHSS stroke scale currently 2. MRI screening form complete. Bed alarm on for pt safety. Call light within reach.

## 2023-09-29 NOTE — PROGRESS NOTES
[facility]  Office: [address], DO, Lisa Briggs, DO, Tricia Herrera, DO, Tamanna Rondon, DO, Cameron Wright MD, Anum Reynolds MD, Shari Gaines MD, Chrissie Bojorquez MD,  Tana Moreno MD, Renetta Llanes MD, Amie Benavides DO, Ryley Su MD,  Martin Pierre MD, Alyssa Gabriel MD, Hero Stanford DO, Cayetano Friend MD,  Pardeep Gustafson DO, Domenic Padilla MD, Keira Morales MD, Palak Robledo MD, Cindi Jones MD,  Kalli Cerda MD, Brian Barroso MD, Bhupendra Childers MD, Keena Antoine MD, Shivani Rubio DO, Alex Garvey MD,  Myke Yao MD, Jazz Kwok MD, Velma Angel, [facility],  Denisse Metcalf, CNP,, Staci Wilkins, CNP,  Cehla Barnes, [facility], Doris Thompson, CNP, eNgar Irby, CNP, Hiren Hull, CNP, Katie Bonilla, CNP, Neeraj Contreras, CNP, Finn Ingram, CNP, Betzaida Miller, CNS, Vincent Roberson, CNP, Tia [place], [address] [place] [address]    Progress Note    9/29/2023    10:38 AM    Name:   Lanie Bassett  MRN:     [id number]     [address]:      [de-identified]   Room:   [address] [place] [address] Day:  1  Admit Date:  9/28/2023  6:08 PM    PCP:   Dipesh Jennings MD  Code Status:  Full Code    Subjective:     C/C:   Chief Complaint   Patient presents with    Numbness    Headache     Px claims that he woke up around 1430 and felt L sided weakness in face, leg and arm      Interval History Status: not changed. Patient with continued left-sided facial numbness and headache. Denies any chest pain, shortness of breath, nausea or vomiting, fevers or chills. No other neurological symptoms. Brief History: This is a 68-year-old male with history of prior stroke that presents with left-sided facial numbness, headache and lower extremity numbness. Admitted for for stroke investigation.     Review of Systems:     Constitutional:  negative for chills, fevers, sweats  Respiratory:  negative for cough, dyspnea 16.5 15.4   HCT 48.6 46.8   MCV 89.7 91.4   MCH 30.4 30.1   MCHC 34.0 32.9   RDW 13.1 13.1    209   MPV 11.0 11.1   INR 1.0  --      Chemistry:  Recent Labs     09/28/23 1808 09/28/23 1811   NA  --  139   K  --  3.5*   CL  --  101   CO2  --  25   GLUCOSE 146 130*   BUN  --  14   CREATININE  --  0.8   MG  --  2.1   ANIONGAP  --  13   LABGLOM  --  >60   CALCIUM  --  9.7   TROPHS  --  12     Recent Labs     09/28/23  1808 09/28/23  1811 09/28/23  2134   PROT  --  7.7  --    LABALBU  --  4.4  --    AST  --  67*  --    ALT  --  112*  --    ALKPHOS  --  90  --    BILITOT  --  1.0  --    POCGLU 146*  --  195*     ABG:  Lab Results   Component Value Date/Time    PHART 7.450 10/30/2015 09:10 PM    TBS3ISP 42.0 10/30/2015 09:10 PM    PO2ART 114.0 10/30/2015 09:10 PM    ISE6OUC 28.8 10/30/2015 09:10 PM    NBEA NOT REPORTED 10/30/2015 09:10 PM    PBEA 4.7 10/30/2015 09:10 PM    M0ADGLAI 99.0 10/30/2015 09:10 PM    FIO2 NOT REPORTED 05/12/2019 11:48 AM     Lab Results   Component Value Date/Time    SPECIAL RIGHT HAND, 12ML 09/20/2018 01:05 PM     Lab Results   Component Value Date/Time    CULTURE NO GROWTH 6 DAYS 09/20/2018 01:05 PM       Radiology:  XR CHEST PORTABLE    Result Date: 9/28/2023  No acute cardiopulmonary findings. CTA HEAD NECK W CONTRAST    Result Date: 9/28/2023  No flow-limiting large vessel stenosis in the head and neck. CT HEAD WO CONTRAST    Addendum Date: 9/28/2023    ADDENDUM: Results communicated to Dr. Sukumar Elliott at 6:57 p.m. on 09/28/2023. Result Date: 9/28/2023  No acute intracranial abnormality. Old infarct in the left cerebellum. The findings were sent to the Radiology Results [address] at 6:52 pm on 9/28/2023 to be communicated to a licensed caregiver.        Physical Examination:        General appearance:  alert, cooperative and no distress  Mental Status:  oriented to person, place and time and normal affect  Lungs:  clear to auscultation bilaterally, normal

## 2023-09-29 NOTE — PROGRESS NOTES
[x] Medication Reconciliation was completed and the patient's home medication list was verified. The Med List Status is \"Complete\". The following sources were used to assist with Medication Reconciliation:    [] Patient had a list of medications which was transcribed into the EHR.     [] Patient provided bottles of their medications    [x] Home medications reviewed and confirmed with Lynn Hull    [] Contacted patient's pharmacy to confirm home medications    [] Contacted patient's physician office to confirm home medications    [] Medical Records from another facility and/or Care Everywhere were reviewed

## 2023-09-29 NOTE — PLAN OF CARE
Pt alert and oriented x4, currently on room air. Pt admitted for stroke workup, orders for MRI, MRA placed. NIHSS stroke scale completed q4h. Pt c/o left sided numbness and tingling and left sided weakness. Bed alarm on for pt safety, call light within reach.      Problem: Discharge Planning  Goal: Discharge to home or other facility with appropriate resources  Outcome: Progressing     Problem: Pain  Goal: Verbalizes/displays adequate comfort level or baseline comfort level  Outcome: Progressing     Problem: Safety - Adult  Goal: Free from fall injury  Outcome: Progressing     Problem: ABCDS Injury Assessment  Goal: Absence of physical injury  Outcome: Progressing

## 2023-09-29 NOTE — ED NOTES
Px appears to be resting comfortably. Px expresses no questions or concerns.       Karan Mccracken RN  09/28/23 204

## 2023-09-29 NOTE — PROGRESS NOTES
CLINICAL PHARMACY NOTE: MEDS TO BEDS    Total # of Prescriptions Filled: 3   The following medications were delivered to the patient:  Aspirin 81mg  Atorvastatin 20mg  Sumatriptan 25mg    Additional Documentation:

## 2023-09-29 NOTE — PROGRESS NOTES
[facility] Care Pharmacy Service   Medication Review    The patient's list of current home medications has been reviewed. Source(s) of information: spoke to patient     Based on information provided by the above source(s), I have updated the patient's home med list as described below. I changed or updated the following medications on the patient's home medication list:  Discontinued ibuprofen (IBU) 800 MG tablet     Added None      Adjusted   None      Other Notes None            Please feel free to call me with any questions about this encounter. Thank you. This note will be reviewed and co-signed by the [facility] Pharmacist. The pharmacist will review inpatient orders and contact the physician about any discrepancies. Bryan Grimaldo, pharmacy technician  Transitions [facility] Pharmacy Service  Phone:  [phone]  Fax: [phone]      Electronically signed by Bryan Grimaldo on 9/29/2023 at 12:31 PM       Prior to Admission medications    Medication Sig Start Date End Date Taking?  Authorizing Provider

## 2023-09-29 NOTE — ED NOTES
ED to inpatient nurses report     Chief Complaint   Patient presents with    Numbness    Headache     Px claims that he woke up around 1430 and felt L sided weakness in face, leg and arm       Present to ED from home  LOC: alert and orientated to name, place, date  Vital signs   Vitals:    09/28/23 1901 09/28/23 1911 09/28/23 1926 09/28/23 2004   BP: (!) 136/100 (!) 144/100 (!) 151/100 (!) 126/109   Pulse: 82 81 79 75   Resp: 19 23 20 18   Temp:       TempSrc:       SpO2: 93% 93% 94% 97%   Weight:       Height:          Oxygen Baseline room air    Current needs required none   SEPSIS:   [] Lactate X 2 ordered (Yes or No)  [] Antibiotics given (Yes or No)  [] IV Fluids ordered (Yes or No)             [] 2nd IV completed (Yes or No)  [] Hourly Vital Signs (Validated)  [] Outstanding Orders:     LDAs:   Peripheral IV 09/28/23 Left Antecubital (Active)     Mobility: Independent  Fall Risk:    Pending ED orders: Swallow study, Plavix, ASA  Present condition: stable  Code Status: full code  Consults: IP CONSULT TO HOSPITALIST  IP CONSULT TO NEUROLOGY  [x]  Hospitalist  Completed  [x] yes [] no Who:   []  Medicine  Completed  [] yes [] No Who:   []  Cardiology  Completed  [] yes [] No Who:   []  GI   Completed  [] yes [] No Who:   [x]  Neurology  Completed  [x] yes [] No Who:   []  Nephrology Completed  [] yes [] No Who:    []  Vascular  Completed  [] yes [] No Who:   []  Ortho  Completed  [] yes [] No Who:     []  Surgery  Completed  [] yes [] No Who:    []  Urology  Completed  [] yes [] No Who:    []  CT Surgery Completed  [] yes [] No Who:   []  Podiatry  Completed  [] yes [] No Who:    []  Other    Completed  [] yes [] No Who:  Interventions: CT head, labs  Important Events: Pt arrives from home with c/o L sided facial droop & facial numbness, squeezing sensation to L upper arm, and L leg weakness. Very slight L sided facial droop observed.  Difficulty holding up L leg during assessment, but was able to elevate leg

## 2023-09-29 NOTE — PROGRESS NOTES
Physical Therapy  Facility/Department: [facility] PROGRESSIVE CARE  Physical Therapy Initial Assessment    Name: Jaquelin Da Silva  : 1980  MRN: [id number]  Date of Service: 2023    Discharge Recommendations:  Patient would benefit from continued therapy after discharge    Pt presenting with new musculoskeletal dysfunction and would benefit from additional therapy at time of discharge. Please refer to the AM-PAC score for current functional status. PER HPI:  Jaquelin Da Silva is a 37 y.o. Non- / non  male who presents with Numbness and Headache (Px claims that he woke up around 1430 and felt L sided weakness in face, leg and arm )   and is admitted to the hospital for the management of CVA (cerebrovascular accident due to intracerebral hemorrhage) ([address]). 49-year-old male with a past medical history of cerebellar stroke, hepatitis C, GERD, and anxiety presents to the emergency room for left-sided deficits including facial numbness and lower extremity numbness. Patient does have a history of CVA stroke in 2018. He states that he is not on any medications or secondary prevention at this time. Patient states that his symptoms started around 2:30 PM today. Patient did not receive tPA in the emergency department. Neurology was consulted and recommend patient be admitted on aspirin and Plavix. MRI and MRA ordered per neurology recommendations. Patient denies nausea, vomiting, diarrhea, shortness, cough or chest pain. Patient Diagnosis(es): The encounter diagnosis was Cerebrovascular accident (CVA), unspecified mechanism ([address]). Past Medical History:  has a past medical history of Anxiety, Blind right eye, Cerebellar stroke ([address]), Drug use, GERD (gastroesophageal reflux disease), Hepatitis C, Lung collapse, Pneumonia, and Psychiatric problem. Past Surgical History:  has a past surgical history that includes Lung lobectomy (Left);  Endoscopy, colon, diagnostic; Colonoscopy; eye surgery (Right); rails  Entrance Stairs - Number of Steps: 2  Bathroom Shower/Tub: Tub/Shower unit  Bathroom Equipment: None  Home Equipment: None  Has the patient had two or more falls in the past year or any fall with injury in the past year?: No  ADL Assistance: [id number] Independent  Homemaking Responsibilities: Yes  Ambulation Assistance: Independent  Transfer Assistance: Independent  Active : Yes  Occupation: Full time employment  Type of Occupation: [employer]  Vision/Hearing  Vision  Vision: Impaired  Vision Exceptions: Legally blind (blind in R eye, L eye is blurry since CVA)  Hearing  Hearing: Within functional limits    Cognition   Orientation  Overall Orientation Status: Within Functional Limits  Cognition  Overall Cognitive Status: Exceptions  Safety Judgement: Decreased awareness of need for assistance;Decreased awareness of need for safety     Objective   Pulse: 79  Heart Rate Source: Monitor  BP: 121/80  BP Location: Right upper arm  BP Method: Automatic  Patient Position: Supine  MAP (Calculated): 94  Respirations: 18  SpO2: 93 %  O2 Device: None (Room air)     Observation/Palpation  Posture: Good  Observation: numbness in L side of face with headache/pain (10/10) around eye.  Numbness in L heel and constant pressure in LUE        AROM RLE (degrees)  RLE AROM: WFL  AROM LLE (degrees)  LLE AROM : WFL  AROM RUE (degrees)  RUE General AROM: refer to OT assessment  AROM LUE (degrees)  LUE General AROM: refer to OT assessment  Strength RLE  Strength RLE: WFL  Strength LLE  Comment: ankle 3+/5, knee 4-/5, hip 4/5  Strength RUE  Comment: refer to OT assessment  Strength LUE  Comment: refer to OT assessment          Bed mobility  Supine to Sit: Stand by assistance  Sit to Supine: Stand by assistance  Transfers  Sit to Stand: Stand by assistance  Stand to Sit: Stand by assistance  Ambulation  Device: No Device  Assistance: Contact guard assistance;Stand by assistance  Gait

## 2023-09-29 NOTE — PROGRESS NOTES
All patient belongings collected. IV and telemetry removed. All discharge paperwork given and explained to patient. Patient left with mother to private auto, ambulatory. WC offered, pt declined.

## 2023-09-29 NOTE — PROGRESS NOTES
Occupational Therapy  Facility/Department: [facility] PROGRESSIVE CARE  Occupational Therapy Initial Assessment    Name: Nate Cazares  : 1980  MRN: [id number]  Date of Service: 2023    Discharge Recommendations:  Patient would benefit from continued therapy after discharge  OT Equipment Recommendations  Equipment Needed:  (CTA)       RN reports patient is medically stable for therapy treatment this date. Chart reviewed prior to treatment and patient is agreeable for therapy. All lines intact and patient positioned comfortably at end of treatment. All patient needs addressed prior to ending therapy session. Patient Diagnosis(es): The encounter diagnosis was Cerebrovascular accident (CVA), unspecified mechanism ([address] Past Medical History:  has a past medical history of Anxiety, Blind right eye, Cerebellar stroke ([address]), Drug use, GERD (gastroesophageal reflux disease), Hepatitis C, Lung collapse, Pneumonia, and Psychiatric problem. Past Surgical History:  has a past surgical history that includes Lung lobectomy (Left); Endoscopy, colon, diagnostic; Colonoscopy; eye surgery (Right); Upper gastrointestinal endoscopy (2018); and Upper gastrointestinal endoscopy (N/A, 2018). Per H&P: Nate Cazares is a 37 y.o. Non- / non  male who presents with Numbness and Headache (Px claims that he woke up around 1430 and felt L sided weakness in face, leg and arm )   and is admitted to the hospital for the management of CVA (cerebrovascular accident due to intracerebral hemorrhage) ([address] 39-year-old male with a past medical history of cerebellar stroke, hepatitis C, GERD, and anxiety presents to the emergency room for left-sided deficits including facial numbness and lower extremity numbness. Patient does have a history of CVA stroke in 2018. He states that he is not on any medications or secondary prevention at this time.   Patient states that his symptoms started around 2:30 PM

## 2023-09-29 NOTE — PROGRESS NOTES
SLP ALL NOTES  Facility/Department: [facility]  Initial Speech/Language/Cognitive Assessment    NAME: Raphael Manning  : 1980   MRN: [id number]  ADMISSION DATE: 2023  ADMITTING DIAGNOSIS: has Pneumothorax; Cerebrovascular accident (CVA) ([address]); Cerebellar infarct ([address]); Bradycardia; Nausea and vomiting; Abdominal pain; Dizziness; TIA (transient ischemic attack); Complicated migraine; History of CVA (cerebrovascular accident); Paresthesia; CVA (cerebrovascular accident due to intracerebral hemorrhage) ([address]); and Anxiety on their problem list.    Date of Eval: 2023   Evaluating Therapist: Barton Leyden, SLP    RECENT RESULTS  CT OF HEAD/MRI: pending    Primary Complaint: Raphael Manning is a 37 y.o. Non- / non  male who presents with Numbness and Headache (Px claims that he woke up around 1430 and felt L sided weakness in face, leg and arm )   and is admitted to the hospital for the management of CVA (cerebrovascular accident due to intracerebral hemorrhage) ([address] 25-year-old male with a past medical history of cerebellar stroke, hepatitis C, GERD, and anxiety presents to the emergency room for left-sided deficits including facial numbness and lower extremity numbness. Patient does have a history of CVA stroke in 2018. He states that he is not on any medications or secondary prevention at this time. Patient states that his symptoms started around 2:30 PM today. Patient did not receive tPA in the emergency department. Neurology was consulted and recommend patient be admitted on aspirin and Plavix. MRI and MRA ordered per neurology recommendations. Patient denies nausea, vomiting, diarrhea, shortness, cough or chest pain.     Pain:  Pain Assessment  Pain Assessment: 0-10  Pain Level: 3  Patient's Stated Pain Goal: 0 - No pain  Pain Location: Head  RN aware    Vision/ Hearing  Vision  Vision: Within Functional Limits  Hearing  Hearing: Within functional limits    Assessment:  Pt presents with mild cognitive deficits characterized by difficulties with immediate memory, generative naming, and thought flexibility. Pt. Presents with minimal dysarthria, mild O/M deficits at this time (decreased lingual rate). Speech is 100% intelligible. ST to follow up and provide treatment to address noted deficits. Pt does have hx of stroke, reports all speech/cognitive/linguistic deficits resolved post CVA and pt returned to baseline. Education provided. ST recommends continued therapy at this time. Recommendations:  Recommendations  Requires SLP Intervention: Yes  Patient Education: yes  Patient Education Response: Verbalizes understanding   Frequency: 3-5x/week  D/C Recommendations: Ongoing speech therapy is recommended at next level of care; Ongoing speech therapy is recommended during this hospitalization       Plan:   Speech Therapy Prognosis  Prognosis: Good  Individuals consulted  Consulted and agree with results and recommendations: Patient;RN    Goals:  Short Term Goals  Goal 1: Pt will recall 4-5 units without distraction with 90% accuracy. Goal 2: Pt will name 5-6 items for abstract categories. Goal 3: Pt will implement compensatory strategies for speech intelligibility.    Patient/family involved in developing goals and treatment plan: yes    Subjective:   Previous level of function and limitations:      Social/Functional History  Lives With:  (mother)  Type of Home: House  Active : Yes  Occupation: Full time employment  Type of Occupation: [employer]  Vision  Vision: Within Functional Limits  Hearing  Hearing: Within functional limits           Objective:       Oral Motor   Labial: No impairment  Lingual: Decreased rate on left side    Motor Speech  Intelligibility: Impaired  Conversation Intelligibility (%): 100 %  Overall Impairment Severity: Minimal         Expression  Primary Mode of Expression: Verbal         Cognition:      Orientation  Overall Orientation Status:

## 2023-11-03 ENCOUNTER — HOSPITAL ENCOUNTER (EMERGENCY)
Age: 43
Discharge: HOME | End: 2023-11-03
Attending: EMERGENCY MEDICINE

## 2023-11-03 VITALS
OXYGEN SATURATION: 95 % | SYSTOLIC BLOOD PRESSURE: 138 MMHG | WEIGHT: 210 LBS | BODY MASS INDEX: 28.48 KG/M2 | RESPIRATION RATE: 18 BRPM | TEMPERATURE: 97.9 F | DIASTOLIC BLOOD PRESSURE: 96 MMHG | HEART RATE: 81 BPM

## 2023-11-03 DIAGNOSIS — L02.91 ABSCESS: Primary | ICD-10-CM

## 2023-11-03 PROCEDURE — 99283 EMERGENCY DEPT VISIT LOW MDM: CPT

## 2023-11-03 PROCEDURE — 2500000003 HC RX 250 WO HCPCS

## 2023-11-03 PROCEDURE — 10060 I&D ABSCESS SIMPLE/SINGLE: CPT

## 2023-11-03 PROCEDURE — 6370000000 HC RX 637 (ALT 250 FOR IP)

## 2023-11-03 RX ORDER — LIDOCAINE HYDROCHLORIDE 10 MG/ML
10 INJECTION, SOLUTION INFILTRATION; PERINEURAL ONCE
Status: COMPLETED | OUTPATIENT
Start: 2023-11-03 | End: 2023-11-03

## 2023-11-03 RX ORDER — IBUPROFEN 600 MG/1
600 TABLET ORAL ONCE
Status: COMPLETED | OUTPATIENT
Start: 2023-11-03 | End: 2023-11-03

## 2023-11-03 RX ORDER — LIDOCAINE 40 MG/G
CREAM TOPICAL ONCE
Status: COMPLETED | OUTPATIENT
Start: 2023-11-03 | End: 2023-11-03

## 2023-11-03 RX ORDER — SULFAMETHOXAZOLE AND TRIMETHOPRIM 800; 160 MG/1; MG/1
1 TABLET ORAL 2 TIMES DAILY
Qty: 14 TABLET | Refills: 0 | Status: SHIPPED | OUTPATIENT
Start: 2023-11-03 | End: 2023-11-10

## 2023-11-03 RX ORDER — OXYCODONE HYDROCHLORIDE 5 MG/1
5 TABLET ORAL ONCE
Status: COMPLETED | OUTPATIENT
Start: 2023-11-03 | End: 2023-11-03

## 2023-11-03 RX ORDER — SULFAMETHOXAZOLE AND TRIMETHOPRIM 800; 160 MG/1; MG/1
1 TABLET ORAL ONCE
Status: COMPLETED | OUTPATIENT
Start: 2023-11-03 | End: 2023-11-03

## 2023-11-03 RX ADMIN — OXYCODONE HYDROCHLORIDE 5 MG: 5 TABLET ORAL at 17:38

## 2023-11-03 RX ADMIN — LIDOCAINE: 40 CREAM TOPICAL at 16:50

## 2023-11-03 RX ADMIN — SULFAMETHOXAZOLE AND TRIMETHOPRIM 1 TABLET: 800; 160 TABLET ORAL at 17:38

## 2023-11-03 RX ADMIN — IBUPROFEN 600 MG: 600 TABLET, FILM COATED ORAL at 16:50

## 2023-11-03 RX ADMIN — LIDOCAINE HYDROCHLORIDE 10 ML: 10 INJECTION, SOLUTION INFILTRATION; PERINEURAL at 17:39

## 2023-11-03 ASSESSMENT — ENCOUNTER SYMPTOMS
ABDOMINAL PAIN: 0
VOMITING: 0
SHORTNESS OF BREATH: 0
NAUSEA: 0

## 2023-11-03 ASSESSMENT — PAIN DESCRIPTION - ORIENTATION
ORIENTATION: LEFT
ORIENTATION: LEFT

## 2023-11-03 ASSESSMENT — PAIN SCALES - GENERAL
PAINLEVEL_OUTOF10: 8
PAINLEVEL_OUTOF10: 8
PAINLEVEL_OUTOF10: 6

## 2023-11-03 ASSESSMENT — PAIN - FUNCTIONAL ASSESSMENT: PAIN_FUNCTIONAL_ASSESSMENT: 0-10

## 2023-11-03 ASSESSMENT — PAIN DESCRIPTION - LOCATION
LOCATION: LEG
LOCATION: LEG

## 2023-11-03 NOTE — DISCHARGE INSTRUCTIONS
Take your medication as indicated, if you are given an antibiotic then make sure you get the prescription filled and take the antibiotics until finished. Drink plenty of water while taking the antibiotics. Avoid drinking alcohol or drinks that have caffeine in it while taking antibiotics. For pain use ibuprofen (Motrin / Advil) or acetaminophen (Tylenol), unless prescribed medications that have acetaminophen in it. You can take over the counter acetaminophen tablets (1 - 2 tablets of the 500-mg strength every 6 hours) or ibuprofen tablets (2 tablets every 4 hours). PLEASE RETURN TO THE EMERGENCY DEPARTMENT IMMEDIATELY for worsening symptoms, white drainage from the wound, redness or streaking, or if you develop any concerning symptoms such as: high fever not relieved by acetaminophen (Tylenol) and/or ibuprofen (Motrin / Advil), chills, shortness of breath, chest pain, feeling of your heart fluttering or racing, persistent nausea and/or vomiting, numbness, weakness or tingling in the arms or legs or change in color of the extremities, changes in mental status, persistent headache, blurry vision, unable to follow up with your physician, or other any other care or concern.

## 2023-11-03 NOTE — ED NOTES
Pt presents to the ED c/o abscess to right calf x 1 week. Pt denies any CP, SOB, nausea or vomiting. Pt A&O x 4, does not appear in acute distress, RR even and unlabored. Pt sitting in a chair with eyes open. Vital signs obtained, medical hx and allergies reviewed with pt. Triage completed.        Tata Wilson LPN  80/83/52 [id number]

## 2023-11-03 NOTE — ED PROVIDER NOTES
[address] [place] [address] ED  Emergency Department Encounter  Emergency Medicine Resident     Pt Jaime Garrett  MRN: [id number]  [address] [place] 1980  Date of evaluation: 11/3/23  PCP:  Rosalie Bowser MD  Note Started: 4:06 PM EDT      CHIEF COMPLAINT       Chief Complaint   Patient presents with    Abscess     Right leg x3 days        HISTORY OF PRESENT ILLNESS  (Location/Symptom, Timing/Onset, Context/Setting, Quality, Duration, Modifying Factors, Severity.)      Glenroy Bunch is a 37 y.o. male who presents with abscess on right lower extremity. Patient states he started to notice it approximately 3 days ago. Attempted warm compress but did not have any resolution in symptoms. Patient states he has had associated chills, denies any associated fever, nausea, vomiting, drainage of the abscess. Patient states he has not use any medications for his symptoms. PAST MEDICAL / SURGICAL / SOCIAL / FAMILY HISTORY      has a past medical history of Anxiety, Blind right eye, Cerebellar stroke ([address]), Drug use, GERD (gastroesophageal reflux disease), Hepatitis C, Lung collapse, Pneumonia, and Psychiatric problem. has a past surgical history that includes Lung lobectomy (Left); Endoscopy, colon, diagnostic; Colonoscopy; eye surgery (Right); Upper gastrointestinal endoscopy (09/24/2018); and Upper gastrointestinal endoscopy (N/A, 9/24/2018).       Social History     Socioeconomic History    Marital status: Single     Spouse name: Not on file    Number of children: Not on file    Years of education: Not on file    Highest education level: Not on file   Occupational History    Not on file   Tobacco Use    Smoking status: Every Day     Packs/day: 1.00     Years: 20.00     Additional pack years: 0.00     Total pack years: 20.00     Types: Cigarettes    Smokeless tobacco: Former     Types: Chew   Vaping Use    Vaping Use: Never used   Substance and Sexual Activity    Alcohol use: Yes     Comment: socially    Drug

## 2023-12-09 ENCOUNTER — APPOINTMENT (OUTPATIENT)
Dept: GENERAL RADIOLOGY | Age: 43
End: 2023-12-09

## 2023-12-09 ENCOUNTER — HOSPITAL ENCOUNTER (EMERGENCY)
Age: 43
Discharge: HOME | End: 2023-12-09
Attending: EMERGENCY MEDICINE

## 2023-12-09 VITALS
DIASTOLIC BLOOD PRESSURE: 88 MMHG | WEIGHT: 204 LBS | OXYGEN SATURATION: 97 % | BODY MASS INDEX: 27.67 KG/M2 | TEMPERATURE: 97.8 F | HEART RATE: 71 BPM | SYSTOLIC BLOOD PRESSURE: 125 MMHG | RESPIRATION RATE: 16 BRPM

## 2023-12-09 DIAGNOSIS — L03.116 CELLULITIS OF LEFT LOWER EXTREMITY: ICD-10-CM

## 2023-12-09 DIAGNOSIS — T14.8XXA BITE BY ANIMAL: Primary | ICD-10-CM

## 2023-12-09 LAB
ANION GAP SERPL CALCULATED.3IONS-SCNC: 15 MMOL/L (ref 9–17)
BASOPHILS # BLD: 0.04 K/UL (ref 0–0.2)
BASOPHILS NFR BLD: 1 % (ref 0–2)
BUN SERPL-MCNC: 18 MG/DL (ref 6–20)
BUN/CREAT SERPL: 23 (ref 9–20)
CALCIUM SERPL-MCNC: 9.2 MG/DL (ref 8.6–10.4)
CHLORIDE SERPL-SCNC: 103 MMOL/L (ref 98–107)
CO2 SERPL-SCNC: 23 MMOL/L (ref 20–31)
CREAT SERPL-MCNC: 0.8 MG/DL (ref 0.7–1.2)
CRP SERPL HS-MCNC: <3 MG/L (ref 0–5)
EOSINOPHIL # BLD: 0.12 K/UL (ref 0–0.44)
EOSINOPHILS RELATIVE PERCENT: 2 % (ref 1–4)
ERYTHROCYTE [DISTWIDTH] IN BLOOD BY AUTOMATED COUNT: 13.2 % (ref 11.8–14.4)
ERYTHROCYTE [SEDIMENTATION RATE] IN BLOOD BY PHOTOMETRIC METHOD: 3 MM/HR (ref 0–15)
GFR SERPL CREATININE-BSD FRML MDRD: >60 ML/MIN/1.73M2
GLUCOSE SERPL-MCNC: 125 MG/DL (ref 70–99)
HCT VFR BLD AUTO: 45.9 % (ref 40.7–50.3)
HGB BLD-MCNC: 15.6 G/DL (ref 13–17)
IMM GRANULOCYTES # BLD AUTO: 0.02 K/UL (ref 0–0.3)
IMM GRANULOCYTES NFR BLD: 0 %
LYMPHOCYTES NFR BLD: 1.98 K/UL (ref 1.1–3.7)
LYMPHOCYTES RELATIVE PERCENT: 29 % (ref 24–43)
MCH RBC QN AUTO: 31.1 PG (ref 25.2–33.5)
MCHC RBC AUTO-ENTMCNC: 34 G/DL (ref 28.4–34.8)
MCV RBC AUTO: 91.4 FL (ref 82.6–102.9)
MONOCYTES NFR BLD: 0.59 K/UL (ref 0.1–1.2)
MONOCYTES NFR BLD: 9 % (ref 3–12)
NEUTROPHILS NFR BLD: 59 % (ref 36–65)
NEUTS SEG NFR BLD: 4.12 K/UL (ref 1.5–8.1)
NRBC BLD-RTO: 0 PER 100 WBC
PLATELET # BLD AUTO: 231 K/UL (ref 138–453)
PMV BLD AUTO: 11.7 FL (ref 8.1–13.5)
POTASSIUM SERPL-SCNC: 3.8 MMOL/L (ref 3.7–5.3)
RBC # BLD AUTO: 5.02 M/UL (ref 4.21–5.77)
SODIUM SERPL-SCNC: 141 MMOL/L (ref 135–144)
WBC OTHER # BLD: 6.9 K/UL (ref 3.5–11.3)

## 2023-12-09 PROCEDURE — 87070 CULTURE OTHR SPECIMN AEROBIC: CPT

## 2023-12-09 PROCEDURE — 86140 C-REACTIVE PROTEIN: CPT

## 2023-12-09 PROCEDURE — 87205 SMEAR GRAM STAIN: CPT

## 2023-12-09 PROCEDURE — 85652 RBC SED RATE AUTOMATED: CPT

## 2023-12-09 PROCEDURE — 96365 THER/PROPH/DIAG IV INF INIT: CPT

## 2023-12-09 PROCEDURE — 99284 EMERGENCY DEPT VISIT MOD MDM: CPT

## 2023-12-09 PROCEDURE — 73590 X-RAY EXAM OF LOWER LEG: CPT

## 2023-12-09 PROCEDURE — 80048 BASIC METABOLIC PNL TOTAL CA: CPT

## 2023-12-09 PROCEDURE — 87186 SC STD MICRODIL/AGAR DIL: CPT

## 2023-12-09 PROCEDURE — 87077 CULTURE AEROBIC IDENTIFY: CPT

## 2023-12-09 PROCEDURE — 85025 COMPLETE CBC W/AUTO DIFF WBC: CPT

## 2023-12-09 PROCEDURE — 2580000003 HC RX 258: Performed by: EMERGENCY MEDICINE

## 2023-12-09 PROCEDURE — 87075 CULTR BACTERIA EXCEPT BLOOD: CPT

## 2023-12-09 PROCEDURE — 2500000003 HC RX 250 WO HCPCS

## 2023-12-09 PROCEDURE — 6360000002 HC RX W HCPCS: Performed by: EMERGENCY MEDICINE

## 2023-12-09 RX ORDER — IBUPROFEN 600 MG/1
600 TABLET ORAL 3 TIMES DAILY PRN
Qty: 30 TABLET | Refills: 0 | Status: SHIPPED | OUTPATIENT
Start: 2023-12-09

## 2023-12-09 RX ORDER — LIDOCAINE HYDROCHLORIDE 10 MG/ML
10 INJECTION, SOLUTION EPIDURAL; INFILTRATION; INTRACAUDAL; PERINEURAL ONCE
Status: COMPLETED | OUTPATIENT
Start: 2023-12-09 | End: 2023-12-09

## 2023-12-09 RX ORDER — AMOXICILLIN AND CLAVULANATE POTASSIUM 875; 125 MG/1; MG/1
1 TABLET, FILM COATED ORAL 2 TIMES DAILY
Qty: 20 TABLET | Refills: 0 | Status: SHIPPED | OUTPATIENT
Start: 2023-12-09 | End: 2023-12-19

## 2023-12-09 RX ADMIN — LIDOCAINE HYDROCHLORIDE 10 ML: 10 INJECTION, SOLUTION EPIDURAL; INFILTRATION; INTRACAUDAL; PERINEURAL at 20:26

## 2023-12-09 RX ADMIN — SODIUM CHLORIDE 3000 MG: 900 INJECTION INTRAVENOUS at 18:40

## 2023-12-09 ASSESSMENT — PAIN DESCRIPTION - LOCATION: LOCATION: LEG

## 2023-12-09 ASSESSMENT — ENCOUNTER SYMPTOMS
RESPIRATORY NEGATIVE: 1
COLOR CHANGE: 1
GASTROINTESTINAL NEGATIVE: 1

## 2023-12-09 ASSESSMENT — PAIN SCALES - GENERAL: PAINLEVEL_OUTOF10: 10

## 2023-12-09 ASSESSMENT — PAIN - FUNCTIONAL ASSESSMENT: PAIN_FUNCTIONAL_ASSESSMENT: 0-10

## 2023-12-09 ASSESSMENT — PAIN DESCRIPTION - ORIENTATION: ORIENTATION: LEFT;LOWER

## 2023-12-09 NOTE — ED NOTES
Report given to [facility] for transfer of care     Radha Prabhakar RN  12/09/23 [id number]

## 2023-12-10 LAB
MICROORGANISM SPEC CULT: NORMAL
MICROORGANISM/AGENT SPEC: NORMAL
SPECIMEN DESCRIPTION: NORMAL

## 2023-12-10 ASSESSMENT — ENCOUNTER SYMPTOMS
VOMITING: 0
NAUSEA: 0
COLOR CHANGE: 1

## 2023-12-10 NOTE — ED PROVIDER NOTES
[facility] ED  Emergency Department Encounter  Emergency Medicine Physician Note     Pt Lucila Farfan  MRN: [id number]  [address] [place] 1980  Date of evaluation: 12/10/23  PCP:  Dwane Saint, MD  Note Started: 10:56 AM EST      CHIEF COMPLAINT       Chief Complaint   Patient presents with    Animal Bite     LLE, a few weeks old. Was not seen, just poured hydrogen peroxide on it. Wound has purulent drainage and is red and swollen. Pt with difficulty bearing weight/ambulating. HISTORY OF PRESENT ILLNESS  (Location/Symptom, Timing/Onset, Context/Setting, Quality, Duration, Modifying Factors, Severity.)      Anita Rodgers is a 37 y.o. male who presents with left lower extremity dog bite that occurred approximately 3 weeks ago. Patient states that he was pouring hydroperoxide on it, states it is worsening redness swelling and purulent drainage. Patient states he has had difficulty bearing weight on it as it is beginning to hurt more. Patient denies any fevers chills nausea or vomiting. Patient denies the pain or redness moving up towards the knee or down into the ankle. PAST MEDICAL / SURGICAL / SOCIAL / FAMILY HISTORY      has a past medical history of Anxiety, Blind right eye, Cerebellar stroke ([address]), Drug use, GERD (gastroesophageal reflux disease), Hepatitis C, Lung collapse, Pneumonia, and Psychiatric problem. No additional pertinent        has a past surgical history that includes Lung lobectomy (Left); Endoscopy, colon, diagnostic; Colonoscopy; eye surgery (Right); Upper gastrointestinal endoscopy (09/24/2018); and Upper gastrointestinal endoscopy (N/A, 9/24/2018).   No additional pertinent       Social History     Socioeconomic History    Marital status: Single     Spouse name: Not on file    Number of children: Not on file    Years of education: Not on file    Highest education level: Not on file   Occupational History    Not on file   Tobacco Use    Smoking status: Every Day

## 2023-12-10 NOTE — DISCHARGE INSTRUCTIONS
Please keep dressings dry and intact  Please take antibiotics as prescribed and follow up at the [facility] podiatry clinic on Monday or Wednesday this week

## 2023-12-10 NOTE — CONSULTS
Consultation Note  Podiatric Medicine and Surgery     Subjective     Chief Complaint: Left calf dog bite    HPI:  Dago Noel is a 37 y.o. male seen at [facility] for left calf dog bite. Patient states that he had a dog bite approximately 3 weeks ago and thinks imparting where a palpable there is leg. He states that he had sudden onset of pain with decided to let it heal on its own. He states that the wound did not get worse but the last week he slowly started to get painful and decided to come to the ED today. He states that he has not treated it with anything at this time. Patient denies any recent clinical since injury patient denies any other pedal complaints at this time. Patient denies any nausea, vomiting, fever, chills or shortness of breath. PCP is Alicia Benitez MD    ROS:   Review of Systems   Constitutional: Negative. HENT: Negative. Respiratory: Negative. Cardiovascular: Negative. Gastrointestinal: Negative. Endocrine: Negative. Genitourinary: Negative. Musculoskeletal: Negative. Skin:  Positive for color change and wound. Neurological: Negative. Past Medical History   has a past medical history of Anxiety, Blind right eye, Cerebellar stroke ([address]), Drug use, GERD (gastroesophageal reflux disease), Hepatitis C, Lung collapse, Pneumonia, and Psychiatric problem. Past Surgical History   has a past surgical history that includes Lung lobectomy (Left); Endoscopy, colon, diagnostic; Colonoscopy; eye surgery (Right); Upper gastrointestinal endoscopy (09/24/2018); and Upper gastrointestinal endoscopy (N/A, 9/24/2018). Medications  Prior to Admission medications    Medication Sig Start Date End Date Taking?  Authorizing Provider   aspirin 81 MG EC tablet Take 1 tablet by mouth daily 9/30/23   Rajesh Mann DO   atorvastatin (LIPITOR) 20 MG tablet Take 1 tablet by mouth daily 9/29/23   Rajesh Mann,    SUMAtriptan (IMITREX) 50 MG tablet Take

## 2023-12-13 LAB
MICROORGANISM SPEC CULT: ABNORMAL
MICROORGANISM/AGENT SPEC: ABNORMAL
SPECIMEN DESCRIPTION: ABNORMAL

## 2023-12-16 ENCOUNTER — HOSPITAL ENCOUNTER (EMERGENCY)
Age: 43
Discharge: HOME | End: 2023-12-16
Attending: EMERGENCY MEDICINE

## 2023-12-16 VITALS
RESPIRATION RATE: 20 BRPM | HEART RATE: 68 BPM | DIASTOLIC BLOOD PRESSURE: 81 MMHG | OXYGEN SATURATION: 96 % | HEIGHT: 72 IN | BODY MASS INDEX: 28.28 KG/M2 | WEIGHT: 208.78 LBS | TEMPERATURE: 98.1 F | SYSTOLIC BLOOD PRESSURE: 133 MMHG
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
## 2023-12-16 LAB
ALBUMIN SERPL-MCNC: 3.6 G/DL (ref 3.5–5.2)
ALBUMIN/GLOB SERPL: 1.2 {RATIO} (ref 1–2.5)
ALP SERPL-CCNC: 66 U/L (ref 40–129)
ALT SERPL-CCNC: 69 U/L (ref 5–41)
ANION GAP SERPL CALCULATED.3IONS-SCNC: 8 MMOL/L (ref 9–17)
AST SERPL-CCNC: 64 U/L
BASOPHILS # BLD: 0.04 K/UL (ref 0–0.2)
BASOPHILS NFR BLD: 1 % (ref 0–2)
BILIRUB SERPL-MCNC: 0.4 MG/DL (ref 0.3–1.2)
BUN SERPL-MCNC: 20 MG/DL (ref 6–20)
CALCIUM SERPL-MCNC: 8.5 MG/DL (ref 8.6–10.4)
CHLORIDE SERPL-SCNC: 106 MMOL/L (ref 98–107)
CO2 SERPL-SCNC: 26 MMOL/L (ref 20–31)
CREAT SERPL-MCNC: 0.9 MG/DL (ref 0.7–1.2)
EOSINOPHIL # BLD: 0.24 K/UL (ref 0–0.44)
EOSINOPHILS RELATIVE PERCENT: 4 % (ref 1–4)
ERYTHROCYTE [DISTWIDTH] IN BLOOD BY AUTOMATED COUNT: 13.2 % (ref 11.8–14.4)
GFR SERPL CREATININE-BSD FRML MDRD: >60 ML/MIN/1.73M2
GLUCOSE SERPL-MCNC: 123 MG/DL (ref 70–99)
HCT VFR BLD AUTO: 46.2 % (ref 40.7–50.3)
HGB BLD-MCNC: 15.9 G/DL (ref 13–17)
IMM GRANULOCYTES # BLD AUTO: <0.03 K/UL (ref 0–0.3)
IMM GRANULOCYTES NFR BLD: 0 %
LYMPHOCYTES NFR BLD: 2.03 K/UL (ref 1.1–3.7)
LYMPHOCYTES RELATIVE PERCENT: 33 % (ref 24–43)
MCH RBC QN AUTO: 31.1 PG (ref 25.2–33.5)
MCHC RBC AUTO-ENTMCNC: 34.4 G/DL (ref 28.4–34.8)
MCV RBC AUTO: 90.4 FL (ref 82.6–102.9)
MONOCYTES NFR BLD: 0.4 K/UL (ref 0.1–1.2)
MONOCYTES NFR BLD: 7 % (ref 3–12)
NEUTROPHILS NFR BLD: 55 % (ref 36–65)
NEUTS SEG NFR BLD: 3.42 K/UL (ref 1.5–8.1)
NRBC BLD-RTO: 0 PER 100 WBC
PLATELET # BLD AUTO: 229 K/UL (ref 138–453)
PMV BLD AUTO: 11.9 FL (ref 8.1–13.5)
POTASSIUM SERPL-SCNC: 4.7 MMOL/L (ref 3.7–5.3)
PROT SERPL-MCNC: 6.5 G/DL (ref 6.4–8.3)
RBC # BLD AUTO: 5.11 M/UL (ref 4.21–5.77)
SODIUM SERPL-SCNC: 140 MMOL/L (ref 135–144)
WBC OTHER # BLD: 6.1 K/UL (ref 3.5–11.3)

## 2023-12-16 PROCEDURE — 6370000000 HC RX 637 (ALT 250 FOR IP)

## 2023-12-16 PROCEDURE — 80053 COMPREHEN METABOLIC PANEL: CPT

## 2023-12-16 PROCEDURE — 85025 COMPLETE CBC W/AUTO DIFF WBC: CPT

## 2023-12-16 RX ORDER — ONDANSETRON 4 MG/1
4 TABLET, ORALLY DISINTEGRATING ORAL EVERY 8 HOURS PRN
Status: DISCONTINUED | OUTPATIENT
Start: 2023-12-16 | End: 2023-12-16 | Stop reason: HOSPADM

## 2023-12-16 RX ORDER — DOXYCYCLINE HYCLATE 100 MG
100 TABLET ORAL 2 TIMES DAILY
Qty: 14 TABLET | Refills: 0 | Status: SHIPPED | OUTPATIENT
Start: 2023-12-16 | End: 2023-12-23

## 2023-12-16 RX ORDER — IBUPROFEN 600 MG/1
600 TABLET ORAL ONCE
Status: COMPLETED | OUTPATIENT
Start: 2023-12-16 | End: 2023-12-16

## 2023-12-16 RX ADMIN — ONDANSETRON 4 MG: 4 TABLET, ORALLY DISINTEGRATING ORAL at 15:31

## 2023-12-16 RX ADMIN — IBUPROFEN 600 MG: 600 TABLET ORAL at 15:31

## 2023-12-16 ASSESSMENT — PAIN SCALES - GENERAL: PAINLEVEL_OUTOF10: 7

## 2023-12-16 ASSESSMENT — PAIN DESCRIPTION - DESCRIPTORS: DESCRIPTORS: SHARP

## 2023-12-16 ASSESSMENT — PAIN DESCRIPTION - ORIENTATION: ORIENTATION: LEFT

## 2023-12-16 ASSESSMENT — PAIN - FUNCTIONAL ASSESSMENT: PAIN_FUNCTIONAL_ASSESSMENT: 0-10

## 2023-12-16 ASSESSMENT — PAIN DESCRIPTION - LOCATION: LOCATION: LEG

## 2023-12-16 ASSESSMENT — PAIN DESCRIPTION - FREQUENCY: FREQUENCY: CONTINUOUS

## 2023-12-16 NOTE — ED NOTES
The following labs were labeled with appropriate pt sticker and tubed to lab:     [x] Blue     [x] Lavender   [] on ice  [x] Green/yellow  [x] Green/black [] on ice  [] Ahmad Criss  [] on ice  [] Yellow  [] Red  [] Pink  [] Type/ Screen  [] ABG  [] VBG    [] COVID-19 swab    [] Rapid  [] PCR  [] Flu swab  [] Peds Viral Panel     [] Urine Sample  [] Fecal Sample  [] Pelvic Cultures  [] Blood Cultures  [] X 2  [] STREP Cultures  [] Wound Cultures      Miller Childers RN  12/16/23 [id number]

## 2023-12-16 NOTE — ED TRIAGE NOTES
Pt to ed c/o dizziness, nausea and dog bite. Pt states he was bit by a dog 11/27. Pt states he was seen last week for the dog bite due to pain and infection. Pt was put on antibiotics. Pt states the pain worsened last night. Pt states he got nauseated and dizzy this morning. Pt vomited once today. Pt states he was told to come in if pain worsened and he became nauseated. Pt has dog bite to left calf, no active bleeding. Pt is alert and oriented x4. Ambulatory to room. Vitals stable. Pt in gown, call light in reach. Will continue with plan of care.

## 2023-12-16 NOTE — ED TRIAGE NOTES
Patient presented to the ED today with complaints of leg pain. Patient states that he was bit by a dog in November (friends dog). Patient was treated at [facility] for the bite. Patient was referred to the ED with complaints of vomiting and dizziness that presented today.

## 2023-12-16 NOTE — ED PROVIDER NOTES
[facility] ED  Emergency Department Encounter  Emergency Medicine Resident     Pt Max Blake  MRN: [id number]  [address] [facility] [place] 1980  Date of evaluation: 12/16/23  PCP:  Trip Colmenares MD  Note Started: 3:22 PM EST      CHIEF COMPLAINT       Chief Complaint   Patient presents with    Animal Bite    Emesis    Dizziness       HISTORY OF PRESENT ILLNESS  (Location/Symptom, Timing/Onset, Context/Setting, Quality, Duration, Modifying Factors, Severity.)      Isabel Quiñonez is a 37 y.o. male who presents with fever, chills, and nausea starting this morning. Patient does have a wound from a dog bite sustained on 11/27 on his left lower leg. Patient was seen for this exactly 1 week ago at [facility]. X-ray at that time showed no extension to the bone, patient was prescribed a course of Augmentin, wound cultures were sent. Patient states that he has been compliant with his medication, denies immunocompromise status. Patient endorses subjective fevers at home however never measured. Patient states he is able to ambulate however is having pain there. Exam patient states that the dog that bit him was a pet of his friends, was not foaming at the mouth, dog has been observed since. Patient states he was meant to follow-up at the wound/burn clinic however he has not been able to. Patient is up-to-date on his tetanus per chart review. Per chart review, patient also had wound culture sent which are positive for MRSA now. Patient was prescribed doxycycline earlier today. PAST MEDICAL / SURGICAL / SOCIAL / FAMILY HISTORY      has a past medical history of Anxiety, Blind right eye, Cerebellar stroke ([address]), Drug use, GERD (gastroesophageal reflux disease), Hepatitis C, Lung collapse, Pneumonia, and Psychiatric problem. has a past surgical history that includes Lung lobectomy (Left); Endoscopy, colon, diagnostic; Colonoscopy; eye surgery (Right);  Upper gastrointestinal endoscopy

## 2023-12-16 NOTE — DISCHARGE INSTRUCTIONS
You were seen in the emergency department for a dog bite wound to the leg. There is no evidence of worsening infection. As we discussed, your wound cultures show that there is a better antibiotic to be on. The previous ED physician that you saw has sent off a prescription for doxycycline to your pharmacy. Take this for 7 days, twice daily until the course is complete. Stop taking the Augmentin pills that you were previously prescribed, you may discard these. Your lab work was unremarkable, there is no evidence of a worsening infection. Return to the emergency department if you have any worsening redness or swelling, puslike discharge, or worsening pain. You were previously instructed to present to the wound care clinic, ensure you keep up with your appointments at the wound care clinic. Your leg was bandaged today. You may remove the bandaging tomorrow morning.

## 2025-05-24 ENCOUNTER — HOSPITAL ENCOUNTER (EMERGENCY)
Age: 45
Discharge: HOME | End: 2025-05-24
Attending: EMERGENCY MEDICINE

## 2025-05-24 VITALS
TEMPERATURE: 98.4 F | HEART RATE: 66 BPM | DIASTOLIC BLOOD PRESSURE: 86 MMHG | SYSTOLIC BLOOD PRESSURE: 142 MMHG | RESPIRATION RATE: 18 BRPM | BODY MASS INDEX: 26.41 KG/M2 | OXYGEN SATURATION: 96 % | HEIGHT: 72 IN | WEIGHT: 195 LBS

## 2025-05-24 DIAGNOSIS — K08.89 PAIN, DENTAL: Primary | ICD-10-CM

## 2025-05-24 PROCEDURE — 99283 EMERGENCY DEPT VISIT LOW MDM: CPT

## 2025-05-24 ASSESSMENT — ENCOUNTER SYMPTOMS
SHORTNESS OF BREATH: 0
DIARRHEA: 0
EYE REDNESS: 0
BACK PAIN: 0
ABDOMINAL PAIN: 0
EYE PAIN: 0
COUGH: 0
SORE THROAT: 0
VOMITING: 0

## 2025-05-24 NOTE — ED PROVIDER NOTES
[facility] EMERGENCY DEPARTMENT  EMERGENCY MEDICINE     Pt Name: Jose Wade  MRN: [id number]  Birthdate 1980  Date of evaluation: 5/24/2025  PCP:    Lavell Sharma MD  Provider: Chon Harris DO    CHIEF COMPLAINT       Chief Complaint   Patient presents with    Dental Pain     Lower left    Neck Pain     Left side       HISTORY OF PRESENT ILLNESS    Patient is 44-year-old male presents with left-sided dental pain for the past several days.  Patient states history of cracked tooth left lower.  Patient states some mild swelling to the left part of his neck.  No fevers or chills.  No vomiting.         Triage notes and Nursing notes were reviewed by myself.  Any discrepancies are addressed above.    PAST MEDICAL HISTORY     Past Medical History:   Diagnosis Date    Anxiety     Blind right eye     Cerebellar stroke (HCC) 9/16/2018    Drug use     cocaine    GERD (gastroesophageal reflux disease)     Hepatitis C     Lung collapse     Pneumonia     Psychiatric problem        SURGICAL HISTORY       Past Surgical History:   Procedure Laterality Date    COLONOSCOPY      ENDOSCOPY, COLON, DIAGNOSTIC      EYE SURGERY Right     approximately 1998    LOBECTOMY Left     lower lobe    UPPER GASTROINTESTINAL ENDOSCOPY  09/24/2018    with Dr. Kingsley. Biopsies taken.    UPPER GASTROINTESTINAL ENDOSCOPY N/A 9/24/2018    EGD BIOPSY performed by Randall Kingsley MD at [facility] OR       CURRENT MEDICATIONS       Previous Medications    ASPIRIN 81 MG EC TABLET    Take 1 tablet by mouth daily    ATORVASTATIN (LIPITOR) 20 MG TABLET    Take 1 tablet by mouth daily    IBUPROFEN (ADVIL;MOTRIN) 600 MG TABLET    Take 1 tablet by mouth 3 times daily as needed for Pain    SUMATRIPTAN (IMITREX) 50 MG TABLET    Take 1 tablet by mouth once as needed for Migraine       ALLERGIES     No Known Allergies    FAMILY HISTORY     No family history on file.     SOCIAL HISTORY       Social History     Socioeconomic History    Marital status: Single  normal.      Mouth/Throat:        Comments: Cracked dentition left lower molar.  No signs of Schuyler angina  Eyes:      Extraocular Movements: Extraocular movements intact.      Conjunctiva/sclera: Conjunctivae normal.   Cardiovascular:      Rate and Rhythm: Normal rate and regular rhythm.      Heart sounds: No murmur heard.  Pulmonary:      Effort: Pulmonary effort is normal. No respiratory distress.      Breath sounds: Normal breath sounds.   Musculoskeletal:         General: Normal range of motion.      Cervical back: Normal range of motion.   Skin:     General: Skin is warm and dry.   Neurological:      General: No focal deficit present.      Mental Status: He is alert and oriented to person, place, and time.           DIAGNOSTIC RESULTS     EKG:(none if blank)  All EKGs are interpreted by the Emergency Department Physician who either signs or Co-signs this chart in the absence of a cardiologist.      RADIOLOGY: (none if blank)   I directly visualized the following images and reviewed the radiologist interpretations.  Interpretation per the Radiologist below, if available at the time of this note:  No orders to display       LABS:  Labs Reviewed - No data to display    All other labs were within normal range or not returned as of this dictation.  Please note, any cultures that may have been sent were not resulted at the time of this patient visit.    EMERGENCY DEPARTMENT COURSE and Medical Decision Making:     Vitals:    Vitals:    05/24/25 1344   BP: (!) 142/86   Pulse: 66   Resp: 18   Temp: 98.4 °F (36.9 °C)   TempSrc: Oral   SpO2: 96%   Weight: 88.5 kg (195 lb)   Height: 1.829 m (6')       PROCEDURES: (None if blank)  Procedures       MDM      Strict return precautions and follow up instructions were discussed with the patient with which the patient agrees    ED Medications administered this visit:  Medications - No data to display      FINAL IMPRESSION      1. Pain, dental          DISPOSITION/PLAN

## (undated) DEVICE — FORCEPS BX L240CM WRK CHN 2.8MM STD CAP W/ NDL MIC MESH

## (undated) DEVICE — Device: Brand: DEFENDO VALVE AND CONNECTOR KIT

## (undated) DEVICE — MEDICINE CUP, GRADUATED, STER: Brand: MEDLINE

## (undated) DEVICE — AIRLIFE™ NASAL OXYGEN CANNULA CURVED, FLARED TIP, WITH 7 FEET (2.1 M) CRUSH RESISTANT TUBING, OVER-THE-EAR STYLE: Brand: AIRLIFE™

## (undated) DEVICE — BLOCK BITE 60FR RUBBER ADLT DENTAL

## (undated) DEVICE — BAG SPECIMEN BIOHAZARD 6IN X 9IN